# Patient Record
Sex: MALE | Race: WHITE | NOT HISPANIC OR LATINO | Employment: OTHER | URBAN - METROPOLITAN AREA
[De-identification: names, ages, dates, MRNs, and addresses within clinical notes are randomized per-mention and may not be internally consistent; named-entity substitution may affect disease eponyms.]

---

## 2023-01-26 PROBLEM — I87.2 VENOUS INSUFFICIENCY: Status: ACTIVE | Noted: 2023-01-26

## 2023-01-26 PROBLEM — R73.9 HYPERGLYCEMIA: Status: ACTIVE | Noted: 2023-01-26

## 2023-01-26 PROBLEM — I25.10 ATHEROSCLEROTIC HEART DISEASE OF NATIVE CORONARY ARTERY WITHOUT ANGINA PECTORIS: Status: ACTIVE | Noted: 2023-01-26

## 2023-01-26 PROBLEM — R00.1 BRADYCARDIA: Status: ACTIVE | Noted: 2023-01-26

## 2023-01-26 PROBLEM — M48.00 SPINAL STENOSIS: Status: ACTIVE | Noted: 2023-01-26

## 2023-01-26 PROBLEM — E11.9 DIABETES MELLITUS (MULTI): Status: ACTIVE | Noted: 2023-01-26

## 2023-01-26 PROBLEM — M54.16 LUMBAR RADICULOPATHY: Status: ACTIVE | Noted: 2023-01-26

## 2023-01-26 PROBLEM — R82.90 ABNORMAL URINALYSIS: Status: ACTIVE | Noted: 2023-01-26

## 2023-01-26 PROBLEM — R05.9 COUGH: Status: ACTIVE | Noted: 2023-01-26

## 2023-01-26 PROBLEM — H61.23 BILATERAL IMPACTED CERUMEN: Status: ACTIVE | Noted: 2023-01-26

## 2023-01-26 PROBLEM — E87.1 HYPONATREMIA: Status: ACTIVE | Noted: 2023-01-26

## 2023-01-26 PROBLEM — Z86.0100 HISTORY OF COLON POLYPS: Status: ACTIVE | Noted: 2023-01-26

## 2023-01-26 PROBLEM — E66.9 OBESITY, CLASS I, BMI 30.0-34.9 (SEE ACTUAL BMI): Status: ACTIVE | Noted: 2023-01-26

## 2023-01-26 PROBLEM — M47.816 SPONDYLOSIS WITHOUT MYELOPATHY OR RADICULOPATHY, LUMBAR REGION: Status: ACTIVE | Noted: 2023-01-26

## 2023-01-26 PROBLEM — Z79.01 CHRONIC ANTICOAGULATION: Status: ACTIVE | Noted: 2023-01-26

## 2023-01-26 PROBLEM — G56.00 CARPAL TUNNEL SYNDROME: Status: ACTIVE | Noted: 2023-01-26

## 2023-01-26 PROBLEM — H61.20 CERUMINOSIS: Status: ACTIVE | Noted: 2023-01-26

## 2023-01-26 PROBLEM — D64.9 ANEMIA: Status: ACTIVE | Noted: 2023-01-26

## 2023-01-26 PROBLEM — E66.811 OBESITY, CLASS I, BMI 30.0-34.9 (SEE ACTUAL BMI): Status: ACTIVE | Noted: 2023-01-26

## 2023-01-26 PROBLEM — Z86.79: Status: ACTIVE | Noted: 2023-01-26

## 2023-01-26 PROBLEM — K21.9 ESOPHAGEAL REFLUX: Status: ACTIVE | Noted: 2023-01-26

## 2023-01-26 PROBLEM — R06.09 DYSPNEA ON EXERTION: Status: ACTIVE | Noted: 2023-01-26

## 2023-01-26 PROBLEM — J90 PLEURAL EFFUSION: Status: ACTIVE | Noted: 2023-01-26

## 2023-01-26 PROBLEM — N40.0 PROSTATIC HYPERPLASIA: Status: ACTIVE | Noted: 2023-01-26

## 2023-01-26 PROBLEM — N47.1 PHIMOSIS: Status: ACTIVE | Noted: 2023-01-26

## 2023-01-26 PROBLEM — Z86.010 HISTORY OF COLON POLYPS: Status: ACTIVE | Noted: 2023-01-26

## 2023-01-26 PROBLEM — N48.1 BALANITIS: Status: ACTIVE | Noted: 2023-01-26

## 2023-01-26 PROBLEM — J30.9 ALLERGIC RHINITIS: Status: ACTIVE | Noted: 2023-01-26

## 2023-01-26 PROBLEM — R09.82 POST-NASAL DRIP: Status: ACTIVE | Noted: 2023-01-26

## 2023-01-26 PROBLEM — M23.90 INTERNAL DERANGEMENT OF KNEE: Status: ACTIVE | Noted: 2023-01-26

## 2023-01-26 PROBLEM — H90.5 SNHL (SENSORINEURAL HEARING LOSS): Status: ACTIVE | Noted: 2023-01-26

## 2023-01-26 PROBLEM — R97.20 INCREASING PROSTATE SPECIFIC ANTIGEN LEVEL: Status: ACTIVE | Noted: 2023-01-26

## 2023-01-26 PROBLEM — R93.89 ABNORMAL CHEST XRAY: Status: ACTIVE | Noted: 2023-01-26

## 2023-01-26 PROBLEM — H91.90 HEARING LOSS: Status: ACTIVE | Noted: 2023-01-26

## 2023-01-26 PROBLEM — M75.80 ROTATOR CUFF TENDONITIS: Status: ACTIVE | Noted: 2023-01-26

## 2023-01-26 PROBLEM — I48.91 ATRIAL FIBRILLATION (MULTI): Status: ACTIVE | Noted: 2023-01-26

## 2023-01-26 PROBLEM — N40.1 BPH WITH OBSTRUCTION/LOWER URINARY TRACT SYMPTOMS: Status: ACTIVE | Noted: 2023-01-26

## 2023-01-26 PROBLEM — H90.3 BILATERAL SENSORINEURAL HEARING LOSS: Status: ACTIVE | Noted: 2023-01-26

## 2023-01-26 PROBLEM — N13.8 BPH WITH OBSTRUCTION/LOWER URINARY TRACT SYMPTOMS: Status: ACTIVE | Noted: 2023-01-26

## 2023-01-26 PROBLEM — I10 BENIGN ESSENTIAL HYPERTENSION: Status: ACTIVE | Noted: 2023-01-26

## 2023-01-26 PROBLEM — I25.10 3-VESSEL CORONARY ARTERY DISEASE: Status: ACTIVE | Noted: 2023-01-26

## 2023-01-26 PROBLEM — E78.5 HYPERLIPIDEMIA: Status: ACTIVE | Noted: 2023-01-26

## 2023-01-26 RX ORDER — ATORVASTATIN CALCIUM 80 MG/1
1 TABLET, FILM COATED ORAL DAILY
COMMUNITY
Start: 2014-01-08 | End: 2023-11-20 | Stop reason: SDUPTHER

## 2023-01-26 RX ORDER — METFORMIN HYDROCHLORIDE 500 MG/1
1 TABLET, EXTENDED RELEASE ORAL DAILY
COMMUNITY
Start: 2018-06-25 | End: 2023-08-09

## 2023-01-26 RX ORDER — PANTOPRAZOLE SODIUM 40 MG/1
1 TABLET, DELAYED RELEASE ORAL DAILY
COMMUNITY
End: 2023-07-05

## 2023-01-26 RX ORDER — POTASSIUM CHLORIDE 20 MEQ/1
1 TABLET, EXTENDED RELEASE ORAL DAILY
COMMUNITY
Start: 2013-06-22 | End: 2023-04-28

## 2023-01-26 RX ORDER — METOPROLOL TARTRATE 25 MG/1
1 TABLET, FILM COATED ORAL 2 TIMES DAILY
COMMUNITY
Start: 2014-07-30 | End: 2023-09-29 | Stop reason: SDUPTHER

## 2023-01-26 RX ORDER — FUROSEMIDE 20 MG/1
2 TABLET ORAL DAILY
COMMUNITY
Start: 2021-08-30 | End: 2023-10-03 | Stop reason: SDUPTHER

## 2023-01-26 RX ORDER — NITROGLYCERIN 0.4 MG/1
1 TABLET SUBLINGUAL EVERY 5 MIN PRN
COMMUNITY
Start: 2014-10-29

## 2023-01-26 RX ORDER — WARFARIN SODIUM 5 MG/1
1 TABLET ORAL
COMMUNITY
Start: 2016-07-11 | End: 2023-08-22

## 2023-01-26 RX ORDER — ACETAMINOPHEN 500 MG
1 TABLET ORAL EVERY 8 HOURS PRN
COMMUNITY
Start: 2016-10-13

## 2023-01-26 RX ORDER — LORATADINE 10 MG/1
1 CAPSULE, LIQUID FILLED ORAL DAILY PRN
COMMUNITY
Start: 2016-10-13

## 2023-01-26 RX ORDER — DILTIAZEM HYDROCHLORIDE 360 MG/1
1 CAPSULE, EXTENDED RELEASE ORAL NIGHTLY
COMMUNITY
Start: 2017-05-25 | End: 2023-07-27

## 2023-01-26 RX ORDER — DOXAZOSIN 4 MG/1
1.5 TABLET ORAL DAILY
COMMUNITY
Start: 2014-10-28 | End: 2023-06-06

## 2023-01-26 RX ORDER — RAMIPRIL 5 MG/1
1 CAPSULE ORAL DAILY
COMMUNITY
Start: 2014-09-03 | End: 2023-05-31 | Stop reason: SDUPTHER

## 2023-01-26 RX ORDER — ACETAMINOPHEN 500 MG
1 TABLET ORAL DAILY
COMMUNITY
Start: 2020-02-12

## 2023-01-26 RX ORDER — CYCLOBENZAPRINE HCL 5 MG
1 TABLET ORAL NIGHTLY PRN
COMMUNITY
Start: 2020-10-28

## 2023-02-21 LAB
INR IN PPP BY COAGULATION ASSAY: 2.9 (ref 0.9–1.1)
PROTHROMBIN TIME (PT) IN PPP BY COAGULATION ASSAY: 33.7 SEC (ref 9.8–13.4)

## 2023-03-10 ENCOUNTER — OFFICE VISIT (OUTPATIENT)
Dept: PRIMARY CARE | Facility: CLINIC | Age: 88
End: 2023-03-10
Payer: MEDICARE

## 2023-03-10 VITALS
WEIGHT: 188 LBS | SYSTOLIC BLOOD PRESSURE: 110 MMHG | DIASTOLIC BLOOD PRESSURE: 66 MMHG | OXYGEN SATURATION: 91 % | RESPIRATION RATE: 18 BRPM | BODY MASS INDEX: 33.3 KG/M2 | HEART RATE: 58 BPM

## 2023-03-10 DIAGNOSIS — Z79.01 CHRONIC ANTICOAGULATION: ICD-10-CM

## 2023-03-10 DIAGNOSIS — R06.09 DYSPNEA ON EXERTION: ICD-10-CM

## 2023-03-10 DIAGNOSIS — I10 BENIGN ESSENTIAL HYPERTENSION: Primary | ICD-10-CM

## 2023-03-10 DIAGNOSIS — E11.9 TYPE 2 DIABETES MELLITUS WITHOUT COMPLICATION, WITHOUT LONG-TERM CURRENT USE OF INSULIN (MULTI): ICD-10-CM

## 2023-03-10 DIAGNOSIS — I48.11 LONGSTANDING PERSISTENT ATRIAL FIBRILLATION (MULTI): ICD-10-CM

## 2023-03-10 PROCEDURE — 3078F DIAST BP <80 MM HG: CPT | Performed by: SPECIALIST

## 2023-03-10 PROCEDURE — 1159F MED LIST DOCD IN RCRD: CPT | Performed by: SPECIALIST

## 2023-03-10 PROCEDURE — 3074F SYST BP LT 130 MM HG: CPT | Performed by: SPECIALIST

## 2023-03-10 PROCEDURE — 99214 OFFICE O/P EST MOD 30 MIN: CPT | Performed by: SPECIALIST

## 2023-03-10 RX ORDER — ASPIRIN 81 MG/1
81 TABLET ORAL DAILY
COMMUNITY

## 2023-03-10 NOTE — PROGRESS NOTES
Subjective   Patient ID: Ariel Rodríguez is a 88 y.o. male who presents for Follow-up.  HPI    Joints bothering with cold weather.  Back bothers him walking.  Discussed concerns for Cymbalta due to bleeding risk given warfarin and ASA.  Discussed Pain Management, declined for now.  Discussed topical lidoderm patches  Always short of breath now walking  No orthopnea    Review of Systems  Constitutional  No fatigue, no fevers, no chills, no unintentional weight loss,   HEENT:  No headaches, no dizziness, no vision changes, has hearing aids for hearing loss  Cardiovascular:  No chest pain, no palpitations, Positive shortness of breath with exertion (to mailbox)    Respiratory:  Occasional cough, no hemoptysis, no wheezing, No shortness of breath at rest  GI:  No reflux, no abdominal pain, no nausea, no vomiting, no changes in bowel habits, always constipation, no bright red blood per rectum, no melena  :  No urinary frequency, no dysuria, no urine incontinence  MSK:  No falls,positive back legs joint pain, no joint swelling  Neuro:  No tremors, no extremity weakness, no changes in sensation    Objective   Physical Exam  General: Well-appearing  M in no acute distress, well nourished, well hydrated  Head:  Normocephalic, atraumatic  Eyes:  Anicteric sclera, pupils equal  Oral:     Mask in place (not examined due to pandemic)  Neck:   Supple,  Cor:      Regular rate, normal S1, S2, no murmurs appreciated, no S3, no S4   Lungs:   Clear to auscultation b/l, no wheezes, no rhonchi, no crackles, no accessory respiratory muscle use  Abd:                 Soft non tender no guarding  Ext:            One plus bilateral lower extremity edema (not wearing compression hose today for exam)    Assessment/Plan   Problem List Items Addressed This Visit          Respiratory    Dyspnea on exertion     On Furosemide 20 mg twice daily,  no real improvement.  Gets dyspnea walking to mailbox.  Plans follow-up with Pulmonary soon.                Circulatory    Atrial fibrillation (CMS/[facility])     Management per Cardiology, on warfarin         Relevant Orders    CBC    Protime-INR    Benign essential hypertension - Primary     Well controlled continue current medication         Relevant Orders    Comprehensive Metabolic Panel    CBC       Endocrine/Metabolic    Diabetes mellitus (CMS/[facility])     Very well controlled, 11/9/22 A1C 6.4 on XR Metformin 500 mg daily.  Sees optho next week.         Relevant Orders    Comprehensive Metabolic Panel    Albumin , Urine Random       Other    Chronic anticoagulation     On warfarin, monthly INRs, put in new standing order               Trice June, DO

## 2023-03-10 NOTE — ASSESSMENT & PLAN NOTE
On Furosemide 20 mg twice daily,  no real improvement.  Gets dyspnea walking to mailbox.  Plans follow-up with Pulmonary soon.

## 2023-03-21 ENCOUNTER — LAB (OUTPATIENT)
Dept: LAB | Facility: LAB | Age: 88
End: 2023-03-21
Payer: MEDICARE

## 2023-03-21 DIAGNOSIS — I48.11 LONGSTANDING PERSISTENT ATRIAL FIBRILLATION (MULTI): ICD-10-CM

## 2023-03-21 DIAGNOSIS — E11.9 TYPE 2 DIABETES MELLITUS WITHOUT COMPLICATION, WITHOUT LONG-TERM CURRENT USE OF INSULIN (MULTI): ICD-10-CM

## 2023-03-21 DIAGNOSIS — I10 BENIGN ESSENTIAL HYPERTENSION: ICD-10-CM

## 2023-03-21 LAB
ALANINE AMINOTRANSFERASE (SGPT) (U/L) IN SER/PLAS: 14 U/L (ref 10–52)
ALBUMIN (G/DL) IN SER/PLAS: 3.8 G/DL (ref 3.4–5)
ALKALINE PHOSPHATASE (U/L) IN SER/PLAS: 72 U/L (ref 33–136)
ANION GAP IN SER/PLAS: 13 MMOL/L (ref 10–20)
ASPARTATE AMINOTRANSFERASE (SGOT) (U/L) IN SER/PLAS: 18 U/L (ref 9–39)
BILIRUBIN TOTAL (MG/DL) IN SER/PLAS: 0.7 MG/DL (ref 0–1.2)
CALCIUM (MG/DL) IN SER/PLAS: 8.9 MG/DL (ref 8.6–10.6)
CARBON DIOXIDE, TOTAL (MMOL/L) IN SER/PLAS: 27 MMOL/L (ref 21–32)
CHLORIDE (MMOL/L) IN SER/PLAS: 103 MMOL/L (ref 98–107)
CREATININE (MG/DL) IN SER/PLAS: 0.9 MG/DL (ref 0.5–1.3)
ERYTHROCYTE DISTRIBUTION WIDTH (RATIO) BY AUTOMATED COUNT: 15.3 % (ref 11.5–14.5)
ERYTHROCYTE MEAN CORPUSCULAR HEMOGLOBIN CONCENTRATION (G/DL) BY AUTOMATED: 30.9 G/DL (ref 32–36)
ERYTHROCYTE MEAN CORPUSCULAR VOLUME (FL) BY AUTOMATED COUNT: 94 FL (ref 80–100)
ERYTHROCYTES (10*6/UL) IN BLOOD BY AUTOMATED COUNT: 4.04 X10E12/L (ref 4.5–5.9)
GFR MALE: 82 ML/MIN/1.73M2
GLUCOSE (MG/DL) IN SER/PLAS: 214 MG/DL (ref 74–99)
HEMATOCRIT (%) IN BLOOD BY AUTOMATED COUNT: 37.9 % (ref 41–52)
HEMOGLOBIN (G/DL) IN BLOOD: 11.7 G/DL (ref 13.5–17.5)
INR IN PPP BY COAGULATION ASSAY: 2.8 (ref 0.9–1.1)
LEUKOCYTES (10*3/UL) IN BLOOD BY AUTOMATED COUNT: 6.1 X10E9/L (ref 4.4–11.3)
NRBC (PER 100 WBCS) BY AUTOMATED COUNT: 0 /100 WBC (ref 0–0)
PLATELETS (10*3/UL) IN BLOOD AUTOMATED COUNT: 156 X10E9/L (ref 150–450)
POTASSIUM (MMOL/L) IN SER/PLAS: 4.3 MMOL/L (ref 3.5–5.3)
PROTEIN TOTAL: 6.6 G/DL (ref 6.4–8.2)
PROTHROMBIN TIME (PT) IN PPP BY COAGULATION ASSAY: 32.9 SEC (ref 9.8–13.4)
SODIUM (MMOL/L) IN SER/PLAS: 139 MMOL/L (ref 136–145)
UREA NITROGEN (MG/DL) IN SER/PLAS: 24 MG/DL (ref 6–23)

## 2023-03-21 PROCEDURE — 80053 COMPREHEN METABOLIC PANEL: CPT

## 2023-03-21 PROCEDURE — 82570 ASSAY OF URINE CREATININE: CPT

## 2023-03-21 PROCEDURE — 85610 PROTHROMBIN TIME: CPT

## 2023-03-21 PROCEDURE — 85027 COMPLETE CBC AUTOMATED: CPT

## 2023-03-21 PROCEDURE — 82043 UR ALBUMIN QUANTITATIVE: CPT

## 2023-03-21 PROCEDURE — 36415 COLL VENOUS BLD VENIPUNCTURE: CPT

## 2023-03-22 LAB
ALBUMIN (MG/L) IN URINE: 19.6 MG/L
ALBUMIN/CREATININE (UG/MG) IN URINE: 47.5 UG/MG CRT (ref 0–30)
CREATININE (MG/DL) IN URINE: 41.3 MG/DL (ref 20–370)

## 2023-04-19 LAB
INR IN PPP BY COAGULATION ASSAY: 2.4 (ref 0.9–1.1)
PROTHROMBIN TIME (PT) IN PPP BY COAGULATION ASSAY: 28.5 SEC (ref 9.8–13.4)

## 2023-04-28 DIAGNOSIS — I10 BENIGN ESSENTIAL HYPERTENSION: Primary | ICD-10-CM

## 2023-04-28 RX ORDER — POTASSIUM CHLORIDE 20 MEQ/1
TABLET, EXTENDED RELEASE ORAL
Qty: 90 TABLET | Refills: 0 | Status: SHIPPED | OUTPATIENT
Start: 2023-04-28 | End: 2023-07-27

## 2023-05-17 DIAGNOSIS — Z79.01 CHRONIC ANTICOAGULATION: ICD-10-CM

## 2023-05-17 LAB
INR IN PPP BY COAGULATION ASSAY: 2.1 (ref 0.9–1.1)
PROTHROMBIN TIME (PT) IN PPP BY COAGULATION ASSAY: 24.5 SEC (ref 9.8–13.4)

## 2023-05-30 NOTE — PROGRESS NOTES
"Subjective   Patient ID: Ariel Rodríguez is a 88 y.o. male who presents for Follow-up.  HPI    89 yo male Pmhx sig for Afib (On warfarin), CAD, Rheumatic Fever, DM, Hyperlipidemia, BPH, Colon Polyp and Spinal Stenosis presents for follow-up accompanied by his wife, Amy    Needs new standing order for PT/INR for every 2 weeks but typically only needs to do once per month     Is \"so so\"    Doesn't do mychart  Got 6 to 8 phone calls reminding him of appointments (Sunday 5/28 ) started early in May  Has not needed SL nitro  Saw dentist and saw allergist    Having constipation takes colace normal bm 1 every 3 days but more often with prune juice  Discussed adding Miralax    Had Influenza vaccine  Had 2 Covid 2 boosters bivlaent     No Known Allergies       Current Outpatient Medications   Medication Instructions    acetaminophen (Tylenol) 500 mg tablet 1 tablet, oral, Every 8 hours PRN, Repeat every 4 to 6 hours as needed    aspirin 81 mg, oral, Daily    atorvastatin (Lipitor) 80 mg tablet 1 tablet, oral, Daily    cholecalciferol (Vitamin D-3) 50 mcg (2,000 unit) capsule 1 capsule, oral, Daily    cyclobenzaprine (Flexeril) 5 mg tablet 1 tablet, oral, Nightly PRN    dilTIAZem ER (Tiazac) 360 mg 24 hr capsule 1 capsule, oral, Nightly    doxazosin (Cardura) 4 mg tablet 1.5 tablets, oral, Daily    furosemide (Lasix) 20 mg tablet 2 tablets, oral, Daily    glucosamine/chondr nelson A sod (OSTEO BI-FLEX ORAL) 2 tablets, oral, Daily, Joint Sheild    loratadine (Claritin Liqui-Gel) 10 mg capsule 1 capsule, oral, Daily PRN    metFORMIN XR (Glucophage-XR) 500 mg 24 hr tablet 1 tablet, oral, Daily    metoprolol tartrate (Lopressor) 25 mg tablet 1 tablet, oral, 2 times daily    multivitamin/iron/folic acid (CENTRUM ORAL) 1 tablet, oral, Daily, Centrum Adult Oral Tablet    nitroglycerin (Nitrostat) 0.4 mg SL tablet 1 tablet, sublingual, Every 5 min PRN    pantoprazole (Protonix) 40 mg EC tablet 1 tablet, oral, Daily, Take 30 minutes " "before breakfast    polyethylene glycol (GLYCOLAX) 9 g, oral, Daily, Mix 1/2 cap (8.5g) into 4 ounces of fluid.    potassium chloride CR (Klor-Con M20) 20 mEq ER tablet TAKE 1 TABLET BY MOUTH EVERY DAY    ramipril (ALTACE) 5 mg, oral, Daily    warfarin (Coumadin) 5 mg tablet 1 tablet, oral        Review of Systems  Constitutional  Always a little fatigue, no fevers, no chills, no unintentional weight loss,   HEENT:  No headaches, no dizziness, eye exams current, hearing aids, getting new test in August   Cardiovascular:  No chest pain, no palpitations, Positive shortness of breath with exertion (one flight of stairs), unchanged   Respiratory:   Coughs a little every day in morning, no hemoptysis, no wheezing, No shortness of breath at rest  GI:  No reflux, no abdominal pain, no nausea, no vomiting, positive constipation no brbpr no melena  :  No urinary frequency, no dysuria, no urine incontinence  MSK:  No falls, positive low back pain, some times shoulder strain in chest if picks up something too heavy, no joint swelling just ankle swelling unchanged  Neuro:  No tremors, no extremity weakness, some tingling on right hand prior CTS left CTS not surgically repaired     Physical Exam  /68   Pulse 59   Resp 17   Ht 1.6 m (5' 3\")   Wt 82.1 kg (181 lb)   SpO2 96%   BMI 32.06 kg/m²   General: Well-appearing  M in no acute distress, well nourished, well hydrated  Head:  Normocephalic, atraumatic  Eyes:  Anicteric sclera, pupils equal  Oral:     Not examined due to pandemic  Neck:   Supple  Cor:      Irregularly irregular, 2/6 systolic murmur   Lungs:   Clear to auscultation b/l, no wheezes, no rhonchi, no crackles, no accessory respiratory muscle use  Ext:            One plus lower extremity edema, no palpable cords, not wearing compression hose today  Neuro:   CN2-12 grossly intact   MSK:                No tenderness to percussion over the spinous processes    Assessment/Plan   Problem List Items Addressed " This Visit          Nervous    Lumbar radiculopathy     Asked about brace, discussed PT, he'll consider  Discussed Pain Management for injection, not interested at this time            Circulatory    3-vessel coronary artery disease     Management per cardiology         Atrial fibrillation (CMS/[facility])     Needs new standing order for PT/INR         Benign essential hypertension     Bp well controlled on current medication   Refilled ramipril         Relevant Medications    ramipril (Altace) 5 mg capsule    Other Relevant Orders    Comprehensive Metabolic Panel (Completed)       Digestive    Other constipation    Relevant Medications    polyethylene glycol (Glycolax) 17 gram packet       Endocrine/Metabolic    Diabetes mellitus (CMS/[facility]) - Primary     Last HBA1C 6.4 in November 2022  Normal kidney function   XR metformin 500 mg daily  Diabetic eye exams          Relevant Orders    Comprehensive Metabolic Panel (Completed)    Hemoglobin A1C (Completed)    Albumin , Urine Random (Completed)       Other    Chronic anticoagulation     For Afib  Typically does monthly INR, needs new standing order  Adding Miralax so will check INR in 2 weeks                 Trice June,

## 2023-05-31 ENCOUNTER — OFFICE VISIT (OUTPATIENT)
Dept: PRIMARY CARE | Facility: CLINIC | Age: 88
End: 2023-05-31
Payer: MEDICARE

## 2023-05-31 VITALS
BODY MASS INDEX: 32.07 KG/M2 | HEIGHT: 63 IN | SYSTOLIC BLOOD PRESSURE: 103 MMHG | RESPIRATION RATE: 17 BRPM | HEART RATE: 59 BPM | OXYGEN SATURATION: 96 % | WEIGHT: 181 LBS | DIASTOLIC BLOOD PRESSURE: 68 MMHG

## 2023-05-31 DIAGNOSIS — M54.16 LUMBAR RADICULOPATHY: ICD-10-CM

## 2023-05-31 DIAGNOSIS — K59.09 OTHER CONSTIPATION: ICD-10-CM

## 2023-05-31 DIAGNOSIS — I48.11 LONGSTANDING PERSISTENT ATRIAL FIBRILLATION (MULTI): ICD-10-CM

## 2023-05-31 DIAGNOSIS — I48.91 ATRIAL FIBRILLATION, UNSPECIFIED TYPE (MULTI): ICD-10-CM

## 2023-05-31 DIAGNOSIS — I10 BENIGN ESSENTIAL HYPERTENSION: ICD-10-CM

## 2023-05-31 DIAGNOSIS — Z79.01 CHRONIC ANTICOAGULATION: ICD-10-CM

## 2023-05-31 DIAGNOSIS — I25.10 3-VESSEL CORONARY ARTERY DISEASE: ICD-10-CM

## 2023-05-31 DIAGNOSIS — E11.9 TYPE 2 DIABETES MELLITUS WITHOUT COMPLICATION, WITHOUT LONG-TERM CURRENT USE OF INSULIN (MULTI): Primary | ICD-10-CM

## 2023-05-31 LAB
ALANINE AMINOTRANSFERASE (SGPT) (U/L) IN SER/PLAS: 14 U/L (ref 10–52)
ALBUMIN (G/DL) IN SER/PLAS: 4 G/DL (ref 3.4–5)
ALBUMIN (MG/L) IN URINE: 8.8 MG/L
ALBUMIN/CREATININE (UG/MG) IN URINE: 23.7 UG/MG CRT (ref 0–30)
ALKALINE PHOSPHATASE (U/L) IN SER/PLAS: 88 U/L (ref 33–136)
ANION GAP IN SER/PLAS: 10 MMOL/L (ref 10–20)
ASPARTATE AMINOTRANSFERASE (SGOT) (U/L) IN SER/PLAS: 19 U/L (ref 9–39)
BILIRUBIN TOTAL (MG/DL) IN SER/PLAS: 0.8 MG/DL (ref 0–1.2)
CALCIUM (MG/DL) IN SER/PLAS: 9.2 MG/DL (ref 8.6–10.6)
CARBON DIOXIDE, TOTAL (MMOL/L) IN SER/PLAS: 28 MMOL/L (ref 21–32)
CHLORIDE (MMOL/L) IN SER/PLAS: 104 MMOL/L (ref 98–107)
CREATININE (MG/DL) IN SER/PLAS: 1 MG/DL (ref 0.5–1.3)
CREATININE (MG/DL) IN URINE: 37.1 MG/DL (ref 20–370)
ESTIMATED AVERAGE GLUCOSE FOR HBA1C: 151 MG/DL
GFR MALE: 72 ML/MIN/1.73M2
GLUCOSE (MG/DL) IN SER/PLAS: 166 MG/DL (ref 74–99)
HEMOGLOBIN A1C/HEMOGLOBIN TOTAL IN BLOOD: 6.9 %
INR IN PPP BY COAGULATION ASSAY: 2.2 (ref 0.9–1.1)
POTASSIUM (MMOL/L) IN SER/PLAS: 4.2 MMOL/L (ref 3.5–5.3)
PROTEIN TOTAL: 7.1 G/DL (ref 6.4–8.2)
PROTHROMBIN TIME (PT) IN PPP BY COAGULATION ASSAY: 25.4 SEC (ref 9.8–13.4)
SODIUM (MMOL/L) IN SER/PLAS: 138 MMOL/L (ref 136–145)
UREA NITROGEN (MG/DL) IN SER/PLAS: 23 MG/DL (ref 6–23)

## 2023-05-31 PROCEDURE — 83036 HEMOGLOBIN GLYCOSYLATED A1C: CPT

## 2023-05-31 PROCEDURE — 85610 PROTHROMBIN TIME: CPT

## 2023-05-31 PROCEDURE — 82570 ASSAY OF URINE CREATININE: CPT

## 2023-05-31 PROCEDURE — 80053 COMPREHEN METABOLIC PANEL: CPT

## 2023-05-31 PROCEDURE — 3078F DIAST BP <80 MM HG: CPT | Performed by: SPECIALIST

## 2023-05-31 PROCEDURE — 99214 OFFICE O/P EST MOD 30 MIN: CPT | Performed by: SPECIALIST

## 2023-05-31 PROCEDURE — 82043 UR ALBUMIN QUANTITATIVE: CPT

## 2023-05-31 PROCEDURE — 3074F SYST BP LT 130 MM HG: CPT | Performed by: SPECIALIST

## 2023-05-31 PROCEDURE — 1159F MED LIST DOCD IN RCRD: CPT | Performed by: SPECIALIST

## 2023-05-31 PROCEDURE — 1036F TOBACCO NON-USER: CPT | Performed by: SPECIALIST

## 2023-05-31 RX ORDER — POLYETHYLENE GLYCOL 3350 17 G/17G
8.5 POWDER, FOR SOLUTION ORAL DAILY
Qty: 30 PACKET | Refills: 1 | Status: SHIPPED | OUTPATIENT
Start: 2023-05-31 | End: 2023-06-30

## 2023-05-31 RX ORDER — RAMIPRIL 5 MG/1
5 CAPSULE ORAL DAILY
Qty: 90 CAPSULE | Refills: 3 | Status: SHIPPED | OUTPATIENT
Start: 2023-05-31 | End: 2024-05-22

## 2023-05-31 NOTE — ASSESSMENT & PLAN NOTE
Last HBA1C 6.4 in November 2022  Normal kidney function   XR metformin 500 mg daily  Diabetic eye exams

## 2023-05-31 NOTE — ASSESSMENT & PLAN NOTE
Asked about brace, discussed PT, he'll consider  Discussed Pain Management for injection, not interested at this time

## 2023-06-01 DIAGNOSIS — Z79.01 CHRONIC ANTICOAGULATION: Primary | ICD-10-CM

## 2023-06-01 DIAGNOSIS — I48.91 ATRIAL FIBRILLATION, UNSPECIFIED TYPE (MULTI): ICD-10-CM

## 2023-06-01 NOTE — ASSESSMENT & PLAN NOTE
For Afib  Typically does monthly INR, needs new standing order  Adding Miralax so will check INR in 2 weeks

## 2023-06-05 DIAGNOSIS — I10 BENIGN ESSENTIAL HYPERTENSION: ICD-10-CM

## 2023-06-06 RX ORDER — DOXAZOSIN 4 MG/1
6 TABLET ORAL DAILY
Qty: 135 TABLET | Refills: 1 | Status: SHIPPED | OUTPATIENT
Start: 2023-06-06 | End: 2023-12-05

## 2023-06-16 ENCOUNTER — LAB (OUTPATIENT)
Dept: LAB | Facility: LAB | Age: 88
End: 2023-06-16
Payer: MEDICARE

## 2023-06-16 DIAGNOSIS — Z79.01 CHRONIC ANTICOAGULATION: ICD-10-CM

## 2023-06-16 DIAGNOSIS — I48.91 ATRIAL FIBRILLATION, UNSPECIFIED TYPE (MULTI): ICD-10-CM

## 2023-06-16 LAB
INR IN PPP BY COAGULATION ASSAY: 2.7 (ref 0.9–1.1)
PROTHROMBIN TIME (PT) IN PPP BY COAGULATION ASSAY: 32 SEC (ref 9.8–13.4)

## 2023-06-16 PROCEDURE — 85610 PROTHROMBIN TIME: CPT

## 2023-06-16 PROCEDURE — 36415 COLL VENOUS BLD VENIPUNCTURE: CPT

## 2023-07-03 DIAGNOSIS — K21.9 GASTROESOPHAGEAL REFLUX DISEASE WITHOUT ESOPHAGITIS: Primary | ICD-10-CM

## 2023-07-05 RX ORDER — PANTOPRAZOLE SODIUM 40 MG/1
TABLET, DELAYED RELEASE ORAL
Qty: 90 TABLET | Refills: 3 | Status: SHIPPED | OUTPATIENT
Start: 2023-07-05

## 2023-07-17 LAB
INR IN PPP BY COAGULATION ASSAY: 2.8 (ref 0.9–1.1)
PROTHROMBIN TIME (PT) IN PPP BY COAGULATION ASSAY: 32.1 SEC (ref 9.8–12.8)

## 2023-07-24 DIAGNOSIS — I10 BENIGN ESSENTIAL HYPERTENSION: ICD-10-CM

## 2023-07-27 ENCOUNTER — TELEPHONE (OUTPATIENT)
Dept: PRIMARY CARE | Facility: CLINIC | Age: 88
End: 2023-07-27
Payer: MEDICARE

## 2023-07-27 RX ORDER — POTASSIUM CHLORIDE 20 MEQ/1
TABLET, EXTENDED RELEASE ORAL
Qty: 90 TABLET | Refills: 1 | Status: SHIPPED | OUTPATIENT
Start: 2023-07-27 | End: 2024-01-21

## 2023-07-27 RX ORDER — DILTIAZEM HYDROCHLORIDE 360 MG/1
360 CAPSULE, EXTENDED RELEASE ORAL NIGHTLY
Qty: 90 CAPSULE | Refills: 3 | Status: SHIPPED | OUTPATIENT
Start: 2023-07-27

## 2023-08-07 DIAGNOSIS — E11.9 TYPE 2 DIABETES MELLITUS WITHOUT COMPLICATION, WITHOUT LONG-TERM CURRENT USE OF INSULIN (MULTI): ICD-10-CM

## 2023-08-09 RX ORDER — METFORMIN HYDROCHLORIDE 500 MG/1
500 TABLET, EXTENDED RELEASE ORAL DAILY
Qty: 90 TABLET | Refills: 0 | Status: SHIPPED | OUTPATIENT
Start: 2023-08-09 | End: 2023-10-31

## 2023-08-16 ENCOUNTER — LAB (OUTPATIENT)
Dept: LAB | Facility: LAB | Age: 88
End: 2023-08-16
Payer: MEDICARE

## 2023-08-16 DIAGNOSIS — I48.91 ATRIAL FIBRILLATION, UNSPECIFIED TYPE (MULTI): ICD-10-CM

## 2023-08-16 DIAGNOSIS — Z79.01 CHRONIC ANTICOAGULATION: ICD-10-CM

## 2023-08-16 PROCEDURE — 36415 COLL VENOUS BLD VENIPUNCTURE: CPT

## 2023-08-16 PROCEDURE — 85610 PROTHROMBIN TIME: CPT

## 2023-08-19 DIAGNOSIS — I48.91 ATRIAL FIBRILLATION, UNSPECIFIED TYPE (MULTI): ICD-10-CM

## 2023-08-22 RX ORDER — WARFARIN SODIUM 5 MG/1
TABLET ORAL
Qty: 90 TABLET | Refills: 1 | Status: SHIPPED | OUTPATIENT
Start: 2023-08-22 | End: 2024-04-30

## 2023-09-19 ENCOUNTER — LAB (OUTPATIENT)
Dept: LAB | Facility: LAB | Age: 88
End: 2023-09-19
Payer: MEDICARE

## 2023-09-19 DIAGNOSIS — Z79.01 CHRONIC ANTICOAGULATION: ICD-10-CM

## 2023-09-19 DIAGNOSIS — I48.91 ATRIAL FIBRILLATION, UNSPECIFIED TYPE (MULTI): ICD-10-CM

## 2023-09-19 LAB
INR IN PPP BY COAGULATION ASSAY: 2.8 (ref 0.9–1.1)
PROTHROMBIN TIME (PT) IN PPP BY COAGULATION ASSAY: 31.9 SEC (ref 9.8–12.8)

## 2023-09-19 PROCEDURE — 85610 PROTHROMBIN TIME: CPT

## 2023-09-19 PROCEDURE — 36415 COLL VENOUS BLD VENIPUNCTURE: CPT

## 2023-09-29 ENCOUNTER — TELEPHONE (OUTPATIENT)
Dept: PRIMARY CARE | Facility: CLINIC | Age: 88
End: 2023-09-29

## 2023-09-29 ENCOUNTER — OFFICE VISIT (OUTPATIENT)
Dept: PRIMARY CARE | Facility: CLINIC | Age: 88
End: 2023-09-29
Payer: MEDICARE

## 2023-09-29 VITALS
BODY MASS INDEX: 30.22 KG/M2 | HEART RATE: 67 BPM | RESPIRATION RATE: 17 BRPM | DIASTOLIC BLOOD PRESSURE: 59 MMHG | WEIGHT: 170.6 LBS | OXYGEN SATURATION: 94 % | SYSTOLIC BLOOD PRESSURE: 109 MMHG

## 2023-09-29 DIAGNOSIS — Z00.00 HEALTH CARE MAINTENANCE: ICD-10-CM

## 2023-09-29 DIAGNOSIS — I10 BENIGN ESSENTIAL HYPERTENSION: Primary | ICD-10-CM

## 2023-09-29 DIAGNOSIS — E11.9 TYPE 2 DIABETES MELLITUS WITHOUT COMPLICATION, WITHOUT LONG-TERM CURRENT USE OF INSULIN (MULTI): ICD-10-CM

## 2023-09-29 DIAGNOSIS — Z79.01 CHRONIC ANTICOAGULATION: ICD-10-CM

## 2023-09-29 DIAGNOSIS — I25.10 3-VESSEL CORONARY ARTERY DISEASE: ICD-10-CM

## 2023-09-29 DIAGNOSIS — I48.91 ATRIAL FIBRILLATION, UNSPECIFIED TYPE (MULTI): ICD-10-CM

## 2023-09-29 DIAGNOSIS — D64.9 ANEMIA, UNSPECIFIED TYPE: ICD-10-CM

## 2023-09-29 DIAGNOSIS — R06.09 DYSPNEA ON EXERTION: ICD-10-CM

## 2023-09-29 PROCEDURE — 1126F AMNT PAIN NOTED NONE PRSNT: CPT | Performed by: SPECIALIST

## 2023-09-29 PROCEDURE — 1036F TOBACCO NON-USER: CPT | Performed by: SPECIALIST

## 2023-09-29 PROCEDURE — 99214 OFFICE O/P EST MOD 30 MIN: CPT | Performed by: SPECIALIST

## 2023-09-29 PROCEDURE — 3074F SYST BP LT 130 MM HG: CPT | Performed by: SPECIALIST

## 2023-09-29 PROCEDURE — 3078F DIAST BP <80 MM HG: CPT | Performed by: SPECIALIST

## 2023-09-29 PROCEDURE — 1159F MED LIST DOCD IN RCRD: CPT | Performed by: SPECIALIST

## 2023-09-29 RX ORDER — ISOSORBIDE MONONITRATE 30 MG/1
TABLET, EXTENDED RELEASE ORAL
COMMUNITY
Start: 2014-08-08 | End: 2023-09-29 | Stop reason: ALTCHOICE

## 2023-09-29 RX ORDER — GABAPENTIN 300 MG/1
300 CAPSULE ORAL 2 TIMES DAILY
COMMUNITY
End: 2023-09-29 | Stop reason: ALTCHOICE

## 2023-09-29 RX ORDER — METOPROLOL TARTRATE 25 MG/1
25 TABLET, FILM COATED ORAL 2 TIMES DAILY
COMMUNITY
End: 2024-03-26

## 2023-09-29 RX ORDER — OMEPRAZOLE 20 MG/1
20 TABLET, DELAYED RELEASE ORAL DAILY
COMMUNITY
End: 2023-09-29 | Stop reason: ALTCHOICE

## 2023-09-29 NOTE — PROGRESS NOTES
Subjective   Patient ID: Ariel Rodríguez is a 88 y.o. male who presents for Follow-up.  HPI    89 yo male Pmhx sig for Afib (on Warfarin), CAD, Rheumatic Fever, DM, Hyperlipidemia, BPH, Colon Polyps and Spinal stenosis here for f/up    Said if difficulty breathing, cardiology said occasionally could take furosemide in evening, but he has only needed to do once    Had a couple of bad weeks  Mother in law  age 96    Has some sputum suggested mucinex    No Known Allergies   Current Outpatient Medications   Medication Instructions    acetaminophen (Tylenol) 500 mg tablet 1 tablet, oral, Every 8 hours PRN, Repeat every 4 to 6 hours as needed    aspirin 81 mg, oral, Daily    atorvastatin (Lipitor) 80 mg tablet 1 tablet, oral, Daily    cholecalciferol (Vitamin D-3) 50 mcg (2,000 unit) capsule 1 capsule, oral, Daily    cyclobenzaprine (Flexeril) 5 mg tablet 1 tablet, oral, Nightly PRN    dilTIAZem ER (TIAZAC) 360 mg, oral, Nightly    doxazosin (CARDURA) 6 mg, oral, Daily    furosemide (LASIX) 40 mg, oral, Daily    glucosamine/chondr nelson A sod (OSTEO BI-FLEX ORAL) 2 tablets, oral, Daily, Joint Sheild    loratadine (Claritin Liqui-Gel) 10 mg capsule 1 capsule, oral, Daily PRN    metFORMIN XR (GLUCOPHAGE-XR) 500 mg, oral, Daily    metoprolol tartrate (LOPRESSOR) 25 mg, oral, 2 times daily    multivitamin/iron/folic acid (CENTRUM ORAL) 1 tablet, oral, Daily, Centrum Adult Oral Tablet    nitroglycerin (Nitrostat) 0.4 mg SL tablet 1 tablet, sublingual, Every 5 min PRN    pantoprazole (ProtoNix) 40 mg EC tablet TAKE 1 TABLET BY MOUTH EVERY MORNING 30 MINUTES BEFORE BREAKFAST    potassium chloride CR (Klor-Con M20) 20 mEq ER tablet TAKE 1 TABLET BY MOUTH EVERY DAY    ramipril (ALTACE) 5 mg, oral, Daily    warfarin (Coumadin) 5 mg tablet Take 1 tablet by mouth daily or as directed        Review of Systems  Constitutional  A little fatigue, no fevers, no chills, no unintentional weight loss (cut out eating after 6 and decreased  portion size)  HEENT:  Occasional headaches, no dizziness, eye exams every summer, no double vision, no blurred vision, uses hearing loss  Cardiovascular:  No chest pain, no palpitations, a little shortness of breath with exertion (one flight of stairs),   Respiratory:  No coughing, no wheezing, No shortness of breath at rest  GI:  No dysphagia, no odynophagia, no reflux, no abdominal pain, no nausea, no vomiting, no changes in bowel habits, no bright red blood per rectum, no melena  :  No urinary frequency, no dysuria, no urine incontinence  MSK:  No falls, no joint pain,  Neuro:  No tremors, no extremity weakness, just from CTS changes in sensation    Physical Exam  /59   Pulse 67   Resp 17   Wt 77.4 kg (170 lb 9.6 oz)   SpO2 94%   BMI 30.22 kg/m²   General: Well-appearing  M in no acute distress, well nourished, well hydrated  Head:  Normocephalic, atraumatic  Eyes:  Anicteric sclera, pupils equal  Oral:     Not examined due to pandemic  Neck:   Supple  Cor:      Regular rate, normal S1, S2, no murmurs appreciated, no S3, no S4   Lungs:   Clear to auscultation b/l, no wheezes, no rhonchi, no crackles, no accessory respiratory muscle use  Ext:                  One plus bilateral lower extremity edema, no palpable cords  Neuro:   CN2-12 grossly intact (except decreased hearing with hearing aids in place)    Assessment/Plan   Problem List Items Addressed This Visit       3-vessel coronary artery disease    Relevant Medications    metoprolol tartrate (Lopressor) 25 mg tablet    Other Relevant Orders    Comprehensive Metabolic Panel    CBC and Auto Differential    Atrial fibrillation (CMS/HCC)    Relevant Medications    metoprolol tartrate (Lopressor) 25 mg tablet    Other Relevant Orders    CBC and Auto Differential    Benign essential hypertension - Primary     BP well controlled on current medication  Labs ordered CMP         Relevant Orders    Comprehensive Metabolic Panel    CBC and Auto Differential     Diabetes mellitus (CMS/HCC)     Controlled, Last HBA1C 6.9 5/31/23, Urine Albumin 5/31/23  Continue metformin  mg daily  Labs ordered HBA1C CMP         Relevant Orders    Hemoglobin A1C    Comprehensive Metabolic Panel    Chronic anticoagulation     INR 2.8 on 9/19/23  Continue current dose warfarin               Trice June DO

## 2023-09-29 NOTE — TELEPHONE ENCOUNTER
Patient was confused looking at his AVS. He wants to know if you took him off metoprolol tartrate ?

## 2023-10-03 ENCOUNTER — TELEPHONE (OUTPATIENT)
Dept: PRIMARY CARE | Facility: CLINIC | Age: 88
End: 2023-10-03
Payer: MEDICARE

## 2023-10-03 DIAGNOSIS — R06.09 DYSPNEA ON EXERTION: Primary | ICD-10-CM

## 2023-10-03 PROBLEM — Z00.00 HEALTH CARE MAINTENANCE: Status: ACTIVE | Noted: 2023-10-03

## 2023-10-03 RX ORDER — FUROSEMIDE 20 MG/1
40 TABLET ORAL DAILY
Qty: 180 TABLET | Refills: 1 | Status: SHIPPED | OUTPATIENT
Start: 2023-10-03

## 2023-10-17 ENCOUNTER — LAB (OUTPATIENT)
Dept: LAB | Facility: LAB | Age: 88
End: 2023-10-17
Payer: MEDICARE

## 2023-10-17 DIAGNOSIS — I48.91 ATRIAL FIBRILLATION, UNSPECIFIED TYPE (MULTI): ICD-10-CM

## 2023-10-17 DIAGNOSIS — E11.9 TYPE 2 DIABETES MELLITUS WITHOUT COMPLICATION, WITHOUT LONG-TERM CURRENT USE OF INSULIN (MULTI): ICD-10-CM

## 2023-10-17 DIAGNOSIS — Z79.01 CHRONIC ANTICOAGULATION: ICD-10-CM

## 2023-10-17 DIAGNOSIS — I25.10 3-VESSEL CORONARY ARTERY DISEASE: ICD-10-CM

## 2023-10-17 DIAGNOSIS — I10 BENIGN ESSENTIAL HYPERTENSION: ICD-10-CM

## 2023-10-17 LAB
ALBUMIN SERPL BCP-MCNC: 3.8 G/DL (ref 3.4–5)
ALP SERPL-CCNC: 77 U/L (ref 33–136)
ALT SERPL W P-5'-P-CCNC: 14 U/L (ref 10–52)
ANION GAP SERPL CALC-SCNC: 14 MMOL/L (ref 10–20)
AST SERPL W P-5'-P-CCNC: 17 U/L (ref 9–39)
BASOPHILS # BLD AUTO: 0.03 X10*3/UL (ref 0–0.1)
BASOPHILS NFR BLD AUTO: 0.5 %
BILIRUB SERPL-MCNC: 0.9 MG/DL (ref 0–1.2)
BUN SERPL-MCNC: 21 MG/DL (ref 6–23)
CALCIUM SERPL-MCNC: 9.1 MG/DL (ref 8.6–10.6)
CHLORIDE SERPL-SCNC: 103 MMOL/L (ref 98–107)
CO2 SERPL-SCNC: 27 MMOL/L (ref 21–32)
CREAT SERPL-MCNC: 0.94 MG/DL (ref 0.5–1.3)
EOSINOPHIL # BLD AUTO: 0.34 X10*3/UL (ref 0–0.4)
EOSINOPHIL NFR BLD AUTO: 6 %
ERYTHROCYTE [DISTWIDTH] IN BLOOD BY AUTOMATED COUNT: 15.4 % (ref 11.5–14.5)
EST. AVERAGE GLUCOSE BLD GHB EST-MCNC: 148 MG/DL
GFR SERPL CREATININE-BSD FRML MDRD: 78 ML/MIN/1.73M*2
GLUCOSE SERPL-MCNC: 217 MG/DL (ref 74–99)
HBA1C MFR BLD: 6.8 %
HCT VFR BLD AUTO: 37.3 % (ref 41–52)
HGB BLD-MCNC: 11.5 G/DL (ref 13.5–17.5)
IMM GRANULOCYTES # BLD AUTO: 0.02 X10*3/UL (ref 0–0.5)
IMM GRANULOCYTES NFR BLD AUTO: 0.4 % (ref 0–0.9)
INR PPP: 2.7 (ref 0.9–1.1)
LYMPHOCYTES # BLD AUTO: 0.95 X10*3/UL (ref 0.8–3)
LYMPHOCYTES NFR BLD AUTO: 16.8 %
MCH RBC QN AUTO: 29.9 PG (ref 26–34)
MCHC RBC AUTO-ENTMCNC: 30.8 G/DL (ref 32–36)
MCV RBC AUTO: 97 FL (ref 80–100)
MONOCYTES # BLD AUTO: 0.72 X10*3/UL (ref 0.05–0.8)
MONOCYTES NFR BLD AUTO: 12.8 %
NEUTROPHILS # BLD AUTO: 3.58 X10*3/UL (ref 1.6–5.5)
NEUTROPHILS NFR BLD AUTO: 63.5 %
NRBC BLD-RTO: 0 /100 WBCS (ref 0–0)
PLATELET # BLD AUTO: 161 X10*3/UL (ref 150–450)
PMV BLD AUTO: 11.3 FL (ref 7.5–11.5)
POTASSIUM SERPL-SCNC: 4.2 MMOL/L (ref 3.5–5.3)
PROT SERPL-MCNC: 6.9 G/DL (ref 6.4–8.2)
PROTHROMBIN TIME: 30.6 SECONDS (ref 9.8–12.8)
RBC # BLD AUTO: 3.84 X10*6/UL (ref 4.5–5.9)
SODIUM SERPL-SCNC: 140 MMOL/L (ref 136–145)
WBC # BLD AUTO: 5.6 X10*3/UL (ref 4.4–11.3)

## 2023-10-17 PROCEDURE — 83036 HEMOGLOBIN GLYCOSYLATED A1C: CPT

## 2023-10-17 PROCEDURE — 36415 COLL VENOUS BLD VENIPUNCTURE: CPT

## 2023-10-17 PROCEDURE — 85025 COMPLETE CBC W/AUTO DIFF WBC: CPT

## 2023-10-17 PROCEDURE — 85610 PROTHROMBIN TIME: CPT

## 2023-10-17 PROCEDURE — 80053 COMPREHEN METABOLIC PANEL: CPT

## 2023-10-30 DIAGNOSIS — E11.9 TYPE 2 DIABETES MELLITUS WITHOUT COMPLICATION, WITHOUT LONG-TERM CURRENT USE OF INSULIN (MULTI): ICD-10-CM

## 2023-10-31 RX ORDER — METFORMIN HYDROCHLORIDE 500 MG/1
500 TABLET, EXTENDED RELEASE ORAL DAILY
Qty: 90 TABLET | Refills: 0 | Status: SHIPPED | OUTPATIENT
Start: 2023-10-31 | End: 2024-01-31

## 2023-11-16 ENCOUNTER — LAB (OUTPATIENT)
Dept: LAB | Facility: LAB | Age: 88
End: 2023-11-16
Payer: MEDICARE

## 2023-11-16 DIAGNOSIS — Z79.01 CHRONIC ANTICOAGULATION: ICD-10-CM

## 2023-11-16 DIAGNOSIS — I48.91 ATRIAL FIBRILLATION, UNSPECIFIED TYPE (MULTI): ICD-10-CM

## 2023-11-16 LAB
INR PPP: 3.2 (ref 0.9–1.1)
PROTHROMBIN TIME: 37.1 SECONDS (ref 9.8–12.8)

## 2023-11-16 PROCEDURE — 36415 COLL VENOUS BLD VENIPUNCTURE: CPT

## 2023-11-16 PROCEDURE — 85610 PROTHROMBIN TIME: CPT

## 2023-11-20 ENCOUNTER — OFFICE VISIT (OUTPATIENT)
Dept: PRIMARY CARE | Facility: CLINIC | Age: 88
End: 2023-11-20
Payer: MEDICARE

## 2023-11-20 VITALS
BODY MASS INDEX: 32.74 KG/M2 | HEIGHT: 63 IN | SYSTOLIC BLOOD PRESSURE: 124 MMHG | HEART RATE: 72 BPM | RESPIRATION RATE: 16 BRPM | WEIGHT: 184.8 LBS | DIASTOLIC BLOOD PRESSURE: 62 MMHG | OXYGEN SATURATION: 95 %

## 2023-11-20 DIAGNOSIS — E11.9 TYPE 2 DIABETES MELLITUS WITHOUT COMPLICATION, WITHOUT LONG-TERM CURRENT USE OF INSULIN (MULTI): ICD-10-CM

## 2023-11-20 DIAGNOSIS — I48.91 ATRIAL FIBRILLATION, UNSPECIFIED TYPE (MULTI): ICD-10-CM

## 2023-11-20 DIAGNOSIS — I10 BENIGN ESSENTIAL HYPERTENSION: ICD-10-CM

## 2023-11-20 DIAGNOSIS — Z00.00 MEDICARE ANNUAL WELLNESS VISIT, SUBSEQUENT: Primary | ICD-10-CM

## 2023-11-20 DIAGNOSIS — I25.10 3-VESSEL CORONARY ARTERY DISEASE: ICD-10-CM

## 2023-11-20 DIAGNOSIS — Z79.01 CHRONIC ANTICOAGULATION: ICD-10-CM

## 2023-11-20 DIAGNOSIS — E66.9 OBESITY, CLASS I, BMI 30.0-34.9 (SEE ACTUAL BMI): ICD-10-CM

## 2023-11-20 DIAGNOSIS — E78.5 HYPERLIPIDEMIA, UNSPECIFIED HYPERLIPIDEMIA TYPE: ICD-10-CM

## 2023-11-20 PROCEDURE — G0439 PPPS, SUBSEQ VISIT: HCPCS | Performed by: SPECIALIST

## 2023-11-20 PROCEDURE — 3078F DIAST BP <80 MM HG: CPT | Performed by: SPECIALIST

## 2023-11-20 PROCEDURE — 99397 PER PM REEVAL EST PAT 65+ YR: CPT | Performed by: SPECIALIST

## 2023-11-20 PROCEDURE — 1126F AMNT PAIN NOTED NONE PRSNT: CPT | Performed by: SPECIALIST

## 2023-11-20 PROCEDURE — 1160F RVW MEDS BY RX/DR IN RCRD: CPT | Performed by: SPECIALIST

## 2023-11-20 PROCEDURE — 3074F SYST BP LT 130 MM HG: CPT | Performed by: SPECIALIST

## 2023-11-20 PROCEDURE — 1170F FXNL STATUS ASSESSED: CPT | Performed by: SPECIALIST

## 2023-11-20 PROCEDURE — 1159F MED LIST DOCD IN RCRD: CPT | Performed by: SPECIALIST

## 2023-11-20 PROCEDURE — 1036F TOBACCO NON-USER: CPT | Performed by: SPECIALIST

## 2023-11-20 RX ORDER — ATORVASTATIN CALCIUM 80 MG/1
80 TABLET, FILM COATED ORAL DAILY
Qty: 90 TABLET | Refills: 3 | Status: SHIPPED | OUTPATIENT
Start: 2023-11-20

## 2023-11-20 ASSESSMENT — ACTIVITIES OF DAILY LIVING (ADL)
GROCERY_SHOPPING: INDEPENDENT
DRESSING: INDEPENDENT
DOING_HOUSEWORK: INDEPENDENT
MANAGING_FINANCES: INDEPENDENT
BATHING: INDEPENDENT
TAKING_MEDICATION: INDEPENDENT

## 2023-11-20 ASSESSMENT — PATIENT HEALTH QUESTIONNAIRE - PHQ9
1. LITTLE INTEREST OR PLEASURE IN DOING THINGS: NOT AT ALL
SUM OF ALL RESPONSES TO PHQ9 QUESTIONS 1 AND 2: 0
2. FEELING DOWN, DEPRESSED OR HOPELESS: NOT AT ALL

## 2023-11-20 NOTE — PROGRESS NOTES
Subjective   Patient ID: Ariel Rodríguez is a 89 y.o. male who presents for No chief complaint on file..  HPI    89 yo male Pmhx sig for Afib (on Warfarin), CAD, Rheumatic Fever, DM, Hyperlipidemia, BPH, Colon Polyps and Spinal stenosis here for MAW accompanied by his wife    Had fall last week, between table and recliner, turned and recliner broke fall didn't hit head, not injured  Bought an exercise machine but wasn't new but sending it back to get new one    No Known Allergies   Current Outpatient Medications   Medication Instructions    acetaminophen (Tylenol) 500 mg tablet 1 tablet, oral, Every 8 hours PRN, Repeat every 4 to 6 hours as needed    aspirin 81 mg, oral, Daily    atorvastatin (LIPITOR) 80 mg, oral, Daily    cholecalciferol (Vitamin D-3) 50 mcg (2,000 unit) capsule 1 capsule, oral, Daily    cyclobenzaprine (Flexeril) 5 mg tablet 1 tablet, oral, Nightly PRN    dilTIAZem ER (TIAZAC) 360 mg, oral, Nightly    doxazosin (CARDURA) 6 mg, oral, Daily    furosemide (LASIX) 40 mg, oral, Daily    glucosamine/chondr nelson A sod (OSTEO BI-FLEX ORAL) 2 tablets, oral, Daily, Joint Sheild    loratadine (Claritin Liqui-Gel) 10 mg capsule 1 capsule, oral, Daily PRN    metFORMIN XR (GLUCOPHAGE-XR) 500 mg, oral, Daily    metoprolol tartrate (LOPRESSOR) 25 mg, oral, 2 times daily    multivitamin/iron/folic acid (CENTRUM ORAL) 1 tablet, oral, Daily, Centrum Adult Oral Tablet    nitroglycerin (Nitrostat) 0.4 mg SL tablet 1 tablet, sublingual, Every 5 min PRN    pantoprazole (ProtoNix) 40 mg EC tablet TAKE 1 TABLET BY MOUTH EVERY MORNING 30 MINUTES BEFORE BREAKFAST    potassium chloride CR (Klor-Con M20) 20 mEq ER tablet TAKE 1 TABLET BY MOUTH EVERY DAY    ramipril (ALTACE) 5 mg, oral, Daily    warfarin (Coumadin) 5 mg tablet Take 1 tablet by mouth daily or as directed        Review of Systems  Constitutional  Always lifelong fatigue, no fevers, no chills, no unintentional weight loss,   HEENT:  Slight morning headache 2 x  "week usually relieved with hot shower, no dizziness, no double vision, no blurred vision, using hearing aids for hearing loss, hearing check in Feb, eye exam in March  Cardiovascular:  No chest pain, Occasional palpitations (rapid HR if moves quickly relieved with rest), Positive shortness of breath with exertion (one flight of stairs),   Respiratory:  No cough, occasional wheezing, occasional shortness of breath at rest  GI:  No dysphagia, no odynophagia, no reflux, no abdominal pain, no nausea, no vomiting, no changes in bowel habits, no bright red blood per rectum, no melena  :  No urinary frequency, no dysuria, no urine incontinence, nocturia x 1  MSK:  One falls, neck pain occasionally, low back pain, left shoulder pain, ankle joint swelling (hx of rheumatic fever and right ankle sprain from baseball when younger)  Neuro:  No tremors, no extremity weakness, b/l carpal tunnel left greater than right    Physical Exam  /62   Pulse 72   Resp 16   Ht 1.6 m (5' 3\")   Wt 83.8 kg (184 lb 12.8 oz)   SpO2 95%   BMI 32.74 kg/m²   General: Well-appearing  M in no acute distress, well nourished, well hydrated  Head:  Normocephalic, atraumatic  Eyes:  Anicteric sclera, pupils equal  Ears:       TMs intact  Oral:     Not examined due to pandemic)  Neck:   Supple, no cervical/supraclavicular adenopathy, no thyromegaly or nodules appreciated on exam  Cor:      Regular rate, normal S1, S2, no murmurs appreciated, no S3, no S4   Lungs:   Clear to auscultation b/l, no wheezes, no rhonchi, no crackles, no accessory respiratory muscle use  Abd:          Soft, nontender, no guarding, no rebound, no hepatosplenomegaly appreciated   Ext:            Positive edema b/l lower extremity compression hose in place, no palpable cords  Declined Diabetic foot exam not performed due to compression hose,  Neuro:   CN2-12 grossly intact    Assessment/Plan   Problem List Items Addressed This Visit       3-vessel coronary artery " disease     Management per cardiology  LDL 33 last year  On Atorvastatin 80 mg daily  Labs ordered           Relevant Medications    atorvastatin (Lipitor) 80 mg tablet    Other Relevant Orders    Lipid Panel    Atrial fibrillation (CMS/[facility])     Management per cardiology  Chronic Anticoagulation with warfarin  Takes one-half tablet Sun Weds Fri, whole tablet all other days  INR was 3.2 on 11/16/2023, has not been eating much salad  Will do one-half tablet tonight since INR was 3.2   Is now back to eating salad (had salad yesterday)  For repeat INR in 2 weeks         Benign essential hypertension     Well controlled on current medication         Relevant Orders    CBC and Auto Differential    Comprehensive Metabolic Panel    Diabetes mellitus (CMS/[facility])     HBA1C 6.8 on metformin 500 mg daily, well-controlled  Discussed podiatry for diabetic foot care  Continue annual diabetic eye exams         Relevant Orders    CBC and Auto Differential    Hemoglobin A1C    Albumin , Urine Random    Comprehensive Metabolic Panel    Hyperlipidemia     LDL 33 last year  On Atorvastatin 80 mg daily  Labs ordered         Relevant Orders    Lipid Panel    Comprehensive Metabolic Panel    Obesity, Class I, BMI 30.0-34.9 (see actual BMI)     Continue weight loss efforts         Chronic anticoagulation     Chronic Anticoagulation with warfarin  Takes one-half tablet Sun Weds Fri, whole tablet all other days  INR was 3.2 on 11/16/2023, has not been eating much salad  Will do one-half tablet tonight since INR was 3.2   Is now back to eating salad (had salad yesterday)  For repeat INR in 2-3 weeks         Medicare annual wellness visit, subsequent - Primary     Previously received annual influenza vaccine  Previously received RSV vaccine 10/17/2023  Previously received Covid vaccines 3/8/2021 4/6/2021 10/6/2021 5/10/22 9/12/2022 and 11/6/2023  Previously received PCV 13 on 7/14/2015  Previously received PPSV23 on 6/25/2018  Recommend  Tdap  Recommend Shingrix vaccines  Prior negative screen for Hepatitis C 2021  Labs ordered CMP Fx Lipids CBC HBA1C for January 18 2024          For Follow-up diabetes in February        Trice June DO

## 2023-11-30 PROBLEM — Z00.00 MEDICARE ANNUAL WELLNESS VISIT, SUBSEQUENT: Status: ACTIVE | Noted: 2023-11-30

## 2023-11-30 PROBLEM — E87.1 HYPONATREMIA: Status: RESOLVED | Noted: 2023-01-26 | Resolved: 2023-11-30

## 2023-11-30 PROBLEM — R73.9 HYPERGLYCEMIA: Status: RESOLVED | Noted: 2023-01-26 | Resolved: 2023-11-30

## 2023-11-30 NOTE — ASSESSMENT & PLAN NOTE
Previously received annual influenza vaccine  Previously received RSV vaccine 10/17/2023  Previously received Covid vaccines 3/8/2021 4/6/2021 10/6/2021 5/10/22 9/12/2022 and 11/6/2023  Previously received PCV 13 on 7/14/2015  Previously received PPSV23 on 6/25/2018  Recommend Tdap  Recommend Shingrix vaccines  Prior negative screen for Hepatitis C 2021  Labs ordered CMP Fx Lipids CBC HBA1C for January 18 2024

## 2023-11-30 NOTE — ASSESSMENT & PLAN NOTE
Management per cardiology  Chronic Anticoagulation with warfarin  Takes one-half tablet Sun Weds Fri, whole tablet all other days  INR was 3.2 on 11/16/2023, has not been eating much salad  Will do one-half tablet tonight since INR was 3.2   Is now back to eating salad (had salad yesterday)  For repeat INR in 2 weeks

## 2023-12-01 NOTE — ASSESSMENT & PLAN NOTE
Chronic Anticoagulation with warfarin  Takes one-half tablet Sun Weds Fri, whole tablet all other days  INR was 3.2 on 11/16/2023, has not been eating much salad  Will do one-half tablet tonight since INR was 3.2   Is now back to eating salad (had salad yesterday)  For repeat INR in 2-3 weeks

## 2023-12-01 NOTE — ASSESSMENT & PLAN NOTE
HBA1C 6.8 on metformin 500 mg daily, well-controlled  Discussed podiatry for diabetic foot care  Continue annual diabetic eye exams

## 2023-12-03 DIAGNOSIS — I10 BENIGN ESSENTIAL HYPERTENSION: ICD-10-CM

## 2023-12-05 RX ORDER — DOXAZOSIN 4 MG/1
6 TABLET ORAL DAILY
Qty: 135 TABLET | Refills: 0 | Status: SHIPPED | OUTPATIENT
Start: 2023-12-05 | End: 2024-02-26 | Stop reason: SDUPTHER

## 2023-12-20 ENCOUNTER — LAB (OUTPATIENT)
Dept: LAB | Facility: LAB | Age: 88
End: 2023-12-20
Payer: MEDICARE

## 2023-12-20 DIAGNOSIS — I48.91 ATRIAL FIBRILLATION, UNSPECIFIED TYPE (MULTI): ICD-10-CM

## 2023-12-20 DIAGNOSIS — Z79.01 CHRONIC ANTICOAGULATION: ICD-10-CM

## 2023-12-20 LAB
INR PPP: 2.9 (ref 0.9–1.1)
PROTHROMBIN TIME: 32.9 SECONDS (ref 9.8–12.8)

## 2023-12-20 PROCEDURE — 85610 PROTHROMBIN TIME: CPT

## 2023-12-20 PROCEDURE — 36415 COLL VENOUS BLD VENIPUNCTURE: CPT

## 2023-12-26 ENCOUNTER — ANCILLARY PROCEDURE (OUTPATIENT)
Dept: RADIOLOGY | Facility: CLINIC | Age: 88
End: 2023-12-26
Payer: MEDICARE

## 2023-12-26 ENCOUNTER — OFFICE VISIT (OUTPATIENT)
Dept: PULMONOLOGY | Facility: CLINIC | Age: 88
End: 2023-12-26
Payer: MEDICARE

## 2023-12-26 VITALS
OXYGEN SATURATION: 98 % | WEIGHT: 182 LBS | SYSTOLIC BLOOD PRESSURE: 119 MMHG | BODY MASS INDEX: 32.25 KG/M2 | HEART RATE: 67 BPM | DIASTOLIC BLOOD PRESSURE: 70 MMHG | TEMPERATURE: 98 F | HEIGHT: 63 IN

## 2023-12-26 DIAGNOSIS — R06.09 DYSPNEA ON EXERTION: Primary | ICD-10-CM

## 2023-12-26 DIAGNOSIS — R06.09 DYSPNEA ON EXERTION: ICD-10-CM

## 2023-12-26 PROCEDURE — 99214 OFFICE O/P EST MOD 30 MIN: CPT | Performed by: INTERNAL MEDICINE

## 2023-12-26 PROCEDURE — 3074F SYST BP LT 130 MM HG: CPT | Performed by: INTERNAL MEDICINE

## 2023-12-26 PROCEDURE — 3078F DIAST BP <80 MM HG: CPT | Performed by: INTERNAL MEDICINE

## 2023-12-26 PROCEDURE — 71046 X-RAY EXAM CHEST 2 VIEWS: CPT | Performed by: RADIOLOGY

## 2023-12-26 PROCEDURE — 1036F TOBACCO NON-USER: CPT | Performed by: INTERNAL MEDICINE

## 2023-12-26 PROCEDURE — 1159F MED LIST DOCD IN RCRD: CPT | Performed by: INTERNAL MEDICINE

## 2023-12-26 PROCEDURE — 1126F AMNT PAIN NOTED NONE PRSNT: CPT | Performed by: INTERNAL MEDICINE

## 2023-12-26 PROCEDURE — 71046 X-RAY EXAM CHEST 2 VIEWS: CPT

## 2023-12-26 PROCEDURE — 1160F RVW MEDS BY RX/DR IN RCRD: CPT | Performed by: INTERNAL MEDICINE

## 2023-12-26 ASSESSMENT — ENCOUNTER SYMPTOMS
EYE DISCHARGE: 0
NUMBNESS: 0
SHORTNESS OF BREATH: 1
SINUS PAIN: 0
DIZZINESS: 0
NERVOUS/ANXIOUS: 0
AGITATION: 0
JOINT SWELLING: 0
SLEEP DISTURBANCE: 0
FREQUENCY: 0
ABDOMINAL PAIN: 0
HEMATURIA: 0
TREMORS: 0
CHOKING: 0
NAUSEA: 0
APNEA: 0
EYE REDNESS: 0
RHINORRHEA: 0
COUGH: 0
FEVER: 0
DIFFICULTY URINATING: 0
BRUISES/BLEEDS EASILY: 0
DYSURIA: 0
ARTHRALGIAS: 0
LIGHT-HEADEDNESS: 0
FACIAL SWELLING: 0
ABDOMINAL DISTENTION: 0
WHEEZING: 0
FATIGUE: 0
PALPITATIONS: 0
HEADACHES: 0
ADENOPATHY: 0
SINUS PRESSURE: 0
UNEXPECTED WEIGHT CHANGE: 0
CONSTIPATION: 0
STRIDOR: 0
WEAKNESS: 0
SPEECH DIFFICULTY: 0

## 2023-12-26 NOTE — PROGRESS NOTES
Pulmonary follow-up visit        Reason: effusion    ASSESSMENT:   The patient is an 89-year-old with atrial fibrillation, elevation of right hemidiaphragm and right pleural fluid accumulation.  His chest x-ray today outlines to my inspection increasing pleural effusion accumulation which likely is responsible for his increasing dyspnea.  He does have 2+ edema.  I suspect that he has a component of fluid overload from his heart failure.  For completeness I am going to obtain a CT scan of the chest to better define chest structures.  He probably needs increasing diuresis.  He is seeing cardiology later in the week.    PLAN:   I am going to obtain a follow-up CT scan of the chest to determine the extent of the pleural effusion versus paralyzed diaphragm for the changes on the right side of the chest.  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 There is no history of trauma, degenerative arthritis of the neck or other specific causes for this condition. In addition, he was determined to have at increasing right pleural effusion accumulation on the right with 1+ edema on examination. He has had edema for extended timeframe and he does have atrial fibrillation with underlying CAD. I wondered whether he could have a component of heart failure accounting for these findings. The effusion was not marked and probably the most reasonable thing was to empirically treat him with an increased diuretic dose and then see if his effusion resolves. The echocardiogram from September 16, 2021 revealed left ventricular ejection fraction of 55 to 60% with moderately increased left ventricular septal thickness and a severely dilated  left atrium. The right ventricle was mildly reduced in function but normal in size. He was seen in follow-up on October 6, 2021 and was doing well from a pulmonary perspective on his diuretics. There was no increasing pleural effusion with elevation of the right hemidiaphragm and he was stable from a pulmonary perspective. It was felt that potentially his cough was potentiated by Ramipril or reflux. He appears compensated from a pulm perspective and the treatment was to continue as is. When last seen on June 6, 2022 he appeared stable from a pulmonary perspective. We will do chest x-ray showing chronic elevation right hemidiaphragm with blunting of the right costophrenic angle.        The patient was last seen on June 19, 2023 and at that time it was described that the patient over the last several months he has noted some increasing shortness of breath. He saw Dr. Ruggiero last week his diuretic dose was increased to 2 a day. He feels that that made a difference. He has no chest pains or pressures. He has some chronic swelling but it has not changed too much. He is not wheezing or bringing up any phlegm.  The chest x-ray was stable at that point in time.      Currently the patient reports that over the last 2 to 3 months he has had increasing shortness of breath particular when he bends over.  He has no chest pains or pressures PND orthopnea.  He has chronic swelling of his legs and wears support hose and is on 40 mg of furosemide a day.  He notes that after he starts doing something he feels little bit better and he takes slow deep breaths and feels less short of breath.            No Known Allergies       Current Outpatient Medications:     acetaminophen (Tylenol) 500 mg tablet, Take 1 tablet (500 mg) by mouth every 8 hours if needed for moderate pain (4 - 6). Repeat every 4 to 6 hours as needed, Disp: , Rfl:     aspirin 81 mg EC tablet, Take 1 tablet (81 mg) by mouth once daily., Disp: , Rfl:     atorvastatin  (Lipitor) 80 mg tablet, Take 1 tablet (80 mg) by mouth once daily., Disp: 90 tablet, Rfl: 3    cholecalciferol (Vitamin D-3) 50 mcg (2,000 unit) capsule, Take 1 capsule (50 mcg) by mouth once daily., Disp: , Rfl:     cyclobenzaprine (Flexeril) 5 mg tablet, Take 1 tablet (5 mg) by mouth as needed at bedtime for muscle spasms (or Back Pain)., Disp: , Rfl:     dilTIAZem ER (Tiazac) 360 mg 24 hr capsule, TAKE 1 CAPSULE BY MOUTH EVERY NIGHT AT BEDTIME, Disp: 90 capsule, Rfl: 3    doxazosin (Cardura) 4 mg tablet, Take 1.5 tablets (6 mg) by mouth once daily., Disp: 135 tablet, Rfl: 0    furosemide (Lasix) 20 mg tablet, Take 2 tablets (40 mg) by mouth once daily., Disp: 180 tablet, Rfl: 1    glucosamine/chondr nelson A sod (OSTEO BI-FLEX ORAL), Take 2 tablets by mouth 1 (one) time each day. Joint Esdras, Disp: , Rfl:     loratadine (Claritin Liqui-Gel) 10 mg capsule, Take 1 capsule by mouth if needed each day., Disp: , Rfl:     metFORMIN  mg 24 hr tablet, Take 1 tablet (500 mg) by mouth once daily., Disp: 90 tablet, Rfl: 0    metoprolol tartrate (Lopressor) 25 mg tablet, Take 1 tablet (25 mg) by mouth 2 times a day., Disp: , Rfl:     multivitamin/iron/folic acid (CENTRUM ORAL), Take 1 tablet by mouth 1 (one) time each day. Centrum Adult Oral Tablet, Disp: , Rfl:     nitroglycerin (Nitrostat) 0.4 mg SL tablet, Place 1 tablet (0.4 mg) under the tongue every 5 minutes if needed for chest pain., Disp: , Rfl:     pantoprazole (ProtoNix) 40 mg EC tablet, TAKE 1 TABLET BY MOUTH EVERY MORNING 30 MINUTES BEFORE BREAKFAST, Disp: 90 tablet, Rfl: 3    potassium chloride CR (Klor-Con M20) 20 mEq ER tablet, TAKE 1 TABLET BY MOUTH EVERY DAY, Disp: 90 tablet, Rfl: 1    ramipril (Altace) 5 mg capsule, Take 1 capsule (5 mg) by mouth once daily., Disp: 90 capsule, Rfl: 3    warfarin (Coumadin) 5 mg tablet, Take 1 tablet by mouth daily or as directed, Disp: 90 tablet, Rfl: 1       Review of Systems   Constitutional:  Negative for fatigue,  fever and unexpected weight change.   HENT:  Negative for congestion, facial swelling, nosebleeds, postnasal drip, rhinorrhea, sinus pressure and sinus pain.    Eyes:  Negative for discharge, redness and visual disturbance.   Respiratory:  Positive for shortness of breath. Negative for apnea, cough, choking, wheezing and stridor.    Cardiovascular:  Positive for leg swelling. Negative for chest pain and palpitations.   Gastrointestinal:  Negative for abdominal distention, abdominal pain, constipation and nausea.   Endocrine: Negative for cold intolerance and heat intolerance.   Genitourinary:  Negative for difficulty urinating, dysuria, frequency and hematuria.   Musculoskeletal:  Negative for arthralgias, gait problem and joint swelling.   Allergic/Immunologic: Negative for environmental allergies, food allergies and immunocompromised state.   Neurological:  Negative for dizziness, tremors, syncope, speech difficulty, weakness, light-headedness, numbness and headaches.   Hematological:  Negative for adenopathy. Does not bruise/bleed easily.   Psychiatric/Behavioral:  Negative for agitation, behavioral problems and sleep disturbance. The patient is not nervous/anxious.         Vitals:    12/26/23 1058   BP: 119/70   Pulse: 67   Temp: 36.7 °C (98 °F)   SpO2: 98%        Physical Exam  Vitals reviewed.   Constitutional:       Appearance: Normal appearance.   HENT:      Head: Normocephalic and atraumatic.   Eyes:      Extraocular Movements: Extraocular movements intact.   Cardiovascular:      Rate and Rhythm: Normal rate and regular rhythm.      Heart sounds: No murmur heard.     No friction rub. No gallop.      Comments: 2+ edema lower extremities   Pulmonary:      Effort: Pulmonary effort is normal. No respiratory distress.      Breath sounds: Normal breath sounds. No stridor. No wheezing, rhonchi or rales.   Chest:      Chest wall: No tenderness.   Abdominal:      General: Abdomen is flat. There is no distension.       Palpations: Abdomen is soft. There is no mass.      Tenderness: There is no abdominal tenderness.   Musculoskeletal:         General: Normal range of motion.      Cervical back: Normal range of motion.      Right lower leg: No edema.      Left lower leg: No edema.   Skin:     General: Skin is warm and dry.   Neurological:      Mental Status: He is alert and oriented to person, place, and time.   Psychiatric:         Mood and Affect: Mood normal.         Behavior: Behavior normal.

## 2023-12-26 NOTE — PATIENT INSTRUCTIONS
I am going to obtain a follow-up CT scan of the chest to determine the extent of the pleural effusion versus paralyzed diaphragm for the changes on the right side of the chest.  He will likely will need increasing doses of diuretics.

## 2023-12-28 ENCOUNTER — OFFICE VISIT (OUTPATIENT)
Dept: CARDIOLOGY | Facility: CLINIC | Age: 88
End: 2023-12-28
Payer: MEDICARE

## 2023-12-28 VITALS
HEART RATE: 72 BPM | BODY MASS INDEX: 32.45 KG/M2 | DIASTOLIC BLOOD PRESSURE: 64 MMHG | SYSTOLIC BLOOD PRESSURE: 115 MMHG | WEIGHT: 183.13 LBS | HEIGHT: 63 IN | OXYGEN SATURATION: 93 %

## 2023-12-28 DIAGNOSIS — I48.91 ATRIAL FIBRILLATION, UNSPECIFIED TYPE (MULTI): ICD-10-CM

## 2023-12-28 DIAGNOSIS — E78.5 HYPERLIPIDEMIA, UNSPECIFIED HYPERLIPIDEMIA TYPE: ICD-10-CM

## 2023-12-28 DIAGNOSIS — I25.10 3-VESSEL CORONARY ARTERY DISEASE: Primary | ICD-10-CM

## 2023-12-28 DIAGNOSIS — I50.32 CHRONIC DIASTOLIC HEART FAILURE (MULTI): ICD-10-CM

## 2023-12-28 DIAGNOSIS — I10 BENIGN ESSENTIAL HYPERTENSION: ICD-10-CM

## 2023-12-28 PROCEDURE — 3074F SYST BP LT 130 MM HG: CPT | Performed by: INTERNAL MEDICINE

## 2023-12-28 PROCEDURE — 3078F DIAST BP <80 MM HG: CPT | Performed by: INTERNAL MEDICINE

## 2023-12-28 PROCEDURE — 1126F AMNT PAIN NOTED NONE PRSNT: CPT | Performed by: INTERNAL MEDICINE

## 2023-12-28 PROCEDURE — 1036F TOBACCO NON-USER: CPT | Performed by: INTERNAL MEDICINE

## 2023-12-28 PROCEDURE — 1160F RVW MEDS BY RX/DR IN RCRD: CPT | Performed by: INTERNAL MEDICINE

## 2023-12-28 PROCEDURE — 1159F MED LIST DOCD IN RCRD: CPT | Performed by: INTERNAL MEDICINE

## 2023-12-28 PROCEDURE — 99215 OFFICE O/P EST HI 40 MIN: CPT | Performed by: INTERNAL MEDICINE

## 2023-12-28 RX ORDER — METOLAZONE 2.5 MG/1
2.5 TABLET ORAL
Qty: 8 TABLET | Refills: 3 | Status: SHIPPED | OUTPATIENT
Start: 2023-12-28 | End: 2023-12-29

## 2023-12-28 ASSESSMENT — COLUMBIA-SUICIDE SEVERITY RATING SCALE - C-SSRS
2. HAVE YOU ACTUALLY HAD ANY THOUGHTS OF KILLING YOURSELF?: NO
1. IN THE PAST MONTH, HAVE YOU WISHED YOU WERE DEAD OR WISHED YOU COULD GO TO SLEEP AND NOT WAKE UP?: NO
6. HAVE YOU EVER DONE ANYTHING, STARTED TO DO ANYTHING, OR PREPARED TO DO ANYTHING TO END YOUR LIFE?: NO

## 2023-12-28 ASSESSMENT — PAIN SCALES - GENERAL: PAINLEVEL: 0-NO PAIN

## 2023-12-28 ASSESSMENT — ENCOUNTER SYMPTOMS
DEPRESSION: 0
OCCASIONAL FEELINGS OF UNSTEADINESS: 0

## 2023-12-28 NOTE — PROGRESS NOTES
Primary Care Physician: Trice June DO  Date of Visit: 2023  2:00 PM EST  Location of visit: [facility] [place] [address] [facility] / Summary:   This is a 89-year-old man with history of coronary disease, hypertension, hyperlipidemia, diabetes, atrial fibrillation and congestive heart failure  In 2012 he had cardiac catheterization that showed diffuse severe coronary artery disease and medical therapy was recommended  On 14 he had chest pain and shortness of breath and was admitted to the hospital with an acute myocardial infarction. He had cardiac catheterization that showed diffuse severe disease and a new 95% lesion in the distal left anterior descending artery. He had stents put in the proximal and distal left anterior descending artery  His metoprolol was increased from 25 mg twice a day to 75 mg twice a day and this led him to have severe bradycardia in the 30s and he was symptomatic and the metoprolol dose had to be decreased and the patient on his own went back to 25 mg twice a day. Heart rate today on that dose is 57/m. No dizziness, no lightheadedness and no syncope.   One brother  at 94, one sister is 97 years old and one brother  at age of 89 and one sister  at 83, 2 others are younger  He had an echocardiogram on September 15, 2021 that showed normal left ventricular systolic function with moderately increased septal thickness and severely dilated left atrium  Blood test on 2023 was all normal except for a glucose of 216 hemoglobin A1c 6.8 hematocrit 37.3%.  Cholesterol was 93 with HDL 39 LDL 33 triglycerides 105.  INR on 2023 was 2.8  He can only walk 1 block and he will get short of breath.   Echocardiogram on 2023 showed normal left ventricular function and severe left atrial enlargement   He continues to do well with no chest pain and can only walk 1 block limited by his back  He is known to have problem with his right hemidiaphragm and  pulmonary service recommended repeat CT scan of the chest  Over the past 2 months he had increased shortness of breath and worse leg edema and gained 4 pounds since last visit  On examination his lungs are clear with decreased breath sounds at the right lower base, the liver is not enlarged and he has +3 leg edema  For better diuresis I am starting him on metolazone 2.5 mg once a week for half an hour by his Lasix dose and on that day take an additional dose of potassium  We had a long discussion about diet and losing weight. Losing weight will help to control his diabetes. I did recommend 1500 poornima per day and no eating between meals.  He would continue his current medical therapy and followup in 4 months.  His wife will call me about the response to the the new therapy. Follow-up with pulmonary service  I may later consider using Jardiance as an additional therapy        Past Medical History:  Past Medical History:   Diagnosis Date    Acute myocardial infarction, unspecified (CMS/HCC) 11/09/2022    Acute myocardial infarction    Acute upper respiratory infection, unspecified 04/10/2018    Viral URI    Atherosclerotic heart disease of native coronary artery with unspecified angina pectoris (CMS/HCC) 11/21/2022    Athscl heart disease of native cor art w unsp ang pctrs    Benign neoplasm of colon, unspecified 08/26/2016    Colonic adenoma    Encounter for screening for malignant neoplasm of colon 07/24/2014    Colon cancer screening    Hyperglycemia 01/26/2023    Hyponatremia 01/26/2023    Obesity, unspecified 10/28/2020    Class 1 obesity with body mass index (BMI) of 32.0 to 32.9 in adult    Obesity, unspecified 11/09/2022    Obesity, Class I, BMI 30.0-34.9 (see actual BMI)    Olecranon bursitis, unspecified elbow 02/19/2020    Olecranon bursitis    Osteoarthritis of knee, unspecified 10/03/2017    Osteoarthrosis of knee    Other fecal abnormalities 08/26/2016    Guaiac positive stools    Personal history of other  diseases of the circulatory system 08/17/2016    History of angina pectoris    Personal history of other diseases of the musculoskeletal system and connective tissue     History of arthritis    Personal history of other diseases of the nervous system and sense organs     History of cataract    Personal history of other diseases of the respiratory system 04/10/2018    History of acute bronchitis    Personal history of other drug therapy 10/01/2019    History of influenza vaccination    Personal history of other endocrine, nutritional and metabolic disease 12/26/2018    History of vitamin D deficiency    Personal history of other endocrine, nutritional and metabolic disease 02/26/2015    History of hypokalemia    Personal history of other specified conditions     History of heartburn    Personal history of thrombophlebitis 12/29/2015    History of deep vein thrombophlebitis of lower extremity    Pure hypercholesterolemia, unspecified 01/02/2019    Pure hypercholesterolemia, unspecified    Unspecified atrial fibrillation (CMS/HCC) 01/03/2023    Atrial fibrillation    Unspecified injury of head, sequela 11/29/2019    CHI (closed head injury), sequela        Past Surgical History:  Past Surgical History:   Procedure Laterality Date    APPENDECTOMY  01/20/2014    Appendectomy    CATARACT EXTRACTION  01/20/2014    Cataract Surgery    TONSILLECTOMY  01/20/2014    Tonsillectomy          Social History:   reports that he has never smoked. He has never used smokeless tobacco. He reports current alcohol use. He reports that he does not use drugs.     Family History:  family history includes Brain Tumor in his sister; Breast cancer in his niece; Coronary artery disease in his mother; Heart failure in his brother, mother, and sister; Lung cancer in his brother and another family member; Malignant Neoplasm in his brother; Mild Cognitive Impairment in his brother; Myocardial Infarction in his sister; Prostate cancer in his brother;  Stomach cancer in his father.      Allergies:  No Known Allergies    Outpatient Medications:  Current Outpatient Medications   Medication Instructions    acetaminophen (Tylenol) 500 mg tablet 1 tablet, oral, Every 8 hours PRN, Repeat every 4 to 6 hours as needed    aspirin 81 mg, oral, Daily    atorvastatin (LIPITOR) 80 mg, oral, Daily    cholecalciferol (Vitamin D-3) 50 mcg (2,000 unit) capsule 1 capsule, oral, Daily    cyclobenzaprine (Flexeril) 5 mg tablet 1 tablet, oral, Nightly PRN    dilTIAZem ER (TIAZAC) 360 mg, oral, Nightly    doxazosin (CARDURA) 6 mg, oral, Daily    furosemide (LASIX) 40 mg, oral, Daily    glucosamine/chondr nelson A sod (OSTEO BI-FLEX ORAL) 2 tablets, oral, Daily, Joint Sheild    loratadine (Claritin Liqui-Gel) 10 mg capsule 1 capsule, oral, Daily PRN    metFORMIN XR (GLUCOPHAGE-XR) 500 mg, oral, Daily    metOLazone (ZAROXOLYN) 2.5 mg, oral, Weekly    metoprolol tartrate (LOPRESSOR) 25 mg, oral, 2 times daily    multivitamin with minerals (multivitamin-iron-folic acid) tablet 1 tablet, oral, Daily    multivitamin/iron/folic acid (CENTRUM ORAL) 1 tablet, oral, Daily, Centrum Adult Oral Tablet    nitroglycerin (Nitrostat) 0.4 mg SL tablet 1 tablet, sublingual, Every 5 min PRN    pantoprazole (ProtoNix) 40 mg EC tablet TAKE 1 TABLET BY MOUTH EVERY MORNING 30 MINUTES BEFORE BREAKFAST    potassium chloride CR (Klor-Con M20) 20 mEq ER tablet TAKE 1 TABLET BY MOUTH EVERY DAY    ramipril (ALTACE) 5 mg, oral, Daily    warfarin (Coumadin) 5 mg tablet Take 1 tablet by mouth daily or as directed       Physical Exam:  Constitutional: alert and in no acute distress.  Eyes: no erythema, swelling or discharge from the eye.  Neck: neck is supple, symmetric, trachea midline, no masses, and no thyromegaly.  Pulmonary: no increased work of breathing or signs of respiratory distress and lungs clear to auscultation.  Cardiovascular: carotid pulses 2+ bilaterally with no bruit, JVP was normal, no thrills, regular  "rhythm, normal S1 and S2, no murmurs, pedal pulses 2+ bilaterally and +3  pitting edema.  Abdomen: Abdomen non-tender, no masses, and no hepatomegaly.  Skin: Skin warm and dry, normal skin turgor  Neurologic: non- focal neurologic examination.  Psychiatric: judgement and insight is normal and oriented to person place and time.      Vitals:    12/28/23 1408   BP: 115/64   BP Location: Right leg   Patient Position: Sitting   BP Cuff Size: Large adult   Pulse: 72   SpO2: 93%   Weight: 83.1 kg (183 lb 2 oz)   Height: 1.6 m (5' 3\")     Wt Readings from Last 5 Encounters:   12/28/23 83.1 kg (183 lb 2 oz)   12/26/23 82.6 kg (182 lb)   11/20/23 83.8 kg (184 lb 12.8 oz)   09/29/23 77.4 kg (170 lb 9.6 oz)   09/13/23 81.4 kg (179 lb 6 oz)     Body mass index is 32.44 kg/m².      Last Labs:  CMP:  Recent Labs     10/17/23  1121 05/31/23  1119 03/21/23  1115 11/09/22  1117 07/06/22  0835    138 139 139 139   K 4.2 4.2 4.3 4.5 4.1    104 103 101 102   CO2 27 28 27 27 26   ANIONGAP 14 10 13 16 15   BUN 21 23 24* 22 24*   CREATININE 0.94 1.00 0.90 0.99 1.00   EGFR 78  --   --   --   --    GLUCOSE 217* 166* 214* 133* 149*     Recent Labs     10/17/23  1121 05/31/23  1119 03/21/23  1115 11/09/22  1117 07/06/22  0835   ALBUMIN 3.8 4.0 3.8 4.1 4.3   ALKPHOS 77 88 72 72 70   ALT 14 14 14 14 18   AST 17 19 18 18 21   BILITOT 0.9 0.8 0.7 1.0 0.8     CBC:  Recent Labs     10/17/23  1121 03/21/23  1115 11/09/22  1117 07/06/22  0835 03/02/22  1014   WBC 5.6 6.1 6.1 6.1 6.3   HGB 11.5* 11.7* 12.5* 12.9* 12.3*   HCT 37.3* 37.9* 38.9* 38.9* 38.9*    156 168 169 157   MCV 97 94 95 91 94     COAG:   Recent Labs     12/20/23  1115 11/16/23  1210 10/17/23  1121 09/19/23  1112 08/16/23  1001   INR 2.9* 3.2* 2.7* 2.8* 2.8*     HEME/ENDO:  Recent Labs     10/17/23  1121 05/31/23  1119 11/09/22  1117 07/06/22  0835 03/02/22  1014 11/22/21  1200   IRONSAT  --   --   --  18*  --  22*   HGBA1C 6.8* 6.9* 6.4* 7.2*   < > 7.1*    < > = " "values in this interval not displayed.      CARDIAC: No results for input(s): \"LDH\", \"CKMB\", \"TROPHS\", \"BNP\" in the last 29073 hours.    No lab exists for component: \"CK\", \"CKMBP\"  Recent Labs     11/09/22  1117 07/06/22  0835 06/22/21  0815   CHOL 93 103 93   LDLF 33 38 30   HDL 39.0* 42.4 38.9*   TRIG 105 113 120       Last Cardiology Tests:  ECG:      Echo:  Echo Results:  No results found for this or any previous visit from the past 3650 days.       Cath:      Stress Test:  Stress Results:  No results found for this or any previous visit from the past 365 days.         Cardiac Imaging:  XR chest 2 views  Narrative: Interpreted By:  Milana Chambers,   STUDY:  Chest, 2 views.      INDICATION:  Signs/Symptoms:bray.      COMPARISON:  06/19/2023.      ACCESSION NUMBER(S):  [id number]      ORDERING CLINICIAN:  GILBERT SANDY      FINDINGS:  The cardiomediastinal silhouette size is mildly enlarged and stable.  Hypoinflated lungs.  No focal consolidation. No pneumothorax.  Redemonstration of moderate to large sized right pleural effusion  with associated right basilar atelectasis likely present. Left lung  is relatively clear. Midthoracic spine degenerative changes with disc  height loss and osteophytes.      Impression: 1. Redemonstration of chronic appearing moderate to large right  pleural effusion with associated right basilar atelectasis.  Superimposed right lower lung pneumonia although less likely can not  be excluded in appropriate clinical scenario.      MACRO:  None.      Signed by: Milana Chambers 12/27/2023 6:27 PM  Dictation workstation:   [id number]          Orders:  No orders of the defined types were placed in this encounter.     Followup Appts:  Future Appointments   Date Time Provider Department Center   1/9/2024  8:45 AM [facility] [id number] [place] 1 [id number] [facility]   2/26/2024  2:00 PM Trice June DO [id number] [facility]   5/23/2024  4:40 PM Jovanny Ruggiero MD [id number] [place]         "   ____________________________________________________________  Jovanny Ruggiero MD  [facility] Physician: Trice June DO  Date of Visit: 12/28/2023  2:00 PM EST  Location of visit: [facility] [place] [address]                  Past Medical History:  Past Medical History:   Diagnosis Date    Acute myocardial infarction, unspecified (CMS/HCC) 11/09/2022    Acute myocardial infarction    Acute upper respiratory infection, unspecified 04/10/2018    Viral URI    Atherosclerotic heart disease of native coronary artery with unspecified angina pectoris (CMS/HCC) 11/21/2022    Athscl heart disease of native cor art w unsp ang pctrs    Benign neoplasm of colon, unspecified 08/26/2016    Colonic adenoma    Encounter for screening for malignant neoplasm of colon 07/24/2014    Colon cancer screening    Hyperglycemia 01/26/2023    Hyponatremia 01/26/2023    Obesity, unspecified 10/28/2020    Class 1 obesity with body mass index (BMI) of 32.0 to 32.9 in adult    Obesity, unspecified 11/09/2022    Obesity, Class I, BMI 30.0-34.9 (see actual BMI)    Olecranon bursitis, unspecified elbow 02/19/2020    Olecranon bursitis    Osteoarthritis of knee, unspecified 10/03/2017    Osteoarthrosis of knee    Other fecal abnormalities 08/26/2016    Guaiac positive stools    Personal history of other diseases of the circulatory system 08/17/2016    History of angina pectoris    Personal history of other diseases of the musculoskeletal system and connective tissue     History of arthritis    Personal history of other diseases of the nervous system and sense organs     History of cataract    Personal history of other diseases of the respiratory system 04/10/2018    History of acute bronchitis    Personal history of other drug therapy 10/01/2019    History of influenza vaccination    Personal history of other endocrine, nutritional and metabolic disease 12/26/2018    History of vitamin D deficiency    Personal history of other  endocrine, nutritional and metabolic disease 02/26/2015    History of hypokalemia    Personal history of other specified conditions     History of heartburn    Personal history of thrombophlebitis 12/29/2015    History of deep vein thrombophlebitis of lower extremity    Pure hypercholesterolemia, unspecified 01/02/2019    Pure hypercholesterolemia, unspecified    Unspecified atrial fibrillation (CMS/HCC) 01/03/2023    Atrial fibrillation    Unspecified injury of head, sequela 11/29/2019    CHI (closed head injury), sequela        Past Surgical History:  Past Surgical History:   Procedure Laterality Date    APPENDECTOMY  01/20/2014    Appendectomy    CATARACT EXTRACTION  01/20/2014    Cataract Surgery    TONSILLECTOMY  01/20/2014    Tonsillectomy          Social History:   reports that he has never smoked. He has never used smokeless tobacco. He reports current alcohol use. He reports that he does not use drugs.     Family History:  family history includes Brain Tumor in his sister; Breast cancer in his niece; Coronary artery disease in his mother; Heart failure in his brother, mother, and sister; Lung cancer in his brother and another family member; Malignant Neoplasm in his brother; Mild Cognitive Impairment in his brother; Myocardial Infarction in his sister; Prostate cancer in his brother; Stomach cancer in his father.      Allergies:  No Known Allergies    Outpatient Medications:  Current Outpatient Medications   Medication Instructions    acetaminophen (Tylenol) 500 mg tablet 1 tablet, oral, Every 8 hours PRN, Repeat every 4 to 6 hours as needed    aspirin 81 mg, oral, Daily    atorvastatin (LIPITOR) 80 mg, oral, Daily    cholecalciferol (Vitamin D-3) 50 mcg (2,000 unit) capsule 1 capsule, oral, Daily    cyclobenzaprine (Flexeril) 5 mg tablet 1 tablet, oral, Nightly PRN    dilTIAZem ER (TIAZAC) 360 mg, oral, Nightly    doxazosin (CARDURA) 6 mg, oral, Daily    furosemide (LASIX) 40 mg, oral, Daily     "glucosamine/chondr nelson A sod (OSTEO BI-FLEX ORAL) 2 tablets, oral, Daily, Joint Sheild    loratadine (Claritin Liqui-Gel) 10 mg capsule 1 capsule, oral, Daily PRN    metFORMIN XR (GLUCOPHAGE-XR) 500 mg, oral, Daily    metoprolol tartrate (LOPRESSOR) 25 mg, oral, 2 times daily    multivitamin with minerals (multivitamin-iron-folic acid) tablet 1 tablet, oral, Daily    multivitamin/iron/folic acid (CENTRUM ORAL) 1 tablet, oral, Daily, Centrum Adult Oral Tablet    nitroglycerin (Nitrostat) 0.4 mg SL tablet 1 tablet, sublingual, Every 5 min PRN    pantoprazole (ProtoNix) 40 mg EC tablet TAKE 1 TABLET BY MOUTH EVERY MORNING 30 MINUTES BEFORE BREAKFAST    potassium chloride CR (Klor-Con M20) 20 mEq ER tablet TAKE 1 TABLET BY MOUTH EVERY DAY    ramipril (ALTACE) 5 mg, oral, Daily    warfarin (Coumadin) 5 mg tablet Take 1 tablet by mouth daily or as directed       Physical Exam:  Constitutional: alert and in no acute distress.  Eyes: no erythema, swelling or discharge from the eye.  Neck: neck is supple, symmetric, trachea midline, no masses, and no thyromegaly.  Pulmonary: no increased work of breathing or signs of respiratory distress and lungs clear to auscultation.  Cardiovascular: carotid pulses 2+ bilaterally with no bruit, JVP was normal, no thrills, regular rhythm, normal S1 and S2, no murmurs, pedal pulses 2+ bilaterally and no pitting edema.  Abdomen: Abdomen non-tender, no masses, and no hepatomegaly.  Skin: Skin warm and dry, normal skin turgor  Neurologic: non- focal neurologic examination.  Psychiatric: judgement and insight is normal and oriented to person place and time.      Vitals:    12/28/23 1408   BP: 115/64   BP Location: Right leg   Patient Position: Sitting   BP Cuff Size: Large adult   Pulse: 72   SpO2: 93%   Weight: 83.1 kg (183 lb 2 oz)   Height: 1.6 m (5' 3\")     Wt Readings from Last 5 Encounters:   12/28/23 83.1 kg (183 lb 2 oz)   12/26/23 82.6 kg (182 lb)   11/20/23 83.8 kg (184 lb 12.8 oz) " "  09/29/23 77.4 kg (170 lb 9.6 oz)   09/13/23 81.4 kg (179 lb 6 oz)     Body mass index is 32.44 kg/m².      Last Labs:  CMP:  Recent Labs     10/17/23  1121 05/31/23  1119 03/21/23  1115 11/09/22  1117 07/06/22  0835    138 139 139 139   K 4.2 4.2 4.3 4.5 4.1    104 103 101 102   CO2 27 28 27 27 26   ANIONGAP 14 10 13 16 15   BUN 21 23 24* 22 24*   CREATININE 0.94 1.00 0.90 0.99 1.00   EGFR 78  --   --   --   --    GLUCOSE 217* 166* 214* 133* 149*     Recent Labs     10/17/23  1121 05/31/23  1119 03/21/23  1115 11/09/22  1117 07/06/22  0835   ALBUMIN 3.8 4.0 3.8 4.1 4.3   ALKPHOS 77 88 72 72 70   ALT 14 14 14 14 18   AST 17 19 18 18 21   BILITOT 0.9 0.8 0.7 1.0 0.8     CBC:  Recent Labs     10/17/23  1121 03/21/23  1115 11/09/22  1117 07/06/22  0835 03/02/22  1014   WBC 5.6 6.1 6.1 6.1 6.3   HGB 11.5* 11.7* 12.5* 12.9* 12.3*   HCT 37.3* 37.9* 38.9* 38.9* 38.9*    156 168 169 157   MCV 97 94 95 91 94     COAG:   Recent Labs     12/20/23  1115 11/16/23  1210 10/17/23  1121 09/19/23  1112 08/16/23  1001   INR 2.9* 3.2* 2.7* 2.8* 2.8*     HEME/ENDO:  Recent Labs     10/17/23  1121 05/31/23  1119 11/09/22  1117 07/06/22  0835 03/02/22  1014 11/22/21  1200   IRONSAT  --   --   --  18*  --  22*   HGBA1C 6.8* 6.9* 6.4* 7.2*   < > 7.1*    < > = values in this interval not displayed.      CARDIAC: No results for input(s): \"LDH\", \"CKMB\", \"TROPHS\", \"BNP\" in the last 82836 hours.    No lab exists for component: \"CK\", \"CKMBP\"  Recent Labs     11/09/22  1117 07/06/22  0835 06/22/21  0815   CHOL 93 103 93   LDLF 33 38 30   HDL 39.0* 42.4 38.9*   TRIG 105 113 120       Last Cardiology Tests:  ECG:      Echo:  Echo Results:  No results found for this or any previous visit from the past 3650 days.       Cath:      Stress Test:  Stress Results:  No results found for this or any previous visit from the past 365 days.         Cardiac Imaging:  XR chest 2 views  Narrative: Interpreted By:  Milana Chambers, "   STUDY:  Chest, 2 views.      INDICATION:  Signs/Symptoms:bray.      COMPARISON:  06/19/2023.      ACCESSION NUMBER(S):  [id number]      ORDERING CLINICIAN:  GILBERT SANDY      FINDINGS:  The cardiomediastinal silhouette size is mildly enlarged and stable.  Hypoinflated lungs.  No focal consolidation. No pneumothorax.  Redemonstration of moderate to large sized right pleural effusion  with associated right basilar atelectasis likely present. Left lung  is relatively clear. Midthoracic spine degenerative changes with disc  height loss and osteophytes.      Impression: 1. Redemonstration of chronic appearing moderate to large right  pleural effusion with associated right basilar atelectasis.  Superimposed right lower lung pneumonia although less likely can not  be excluded in appropriate clinical scenario.      MACRO:  None.      Signed by: Milana Chambers 12/27/2023 6:27 PM  Dictation workstation:   [id number]          Orders:  No orders of the defined types were placed in this encounter.     Followup Appts:  Future Appointments   Date Time Provider Department Center   1/9/2024  8:45 AM [facility] [place] [id number] CT 1 [id number] [facility]   2/26/2024  2:00 PM Trice June DO [id number]           ____________________________________________________________  Jovanny Ruggiero MD  [facility]

## 2024-01-03 ENCOUNTER — APPOINTMENT (OUTPATIENT)
Dept: PRIMARY CARE | Facility: CLINIC | Age: 89
End: 2024-01-03
Payer: MEDICARE

## 2024-01-09 ENCOUNTER — ANCILLARY PROCEDURE (OUTPATIENT)
Dept: RADIOLOGY | Facility: CLINIC | Age: 89
End: 2024-01-09
Payer: MEDICARE

## 2024-01-09 ENCOUNTER — DOCUMENTATION (OUTPATIENT)
Dept: PULMONOLOGY | Facility: HOSPITAL | Age: 89
End: 2024-01-09
Payer: MEDICARE

## 2024-01-09 DIAGNOSIS — R06.09 DYSPNEA ON EXERTION: ICD-10-CM

## 2024-01-09 PROCEDURE — 71250 CT THORAX DX C-: CPT

## 2024-01-09 PROCEDURE — 71250 CT THORAX DX C-: CPT | Performed by: RADIOLOGY

## 2024-01-09 NOTE — PROGRESS NOTES
Patient was started on increased diuretics per Dr. Ruggiero     His CT scan clearly demonstrates an increased right effusion.  I will see if the diuretics help over the next 4 to 6 weeks and if not we will proceed with thoracentesis at that point in time.

## 2024-01-16 ENCOUNTER — LAB (OUTPATIENT)
Dept: LAB | Facility: LAB | Age: 89
End: 2024-01-16
Payer: MEDICARE

## 2024-01-16 DIAGNOSIS — I25.10 3-VESSEL CORONARY ARTERY DISEASE: ICD-10-CM

## 2024-01-16 DIAGNOSIS — I10 BENIGN ESSENTIAL HYPERTENSION: ICD-10-CM

## 2024-01-16 DIAGNOSIS — E78.5 HYPERLIPIDEMIA, UNSPECIFIED HYPERLIPIDEMIA TYPE: ICD-10-CM

## 2024-01-16 DIAGNOSIS — E11.9 TYPE 2 DIABETES MELLITUS WITHOUT COMPLICATION, WITHOUT LONG-TERM CURRENT USE OF INSULIN (MULTI): ICD-10-CM

## 2024-01-16 LAB
ALBUMIN SERPL BCP-MCNC: 3.8 G/DL (ref 3.4–5)
ALP SERPL-CCNC: 79 U/L (ref 33–136)
ALT SERPL W P-5'-P-CCNC: 14 U/L (ref 10–52)
ANION GAP SERPL CALC-SCNC: 14 MMOL/L (ref 10–20)
AST SERPL W P-5'-P-CCNC: 19 U/L (ref 9–39)
BASOPHILS # BLD AUTO: 0.02 X10*3/UL (ref 0–0.1)
BASOPHILS NFR BLD AUTO: 0.3 %
BILIRUB SERPL-MCNC: 0.8 MG/DL (ref 0–1.2)
BUN SERPL-MCNC: 32 MG/DL (ref 6–23)
CALCIUM SERPL-MCNC: 9 MG/DL (ref 8.6–10.6)
CHLORIDE SERPL-SCNC: 98 MMOL/L (ref 98–107)
CHOLEST SERPL-MCNC: 84 MG/DL (ref 0–199)
CHOLESTEROL/HDL RATIO: 2.2
CO2 SERPL-SCNC: 27 MMOL/L (ref 21–32)
CREAT SERPL-MCNC: 1.08 MG/DL (ref 0.5–1.3)
CREAT UR-MCNC: 65.4 MG/DL (ref 20–370)
EGFRCR SERPLBLD CKD-EPI 2021: 66 ML/MIN/1.73M*2
EOSINOPHIL # BLD AUTO: 0.32 X10*3/UL (ref 0–0.4)
EOSINOPHIL NFR BLD AUTO: 5.5 %
ERYTHROCYTE [DISTWIDTH] IN BLOOD BY AUTOMATED COUNT: 14.8 % (ref 11.5–14.5)
EST. AVERAGE GLUCOSE BLD GHB EST-MCNC: 154 MG/DL
GLUCOSE SERPL-MCNC: 242 MG/DL (ref 74–99)
HBA1C MFR BLD: 7 %
HCT VFR BLD AUTO: 35.8 % (ref 41–52)
HDLC SERPL-MCNC: 38.6 MG/DL
HGB BLD-MCNC: 11.8 G/DL (ref 13.5–17.5)
IMM GRANULOCYTES # BLD AUTO: 0.02 X10*3/UL (ref 0–0.5)
IMM GRANULOCYTES NFR BLD AUTO: 0.3 % (ref 0–0.9)
INR PPP: 2.6 (ref 0.9–1.1)
LDLC SERPL CALC-MCNC: 26 MG/DL
LYMPHOCYTES # BLD AUTO: 0.97 X10*3/UL (ref 0.8–3)
LYMPHOCYTES NFR BLD AUTO: 16.7 %
MCH RBC QN AUTO: 29.9 PG (ref 26–34)
MCHC RBC AUTO-ENTMCNC: 33 G/DL (ref 32–36)
MCV RBC AUTO: 91 FL (ref 80–100)
MICROALBUMIN UR-MCNC: 42.6 MG/L
MICROALBUMIN/CREAT UR: 65.1 UG/MG CREAT
MONOCYTES # BLD AUTO: 0.64 X10*3/UL (ref 0.05–0.8)
MONOCYTES NFR BLD AUTO: 11 %
NEUTROPHILS # BLD AUTO: 3.84 X10*3/UL (ref 1.6–5.5)
NEUTROPHILS NFR BLD AUTO: 66.2 %
NON HDL CHOLESTEROL: 45 MG/DL (ref 0–149)
NRBC BLD-RTO: 0 /100 WBCS (ref 0–0)
PLATELET # BLD AUTO: 164 X10*3/UL (ref 150–450)
POTASSIUM SERPL-SCNC: 4.2 MMOL/L (ref 3.5–5.3)
PROT SERPL-MCNC: 6.9 G/DL (ref 6.4–8.2)
PROTHROMBIN TIME: 29.3 SECONDS (ref 9.8–12.8)
RBC # BLD AUTO: 3.94 X10*6/UL (ref 4.5–5.9)
SODIUM SERPL-SCNC: 135 MMOL/L (ref 136–145)
TRIGL SERPL-MCNC: 98 MG/DL (ref 0–149)
VLDL: 20 MG/DL (ref 0–40)
WBC # BLD AUTO: 5.8 X10*3/UL (ref 4.4–11.3)

## 2024-01-16 PROCEDURE — 85025 COMPLETE CBC W/AUTO DIFF WBC: CPT

## 2024-01-16 PROCEDURE — 85610 PROTHROMBIN TIME: CPT

## 2024-01-16 PROCEDURE — 82570 ASSAY OF URINE CREATININE: CPT

## 2024-01-16 PROCEDURE — 80053 COMPREHEN METABOLIC PANEL: CPT

## 2024-01-16 PROCEDURE — 36415 COLL VENOUS BLD VENIPUNCTURE: CPT

## 2024-01-16 PROCEDURE — 83036 HEMOGLOBIN GLYCOSYLATED A1C: CPT

## 2024-01-16 PROCEDURE — 82043 UR ALBUMIN QUANTITATIVE: CPT

## 2024-01-16 PROCEDURE — 80061 LIPID PANEL: CPT

## 2024-01-20 DIAGNOSIS — I10 BENIGN ESSENTIAL HYPERTENSION: ICD-10-CM

## 2024-01-21 RX ORDER — POTASSIUM CHLORIDE 20 MEQ/1
20 TABLET, EXTENDED RELEASE ORAL DAILY
Qty: 90 TABLET | Refills: 0 | Status: SHIPPED | OUTPATIENT
Start: 2024-01-21 | End: 2024-04-11

## 2024-01-24 ENCOUNTER — APPOINTMENT (OUTPATIENT)
Dept: PRIMARY CARE | Facility: CLINIC | Age: 89
End: 2024-01-24
Payer: MEDICARE

## 2024-01-29 DIAGNOSIS — E11.9 TYPE 2 DIABETES MELLITUS WITHOUT COMPLICATION, WITHOUT LONG-TERM CURRENT USE OF INSULIN (MULTI): ICD-10-CM

## 2024-01-31 RX ORDER — METFORMIN HYDROCHLORIDE 500 MG/1
500 TABLET, EXTENDED RELEASE ORAL DAILY
Qty: 90 TABLET | Refills: 0 | Status: SHIPPED | OUTPATIENT
Start: 2024-01-31 | End: 2024-04-30

## 2024-02-13 ENCOUNTER — HOSPITAL ENCOUNTER (OUTPATIENT)
Dept: RADIOLOGY | Facility: CLINIC | Age: 89
Discharge: HOME | End: 2024-02-13
Payer: MEDICARE

## 2024-02-13 ENCOUNTER — OFFICE VISIT (OUTPATIENT)
Dept: PULMONOLOGY | Facility: CLINIC | Age: 89
End: 2024-02-13
Payer: MEDICARE

## 2024-02-13 VITALS
SYSTOLIC BLOOD PRESSURE: 103 MMHG | BODY MASS INDEX: 31.35 KG/M2 | WEIGHT: 177 LBS | RESPIRATION RATE: 16 BRPM | OXYGEN SATURATION: 98 % | TEMPERATURE: 97.7 F | HEART RATE: 45 BPM | DIASTOLIC BLOOD PRESSURE: 61 MMHG

## 2024-02-13 DIAGNOSIS — R06.09 DYSPNEA ON EXERTION: ICD-10-CM

## 2024-02-13 DIAGNOSIS — R06.09 DYSPNEA ON EXERTION: Primary | ICD-10-CM

## 2024-02-13 PROCEDURE — 99214 OFFICE O/P EST MOD 30 MIN: CPT | Performed by: INTERNAL MEDICINE

## 2024-02-13 PROCEDURE — 1160F RVW MEDS BY RX/DR IN RCRD: CPT | Performed by: INTERNAL MEDICINE

## 2024-02-13 PROCEDURE — 1036F TOBACCO NON-USER: CPT | Performed by: INTERNAL MEDICINE

## 2024-02-13 PROCEDURE — 1126F AMNT PAIN NOTED NONE PRSNT: CPT | Performed by: INTERNAL MEDICINE

## 2024-02-13 PROCEDURE — 71046 X-RAY EXAM CHEST 2 VIEWS: CPT | Performed by: RADIOLOGY

## 2024-02-13 PROCEDURE — 71046 X-RAY EXAM CHEST 2 VIEWS: CPT

## 2024-02-13 PROCEDURE — 3078F DIAST BP <80 MM HG: CPT | Performed by: INTERNAL MEDICINE

## 2024-02-13 PROCEDURE — 3074F SYST BP LT 130 MM HG: CPT | Performed by: INTERNAL MEDICINE

## 2024-02-13 PROCEDURE — 1159F MED LIST DOCD IN RCRD: CPT | Performed by: INTERNAL MEDICINE

## 2024-02-13 ASSESSMENT — ENCOUNTER SYMPTOMS
ADENOPATHY: 0
CHOKING: 0
AGITATION: 0
RHINORRHEA: 0
ABDOMINAL PAIN: 0
JOINT SWELLING: 0
FEVER: 0
NAUSEA: 0
TREMORS: 0
EYE DISCHARGE: 0
PALPITATIONS: 0
SHORTNESS OF BREATH: 1
BRUISES/BLEEDS EASILY: 0
APNEA: 0
LIGHT-HEADEDNESS: 0
WEAKNESS: 0
NERVOUS/ANXIOUS: 0
FATIGUE: 0
COUGH: 0
DYSURIA: 0
DIZZINESS: 0
DIFFICULTY URINATING: 0
SLEEP DISTURBANCE: 0
SINUS PRESSURE: 0
WHEEZING: 0
HEADACHES: 0
HEMATURIA: 0
NUMBNESS: 0
SINUS PAIN: 0
STRIDOR: 0
ABDOMINAL DISTENTION: 0
CONSTIPATION: 0
FREQUENCY: 0
SPEECH DIFFICULTY: 0
FACIAL SWELLING: 0
ARTHRALGIAS: 0
EYE REDNESS: 0
UNEXPECTED WEIGHT CHANGE: 0

## 2024-02-13 NOTE — PROGRESS NOTES
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  Over the last month or so his diuretic dose has been increased and he subjectively is 95% improved.  While he still has marked elevation of the right hemidiaphragm on chest x-ray there appears to my inspection that there is a raised and in the right lower lung zone.  Overall clinically he appears improved.    PLAN:   I would continue present therapies and return in 3 month      HISTORY OF PRESENT ILLNESS:           The patient is an 89-year-old with atrial fibrillation elevation of his right hemidiaphragm which based on the records has been present for an extended timeframe and potentially this relates to diaphragmatic paralysis. There is no history of trauma, degenerative arthritis of the neck or other specific causes for this condition. In addition, he was determined to have at increasing right pleural effusion accumulation on the right with 1+ edema on examination. He has had edema for extended timeframe and he does have atrial fibrillation with underlying CAD. I wondered whether he could have a component of heart failure accounting for these findings. The effusion was not marked and probably the most reasonable thing was to empirically treat him with an increased diuretic dose and then see if his effusion resolves. The echocardiogram from September 16, 2021 revealed left ventricular ejection fraction of 55 to 60% with moderately increased left ventricular septal thickness and a severely dilated left atrium. The right ventricle was mildly reduced in function but normal in size. He was seen in follow-up on October 6, 2021 and was doing well from a pulmonary perspective on his diuretics. There was no increasing pleural effusion with elevation of the right hemidiaphragm and he was stable from a  pulmonary perspective. It was felt that potentially his cough was potentiated by Ramipril or reflux. He appears compensated from a pulm perspective and the treatment was to continue as is. When last seen on June 6, 2022 he appeared stable from a pulmonary perspective. We will do chest x-ray showing chronic elevation right hemidiaphragm with blunting of the right costophrenic angle.        The patient was last seen on June 19, 2023 and at that time it was described that the patient over the last several months he has noted some increasing shortness of breath. He saw Dr. Ruggiero last week his diuretic dose was increased to 2 a day. He feels that that made a difference. He has no chest pains or pressures. He has some chronic swelling but it has not changed too much. He is not wheezing or bringing up any phlegm.  The chest x-ray was stable at that point in time.     When seen last on October 26, 2023 it was  outlined that the patient reports that over the last 2 to 3 months he has had increasing shortness of breath particular when he bends over.  He has no chest pains or pressures PND orthopnea.  He has chronic swelling of his legs and wears support hose and is on 40 mg of furosemide a day.  He notes that after he starts doing something he feels little bit better and he takes slow deep breaths and feels less short of breath.        His chest x-ray outline what looks to be increasing pleural fluid accumulation.  He saw cardiology and had been placed on metolazone 2.5 mg once a week.  Jardiance was to be considered.     the CT scan from January 9, 2024 outlined a moderate size right effusion with compressive atelectasis.  There is a small to moderate pericardial effusion.  There was upper abdominal free fluid and stranding in the mesentery.  He did have elevation of the right hemidiaphragm.    The patient has been taking 40 mg of Lasix a day over the last month with a booster of metolazone once a week with extra potassium.   He feels that his breathing is about 95% improved.  His wife states that he is also breathing better at night.  He has more exercise capacity although he still gets short of breath.  No Known Allergies       Current Outpatient Medications:     acetaminophen (Tylenol) 500 mg tablet, Take 1 tablet (500 mg) by mouth every 8 hours if needed for moderate pain (4 - 6). Repeat every 4 to 6 hours as needed, Disp: , Rfl:     aspirin 81 mg EC tablet, Take 1 tablet (81 mg) by mouth once daily., Disp: , Rfl:     atorvastatin (Lipitor) 80 mg tablet, Take 1 tablet (80 mg) by mouth once daily., Disp: 90 tablet, Rfl: 3    cholecalciferol (Vitamin D-3) 50 mcg (2,000 unit) capsule, Take 1 capsule (50 mcg) by mouth once daily., Disp: , Rfl:     cyclobenzaprine (Flexeril) 5 mg tablet, Take 1 tablet (5 mg) by mouth as needed at bedtime for muscle spasms (or Back Pain)., Disp: , Rfl:     dilTIAZem ER (Tiazac) 360 mg 24 hr capsule, TAKE 1 CAPSULE BY MOUTH EVERY NIGHT AT BEDTIME, Disp: 90 capsule, Rfl: 3    doxazosin (Cardura) 4 mg tablet, Take 1.5 tablets (6 mg) by mouth once daily., Disp: 135 tablet, Rfl: 0    furosemide (Lasix) 20 mg tablet, Take 2 tablets (40 mg) by mouth once daily., Disp: 180 tablet, Rfl: 1    glucosamine/chondr nelson A sod (OSTEO BI-FLEX ORAL), Take 2 tablets by mouth 1 (one) time each day. Joint Esdras, Disp: , Rfl:     loratadine (Claritin Liqui-Gel) 10 mg capsule, Take 1 capsule by mouth if needed each day., Disp: , Rfl:     metFORMIN  mg 24 hr tablet, Take 1 tablet (500 mg) by mouth once daily., Disp: 90 tablet, Rfl: 0    metOLazone (Zaroxolyn) 2.5 mg tablet, TAKE 1 TABLET(2.5 MG) BY MOUTH 1 TIME EVERY WEEK, Disp: 12 tablet, Rfl: 0    metoprolol tartrate (Lopressor) 25 mg tablet, Take 1 tablet (25 mg) by mouth 2 times a day., Disp: , Rfl:     multivitamin with minerals (multivitamin-iron-folic acid) tablet, Take 1 tablet by mouth once daily., Disp: , Rfl:     multivitamin/iron/folic acid (CENTRUM ORAL), Take  1 tablet by mouth 1 (one) time each day. Centrum Adult Oral Tablet, Disp: , Rfl:     nitroglycerin (Nitrostat) 0.4 mg SL tablet, Place 1 tablet (0.4 mg) under the tongue every 5 minutes if needed for chest pain., Disp: , Rfl:     pantoprazole (ProtoNix) 40 mg EC tablet, TAKE 1 TABLET BY MOUTH EVERY MORNING 30 MINUTES BEFORE BREAKFAST, Disp: 90 tablet, Rfl: 3    potassium chloride CR 20 mEq ER tablet, Take 1 tablet (20 mEq) by mouth once daily., Disp: 90 tablet, Rfl: 0    ramipril (Altace) 5 mg capsule, Take 1 capsule (5 mg) by mouth once daily., Disp: 90 capsule, Rfl: 3    warfarin (Coumadin) 5 mg tablet, Take 1 tablet by mouth daily or as directed, Disp: 90 tablet, Rfl: 1       Review of Systems   Constitutional:  Negative for fatigue, fever and unexpected weight change.   HENT:  Negative for congestion, facial swelling, nosebleeds, postnasal drip, rhinorrhea, sinus pressure and sinus pain.    Eyes:  Negative for discharge, redness and visual disturbance.   Respiratory:  Positive for shortness of breath. Negative for apnea, cough, choking, wheezing and stridor.    Cardiovascular:  Negative for chest pain, palpitations and leg swelling.   Gastrointestinal:  Negative for abdominal distention, abdominal pain, constipation and nausea.   Endocrine: Negative for cold intolerance and heat intolerance.   Genitourinary:  Negative for difficulty urinating, dysuria, frequency and hematuria.   Musculoskeletal:  Negative for arthralgias, gait problem and joint swelling.   Allergic/Immunologic: Negative for environmental allergies, food allergies and immunocompromised state.   Neurological:  Negative for dizziness, tremors, syncope, speech difficulty, weakness, light-headedness, numbness and headaches.   Hematological:  Negative for adenopathy. Does not bruise/bleed easily.   Psychiatric/Behavioral:  Negative for agitation, behavioral problems and sleep disturbance. The patient is not nervous/anxious.         Vitals:    02/13/24  0952   BP: 103/61   Pulse: (!) 45   Resp: 16   Temp: 36.5 °C (97.7 °F)   SpO2: 98%        Physical Exam  Vitals reviewed.   Constitutional:       Appearance: Normal appearance.   HENT:      Head: Normocephalic and atraumatic.   Eyes:      Extraocular Movements: Extraocular movements intact.   Cardiovascular:      Rate and Rhythm: Normal rate and regular rhythm.      Heart sounds: No murmur heard.     No friction rub. No gallop.   Pulmonary:      Effort: Pulmonary effort is normal. No respiratory distress.      Breath sounds: Normal breath sounds. No stridor. No wheezing, rhonchi or rales.   Chest:      Chest wall: No tenderness.   Abdominal:      General: Abdomen is flat. There is no distension.      Palpations: Abdomen is soft. There is no mass.      Tenderness: There is no abdominal tenderness.   Musculoskeletal:         General: Normal range of motion.      Cervical back: Normal range of motion.      Right lower leg: No edema.      Left lower leg: No edema.   Skin:     General: Skin is warm and dry.   Neurological:      Mental Status: He is alert and oriented to person, place, and time.   Psychiatric:         Mood and Affect: Mood normal.         Behavior: Behavior normal.

## 2024-02-20 ENCOUNTER — LAB (OUTPATIENT)
Dept: LAB | Facility: LAB | Age: 89
End: 2024-02-20
Payer: MEDICARE

## 2024-02-20 DIAGNOSIS — I48.91 ATRIAL FIBRILLATION, UNSPECIFIED TYPE (MULTI): ICD-10-CM

## 2024-02-20 DIAGNOSIS — Z79.01 CHRONIC ANTICOAGULATION: ICD-10-CM

## 2024-02-20 LAB
INR PPP: 2.8 (ref 0.9–1.1)
PROTHROMBIN TIME: 31.5 SECONDS (ref 9.8–12.8)

## 2024-02-20 PROCEDURE — 85610 PROTHROMBIN TIME: CPT

## 2024-02-20 PROCEDURE — 36415 COLL VENOUS BLD VENIPUNCTURE: CPT

## 2024-02-26 ENCOUNTER — OFFICE VISIT (OUTPATIENT)
Dept: PRIMARY CARE | Facility: CLINIC | Age: 89
End: 2024-02-26
Payer: MEDICARE

## 2024-02-26 VITALS
OXYGEN SATURATION: 96 % | DIASTOLIC BLOOD PRESSURE: 62 MMHG | HEART RATE: 66 BPM | WEIGHT: 176 LBS | SYSTOLIC BLOOD PRESSURE: 100 MMHG | BODY MASS INDEX: 31.18 KG/M2

## 2024-02-26 DIAGNOSIS — Z79.01 CHRONIC ANTICOAGULATION: ICD-10-CM

## 2024-02-26 DIAGNOSIS — M25.572 LEFT ANKLE PAIN, UNSPECIFIED CHRONICITY: ICD-10-CM

## 2024-02-26 DIAGNOSIS — I10 BENIGN ESSENTIAL HYPERTENSION: Primary | ICD-10-CM

## 2024-02-26 DIAGNOSIS — E11.9 TYPE 2 DIABETES MELLITUS WITHOUT COMPLICATION, WITHOUT LONG-TERM CURRENT USE OF INSULIN (MULTI): ICD-10-CM

## 2024-02-26 DIAGNOSIS — J90 PLEURAL EFFUSION: ICD-10-CM

## 2024-02-26 PROCEDURE — 3074F SYST BP LT 130 MM HG: CPT | Performed by: SPECIALIST

## 2024-02-26 PROCEDURE — 1160F RVW MEDS BY RX/DR IN RCRD: CPT | Performed by: SPECIALIST

## 2024-02-26 PROCEDURE — 1159F MED LIST DOCD IN RCRD: CPT | Performed by: SPECIALIST

## 2024-02-26 PROCEDURE — 3078F DIAST BP <80 MM HG: CPT | Performed by: SPECIALIST

## 2024-02-26 PROCEDURE — 99213 OFFICE O/P EST LOW 20 MIN: CPT | Performed by: SPECIALIST

## 2024-02-26 PROCEDURE — 1036F TOBACCO NON-USER: CPT | Performed by: SPECIALIST

## 2024-02-26 PROCEDURE — 1126F AMNT PAIN NOTED NONE PRSNT: CPT | Performed by: SPECIALIST

## 2024-02-26 RX ORDER — DOXAZOSIN 4 MG/1
6 TABLET ORAL DAILY
Qty: 135 TABLET | Refills: 1 | Status: SHIPPED | OUTPATIENT
Start: 2024-02-26 | End: 2024-06-04

## 2024-02-26 NOTE — PROGRESS NOTES
Subjective   Patient ID: Ariel Rodríguez is a 89 y.o. male who presents for No chief complaint on file..  HPI    89 yo male Pmhx sig for Afib (on Warfarin), CAD, Rheumatic Fever, DM, Hyperlipidemia, BPH, Colon Polyps and Spinal stenosis here today for follow-up    Saw Cardio, now on another pill.  On Zaroxoyln 1 pill one half hour after now x 8 weeks now.  Has really helped his breathing  No trouble since then.  Saw Pulm and was told fluid on lungs.    No one called him about his INR (will let  know), discuissed INR was 2.8 he'll repeat 3 - 4 wks    Having ankle pain which he thinks is from arthritis.  Feels better walking on it but after sitting and stands on it, he will feel it.  Doesn't feel like it will give out on him.    183# with Dr. Ruggiero 12/2023    New spot on toenail on side will let me know if doesn't resolve    No Known Allergies   Current Outpatient Medications   Medication Instructions    acetaminophen (Tylenol) 500 mg tablet 1 tablet, oral, Every 8 hours PRN, Repeat every 4 to 6 hours as needed    aspirin 81 mg, oral, Daily    atorvastatin (LIPITOR) 80 mg, oral, Daily    cholecalciferol (Vitamin D-3) 50 mcg (2,000 unit) capsule 1 capsule, oral, Daily    cyclobenzaprine (Flexeril) 5 mg tablet 1 tablet, oral, Nightly PRN    dilTIAZem ER (TIAZAC) 360 mg, oral, Nightly    doxazosin (CARDURA) 6 mg, oral, Daily    furosemide (LASIX) 40 mg, oral, Daily    glucosamine/chondr nelson A sod (OSTEO BI-FLEX ORAL) 2 tablets, oral, Daily, Joint Sheild    loratadine (Claritin Liqui-Gel) 10 mg capsule 1 capsule, oral, Daily PRN    metFORMIN XR (GLUCOPHAGE-XR) 500 mg, oral, Daily    metOLazone (Zaroxolyn) 2.5 mg tablet TAKE 1 TABLET(2.5 MG) BY MOUTH 1 TIME EVERY WEEK    metoprolol tartrate (LOPRESSOR) 25 mg, oral, 2 times daily    multivitamin/iron/folic acid (CENTRUM ORAL) 1 tablet, oral, Daily, Centrum Adult Oral Tablet    nitroglycerin (Nitrostat) 0.4 mg SL tablet 1 tablet, sublingual, Every 5 min PRN     pantoprazole (ProtoNix) 40 mg EC tablet TAKE 1 TABLET BY MOUTH EVERY MORNING 30 MINUTES BEFORE BREAKFAST    potassium chloride CR 20 mEq ER tablet 20 mEq, oral, Daily    ramipril (ALTACE) 5 mg, oral, Daily    warfarin (Coumadin) 5 mg tablet Take 1 tablet by mouth daily or as directed        Review of Systems  Constitutional  No fatigue, no fevers, no chills, unintentional weight loss,   HEENT:    No headaches, no dizziness, eye exams current (sees in few months)  Cardiovascular:  No chest pain, no palpitations, ANGUIANO shortness of breath with exertion (one flight of stairs or one city block)  Respiratory:  Occasional cough, No shortness of breath at rest    Physical Exam  /62   Pulse 66   Wt 79.8 kg (176 lb)   SpO2 96%   BMI 31.18 kg/m²   General: Well-appearing  M in no acute distress, well nourished, well hydrated  Head:  Normocephalic, atraumatic  Eyes:  Anicteric sclera, pupils equal  Ears:       TMs intact  Oral:     Not examined due to pandemic)  Neck:   Supple, no cervical/supraclavicular adenopathy, no thyromegaly or nodules appreciated on exam  Cor:      Regular rate, normal S1, S2, no murmurs appreciated, no S3, no S4   Lungs:   Clear to auscultation b/l, no wheezes, no rhonchi, no crackles, no accessory respiratory muscle use  Extremities:      Tender over left anterolateral ankle dorsum    Assessment/Plan   Problem List Items Addressed This Visit       Benign essential hypertension - Primary     BP well controlled  Refilled cardura         Relevant Medications    doxazosin (Cardura) 4 mg tablet    Diabetes mellitus (CMS/[facility])     HBA1C of 7 in January   Too soon to do, Plan HBA1C next visit         Pleural effusion     Appreciate pulmonary evaluation/recommendations  Moderate right pleural effusion on 2/13/2024 xray  Breathing much improved on Zaroxolyn         Chronic anticoagulation     INR was 2.8  He'll repeat in 3-4 weeks         Left ankle pain     May be due to arthritis  Discussed trial of  topical Voltaren   Not having pain today              Trice June, DO

## 2024-02-29 PROBLEM — M25.572 LEFT ANKLE PAIN: Status: ACTIVE | Noted: 2024-02-29

## 2024-02-29 NOTE — ASSESSMENT & PLAN NOTE
Appreciate pulmonary evaluation/recommendations  Moderate right pleural effusion on 2/13/2024 xray  Breathing much improved on Zaroxolyn

## 2024-03-19 ENCOUNTER — LAB (OUTPATIENT)
Dept: LAB | Facility: LAB | Age: 89
End: 2024-03-19
Payer: MEDICARE

## 2024-03-19 DIAGNOSIS — Z79.01 CHRONIC ANTICOAGULATION: ICD-10-CM

## 2024-03-19 DIAGNOSIS — I48.91 ATRIAL FIBRILLATION, UNSPECIFIED TYPE (MULTI): ICD-10-CM

## 2024-03-19 LAB
INR PPP: 2.5 (ref 0.9–1.1)
PROTHROMBIN TIME: 28.4 SECONDS (ref 9.8–12.8)

## 2024-03-19 PROCEDURE — 36415 COLL VENOUS BLD VENIPUNCTURE: CPT

## 2024-03-19 PROCEDURE — 85610 PROTHROMBIN TIME: CPT

## 2024-04-09 DIAGNOSIS — I10 BENIGN ESSENTIAL HYPERTENSION: ICD-10-CM

## 2024-04-11 RX ORDER — POTASSIUM CHLORIDE 20 MEQ/1
TABLET, EXTENDED RELEASE ORAL
Qty: 90 TABLET | Refills: 0 | Status: SHIPPED | OUTPATIENT
Start: 2024-04-11

## 2024-04-16 ENCOUNTER — LAB (OUTPATIENT)
Dept: LAB | Facility: LAB | Age: 89
End: 2024-04-16
Payer: MEDICARE

## 2024-04-16 DIAGNOSIS — I48.91 ATRIAL FIBRILLATION, UNSPECIFIED TYPE (MULTI): ICD-10-CM

## 2024-04-16 DIAGNOSIS — Z79.01 CHRONIC ANTICOAGULATION: ICD-10-CM

## 2024-04-16 LAB
INR PPP: 2.5 (ref 0.9–1.1)
PROTHROMBIN TIME: 29 SECONDS (ref 9.8–12.8)

## 2024-04-16 PROCEDURE — 36415 COLL VENOUS BLD VENIPUNCTURE: CPT

## 2024-04-16 PROCEDURE — 85610 PROTHROMBIN TIME: CPT

## 2024-04-29 DIAGNOSIS — E11.9 TYPE 2 DIABETES MELLITUS WITHOUT COMPLICATION, WITHOUT LONG-TERM CURRENT USE OF INSULIN (MULTI): ICD-10-CM

## 2024-04-29 DIAGNOSIS — I48.91 ATRIAL FIBRILLATION, UNSPECIFIED TYPE (MULTI): ICD-10-CM

## 2024-04-30 RX ORDER — METFORMIN HYDROCHLORIDE 500 MG/1
TABLET, EXTENDED RELEASE ORAL
Qty: 90 TABLET | Refills: 1 | Status: SHIPPED | OUTPATIENT
Start: 2024-04-30

## 2024-04-30 RX ORDER — WARFARIN SODIUM 5 MG/1
TABLET ORAL
Qty: 90 TABLET | Refills: 1 | Status: SHIPPED | OUTPATIENT
Start: 2024-04-30

## 2024-05-13 ENCOUNTER — OFFICE VISIT (OUTPATIENT)
Dept: PULMONOLOGY | Facility: CLINIC | Age: 89
End: 2024-05-13
Payer: MEDICARE

## 2024-05-13 ENCOUNTER — HOSPITAL ENCOUNTER (OUTPATIENT)
Dept: RADIOLOGY | Facility: CLINIC | Age: 89
Discharge: HOME | End: 2024-05-13
Payer: MEDICARE

## 2024-05-13 VITALS
RESPIRATION RATE: 16 BRPM | DIASTOLIC BLOOD PRESSURE: 65 MMHG | WEIGHT: 176 LBS | TEMPERATURE: 97.7 F | OXYGEN SATURATION: 97 % | SYSTOLIC BLOOD PRESSURE: 104 MMHG | BODY MASS INDEX: 31.18 KG/M2 | HEART RATE: 50 BPM

## 2024-05-13 DIAGNOSIS — J90 PLEURAL EFFUSION: ICD-10-CM

## 2024-05-13 DIAGNOSIS — R06.09 DYSPNEA ON EXERTION: Primary | ICD-10-CM

## 2024-05-13 DIAGNOSIS — R06.09 DYSPNEA ON EXERTION: ICD-10-CM

## 2024-05-13 PROCEDURE — 1036F TOBACCO NON-USER: CPT | Performed by: INTERNAL MEDICINE

## 2024-05-13 PROCEDURE — 71046 X-RAY EXAM CHEST 2 VIEWS: CPT

## 2024-05-13 PROCEDURE — 1160F RVW MEDS BY RX/DR IN RCRD: CPT | Performed by: INTERNAL MEDICINE

## 2024-05-13 PROCEDURE — 99214 OFFICE O/P EST MOD 30 MIN: CPT | Performed by: INTERNAL MEDICINE

## 2024-05-13 PROCEDURE — 1159F MED LIST DOCD IN RCRD: CPT | Performed by: INTERNAL MEDICINE

## 2024-05-13 PROCEDURE — 3074F SYST BP LT 130 MM HG: CPT | Performed by: INTERNAL MEDICINE

## 2024-05-13 PROCEDURE — 3078F DIAST BP <80 MM HG: CPT | Performed by: INTERNAL MEDICINE

## 2024-05-13 PROCEDURE — 71046 X-RAY EXAM CHEST 2 VIEWS: CPT | Performed by: RADIOLOGY

## 2024-05-13 ASSESSMENT — ENCOUNTER SYMPTOMS
AGITATION: 0
SINUS PRESSURE: 0
WEAKNESS: 0
FACIAL SWELLING: 0
SINUS PAIN: 0
DIFFICULTY URINATING: 0
STRIDOR: 0
HEMATURIA: 0
ARTHRALGIAS: 0
PALPITATIONS: 0
SHORTNESS OF BREATH: 0
BACK PAIN: 1
CONSTIPATION: 0
FEVER: 0
LIGHT-HEADEDNESS: 0
NUMBNESS: 0
ABDOMINAL PAIN: 0
DIZZINESS: 0
APNEA: 0
WHEEZING: 0
FATIGUE: 0
SPEECH DIFFICULTY: 0
RHINORRHEA: 0
TREMORS: 0
UNEXPECTED WEIGHT CHANGE: 0
ADENOPATHY: 0
CHOKING: 0
EYE REDNESS: 0
DYSURIA: 0
NERVOUS/ANXIOUS: 0
JOINT SWELLING: 0
ABDOMINAL DISTENTION: 0
EYE DISCHARGE: 0
FREQUENCY: 0
HEADACHES: 0
COUGH: 0
SLEEP DISTURBANCE: 0
NAUSEA: 0
BRUISES/BLEEDS EASILY: 0

## 2024-05-13 NOTE — PROGRESS NOTES
@PULMONARY FOLLOW-UP@       PROBLEM: effusion     ASSESSMENT:  The patient is a 89-year-old with atrial fibrillation elevation of his right hemidiaphragm potentially had to paralysis.  He has a chronic pleural effusion on the right likely related to chronic congestive heart failure.  The fluid has been controlled on furosemide and Jardiance.  He does appear stable at the present time and his chest x-ray reveals stability of the effusion coupled with elevation of the right hemidiaphragm    PLAN:  The patient will continue present treatment with diuretics and return in 6 months      HISTORY OF PRESENT ILLNESS:    The patient is an 89-year-old with atrial fibrillation elevation of his right hemidiaphragm which based on the records has been present for an extended timeframe and potentially this relates to diaphragmatic paralysis. There is no history of trauma, degenerative arthritis of the neck or other specific causes for this condition. In addition, he was determined to have at increasing right pleural effusion accumulation on the right with 1+ edema on examination. He has had edema for extended timeframe and he does have atrial fibrillation with underlying CAD. I wondered whether he could have a component of heart failure accounting for these findings. The effusion was not marked and probably the most reasonable thing was to empirically treat him with an increased diuretic dose and then see if his effusion resolves. The echocardiogram from September 16, 2021 revealed left ventricular ejection fraction of 55 to 60% with moderately increased left ventricular septal thickness and a severely dilated left atrium. The right ventricle was mildly reduced in function but normal in size. He was seen in follow-up on October 6, 2021 and was doing well from a pulmonary perspective on his diuretics. There was no increasing pleural effusion with elevation of the right hemidiaphragm and he was stable from a pulmonary perspective. It  was felt that potentially his cough was potentiated by Ramipril or reflux. He appears compensated from a pulm perspective and the treatment was to continue as is. When last seen on June 6, 2022 he appeared stable from a pulmonary perspective. We will do chest x-ray showing chronic elevation right hemidiaphragm with blunting of the right costophrenic angle.        The patient was last seen on June 19, 2023 and at that time it was described that the patient over the last several months he has noted some increasing shortness of breath. He saw Dr. Ruggiero last week his diuretic dose was increased to 2 a day. He feels that that made a difference. He has no chest pains or pressures. He has some chronic swelling but it has not changed too much. He is not wheezing or bringing up any phlegm.  The chest x-ray was stable at that point in time.     When seen last on October 26, 2023 it was  outlined that the patient reports that over the last 2 to 3 months he has had increasing shortness of breath particular when he bends over.  He has no chest pains or pressures PND orthopnea.  He has chronic swelling of his legs and wears support hose and is on 40 mg of furosemide a day.  He notes that after he starts doing something he feels little bit better and he takes slow deep breaths and feels less short of breath.        His chest x-ray outline what looks to be increasing pleural fluid accumulation.  He saw cardiology and had been placed on metolazone 2.5 mg once a week.  Jardiance was to be considered.      the CT scan from January 9, 2024 outlined a moderate size right effusion with compressive atelectasis.  There is a small to moderate pleural effusion.  There was upper abdominal free fluid and stranding in the mesentery.  He did have elevation of the right hemidiaphragm.     On 2-24-24 it was noted that the patient has been taking 40 mg of Lasix a day over the last month with a booster of metolazone once a week with extra potassium.   He feels that his breathing is about 95% improved.  His wife states that he is also breathing better at night.  He has more exercise capacity although he still gets short of breath.  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  He was to continue on the same medication.      The patient reports that his breathing has been stable.  He has been on his metolazone once a week coupled with furosemide daily.  He has no chest pains or pressures.  He does me produce mucus at times.    Generally he feels pretty decent and is limited with respect to activities because of his back.    No Known Allergies         Current Outpatient Medications:     acetaminophen (Tylenol) 500 mg tablet, Take 1 tablet (500 mg) by mouth every 8 hours if needed for moderate pain (4 - 6). Repeat every 4 to 6 hours as needed, Disp: , Rfl:     aspirin 81 mg EC tablet, Take 1 tablet (81 mg) by mouth once daily., Disp: , Rfl:     atorvastatin (Lipitor) 80 mg tablet, Take 1 tablet (80 mg) by mouth once daily., Disp: 90 tablet, Rfl: 3    cholecalciferol (Vitamin D-3) 50 mcg (2,000 unit) capsule, Take 1 capsule (50 mcg) by mouth once daily., Disp: , Rfl:     cyclobenzaprine (Flexeril) 5 mg tablet, Take 1 tablet (5 mg) by mouth as needed at bedtime for muscle spasms (or Back Pain)., Disp: , Rfl:     dilTIAZem ER (Tiazac) 360 mg 24 hr capsule, TAKE 1 CAPSULE BY MOUTH EVERY NIGHT AT BEDTIME, Disp: 90 capsule, Rfl: 3    doxazosin (Cardura) 4 mg tablet, Take 1.5 tablets (6 mg) by mouth once daily., Disp: 135 tablet, Rfl: 1    furosemide (Lasix) 20 mg tablet, Take 2 tablets (40 mg) by mouth once daily., Disp: 180 tablet, Rfl: 1    glucosamine/chondr nelson A sod (OSTEO BI-FLEX ORAL), Take 2 tablets by mouth 1 (one) time each day. Joint Esdras, Disp: , Rfl:     loratadine (Claritin Liqui-Gel) 10 mg capsule, Take 1 capsule by mouth if needed each day., Disp: , Rfl:      metFORMIN  mg 24 hr tablet, TAKE 1 TABLET(500 MG) BY MOUTH EVERY DAY, Disp: 90 tablet, Rfl: 1    metOLazone (Zaroxolyn) 2.5 mg tablet, TAKE 1 TABLET(2.5 MG) BY MOUTH 1 TIME EVERY WEEK, Disp: 12 tablet, Rfl: 0    metoprolol tartrate (Lopressor) 25 mg tablet, TAKE 1 TABLET BY MOUTH TWICE DAILY, Disp: 180 tablet, Rfl: 0    multivitamin/iron/folic acid (CENTRUM ORAL), Take 1 tablet by mouth 1 (one) time each day. Centrum Adult Oral Tablet, Disp: , Rfl:     nitroglycerin (Nitrostat) 0.4 mg SL tablet, Place 1 tablet (0.4 mg) under the tongue every 5 minutes if needed for chest pain., Disp: , Rfl:     pantoprazole (ProtoNix) 40 mg EC tablet, TAKE 1 TABLET BY MOUTH EVERY MORNING 30 MINUTES BEFORE BREAKFAST, Disp: 90 tablet, Rfl: 3    potassium chloride CR 20 mEq ER tablet, TAKE 1 TABLET(20 MEQ) BY MOUTH EVERY DAY, Disp: 90 tablet, Rfl: 0    ramipril (Altace) 5 mg capsule, Take 1 capsule (5 mg) by mouth once daily., Disp: 90 capsule, Rfl: 3    warfarin (Coumadin) 5 mg tablet, TAKE 1 TABLET BY MOUTH DAILY OR AS DIRECTED, Disp: 90 tablet, Rfl: 1          Review of Systems   Constitutional:  Negative for fatigue, fever and unexpected weight change.   HENT:  Negative for congestion, facial swelling, nosebleeds, postnasal drip, rhinorrhea, sinus pressure and sinus pain.    Eyes:  Negative for discharge, redness and visual disturbance.   Respiratory:  Negative for apnea, cough, choking, shortness of breath, wheezing and stridor.    Cardiovascular:  Negative for chest pain, palpitations and leg swelling.   Gastrointestinal:  Negative for abdominal distention, abdominal pain, constipation and nausea.   Endocrine: Negative for cold intolerance and heat intolerance.   Genitourinary:  Negative for difficulty urinating, dysuria, frequency and hematuria.   Musculoskeletal:  Positive for back pain. Negative for arthralgias, gait problem and joint swelling.   Allergic/Immunologic: Negative for environmental allergies, food allergies  and immunocompromised state.   Neurological:  Negative for dizziness, tremors, syncope, speech difficulty, weakness, light-headedness, numbness and headaches.   Hematological:  Negative for adenopathy. Does not bruise/bleed easily.   Psychiatric/Behavioral:  Negative for agitation, behavioral problems and sleep disturbance. The patient is not nervous/anxious.         Vitals:    05/13/24 0957   BP: 104/65   Pulse: 50   Resp: 16   Temp: 36.5 °C (97.7 °F)   SpO2: 97%        Physical Exam  Vitals reviewed.   Constitutional:       Appearance: Normal appearance.   HENT:      Head: Normocephalic and atraumatic.   Eyes:      Extraocular Movements: Extraocular movements intact.   Cardiovascular:      Rate and Rhythm: Normal rate and regular rhythm.      Heart sounds: No murmur heard.     No friction rub. No gallop.   Pulmonary:      Effort: Pulmonary effort is normal. No respiratory distress.      Breath sounds: Normal breath sounds. No stridor. No wheezing, rhonchi or rales.      Comments: Dullness and decreased breath sounds at the right base  Chest:      Chest wall: No tenderness.   Abdominal:      General: Abdomen is flat. There is no distension.      Palpations: Abdomen is soft. There is no mass.      Tenderness: There is no abdominal tenderness.   Musculoskeletal:         General: Normal range of motion.      Cervical back: Normal range of motion.      Right lower leg: No edema.      Left lower leg: No edema.   Skin:     General: Skin is warm and dry.   Neurological:      Mental Status: He is alert and oriented to person, place, and time.   Psychiatric:         Mood and Affect: Mood normal.         Behavior: Behavior normal.

## 2024-05-20 DIAGNOSIS — I10 BENIGN ESSENTIAL HYPERTENSION: ICD-10-CM

## 2024-05-22 ENCOUNTER — LAB (OUTPATIENT)
Dept: LAB | Facility: LAB | Age: 89
End: 2024-05-22
Payer: MEDICARE

## 2024-05-22 DIAGNOSIS — I48.91 ATRIAL FIBRILLATION, UNSPECIFIED TYPE (MULTI): ICD-10-CM

## 2024-05-22 DIAGNOSIS — Z79.01 CHRONIC ANTICOAGULATION: ICD-10-CM

## 2024-05-22 LAB
INR PPP: 3.3 (ref 0.9–1.1)
PROTHROMBIN TIME: 37.8 SECONDS (ref 9.8–12.8)

## 2024-05-22 PROCEDURE — 85610 PROTHROMBIN TIME: CPT

## 2024-05-22 PROCEDURE — 36415 COLL VENOUS BLD VENIPUNCTURE: CPT

## 2024-05-22 RX ORDER — RAMIPRIL 5 MG/1
5 CAPSULE ORAL DAILY
Qty: 90 CAPSULE | Refills: 0 | Status: SHIPPED | OUTPATIENT
Start: 2024-05-22

## 2024-05-23 ENCOUNTER — OFFICE VISIT (OUTPATIENT)
Dept: CARDIOLOGY | Facility: CLINIC | Age: 89
End: 2024-05-23
Payer: MEDICARE

## 2024-05-23 VITALS
HEIGHT: 63 IN | BODY MASS INDEX: 31.36 KG/M2 | DIASTOLIC BLOOD PRESSURE: 74 MMHG | OXYGEN SATURATION: 96 % | WEIGHT: 177 LBS | HEART RATE: 75 BPM | SYSTOLIC BLOOD PRESSURE: 132 MMHG

## 2024-05-23 DIAGNOSIS — I48.91 ATRIAL FIBRILLATION, UNSPECIFIED TYPE (MULTI): Primary | ICD-10-CM

## 2024-05-23 DIAGNOSIS — I10 BENIGN ESSENTIAL HYPERTENSION: ICD-10-CM

## 2024-05-23 DIAGNOSIS — I50.20 SYSTOLIC CONGESTIVE HEART FAILURE, UNSPECIFIED HF CHRONICITY (MULTI): ICD-10-CM

## 2024-05-23 DIAGNOSIS — E78.5 HYPERLIPIDEMIA, UNSPECIFIED HYPERLIPIDEMIA TYPE: ICD-10-CM

## 2024-05-23 DIAGNOSIS — I25.10 3-VESSEL CORONARY ARTERY DISEASE: ICD-10-CM

## 2024-05-23 PROBLEM — I50.9 CONGESTIVE HEART FAILURE (MULTI): Status: ACTIVE | Noted: 2024-05-23

## 2024-05-23 PROCEDURE — 1159F MED LIST DOCD IN RCRD: CPT | Performed by: INTERNAL MEDICINE

## 2024-05-23 PROCEDURE — 3075F SYST BP GE 130 - 139MM HG: CPT | Performed by: INTERNAL MEDICINE

## 2024-05-23 PROCEDURE — 1126F AMNT PAIN NOTED NONE PRSNT: CPT | Performed by: INTERNAL MEDICINE

## 2024-05-23 PROCEDURE — 1160F RVW MEDS BY RX/DR IN RCRD: CPT | Performed by: INTERNAL MEDICINE

## 2024-05-23 PROCEDURE — 99214 OFFICE O/P EST MOD 30 MIN: CPT | Performed by: INTERNAL MEDICINE

## 2024-05-23 PROCEDURE — 3078F DIAST BP <80 MM HG: CPT | Performed by: INTERNAL MEDICINE

## 2024-05-23 PROCEDURE — 1036F TOBACCO NON-USER: CPT | Performed by: INTERNAL MEDICINE

## 2024-05-23 PROCEDURE — 93005 ELECTROCARDIOGRAM TRACING: CPT | Performed by: INTERNAL MEDICINE

## 2024-05-23 PROCEDURE — 93010 ELECTROCARDIOGRAM REPORT: CPT | Performed by: INTERNAL MEDICINE

## 2024-05-23 ASSESSMENT — COLUMBIA-SUICIDE SEVERITY RATING SCALE - C-SSRS
6. HAVE YOU EVER DONE ANYTHING, STARTED TO DO ANYTHING, OR PREPARED TO DO ANYTHING TO END YOUR LIFE?: NO
1. IN THE PAST MONTH, HAVE YOU WISHED YOU WERE DEAD OR WISHED YOU COULD GO TO SLEEP AND NOT WAKE UP?: NO
2. HAVE YOU ACTUALLY HAD ANY THOUGHTS OF KILLING YOURSELF?: NO

## 2024-05-23 ASSESSMENT — PAIN SCALES - GENERAL: PAINLEVEL: 0-NO PAIN

## 2024-05-23 ASSESSMENT — PATIENT HEALTH QUESTIONNAIRE - PHQ9
2. FEELING DOWN, DEPRESSED OR HOPELESS: NOT AT ALL
1. LITTLE INTEREST OR PLEASURE IN DOING THINGS: NOT AT ALL
SUM OF ALL RESPONSES TO PHQ9 QUESTIONS 1 AND 2: 0

## 2024-05-23 ASSESSMENT — ENCOUNTER SYMPTOMS
OCCASIONAL FEELINGS OF UNSTEADINESS: 1
DEPRESSION: 0
LOSS OF SENSATION IN FEET: 0

## 2024-05-23 NOTE — PROGRESS NOTES
Primary Care Physician: Trice June DO  Date of Visit: 2024  4:40 PM EDT  Location of visit: [facility] [address] [facility] / Summary:   Ariel Rodríguez is a 89 y.o. male  with coronary disease, hypertension, hyperlipidemia, diabetes, atrial fibrillation and congestive heart failure  In 2012 he had cardiac catheterization that showed diffuse severe coronary artery disease and medical therapy was recommended  On 14 he had chest pain and shortness of breath and was admitted to the hospital with an acute myocardial infarction. He had cardiac catheterization that showed diffuse severe disease and a new 95% lesion in the distal left anterior descending artery. He had stents put in the proximal and distal left anterior descending artery  One brother  at 94, one sister is 97 years old and one brother  at age of 89 and one sister  at 83, 2 others are younger  He had an echocardiogram on September 15, 2021 that showed normal left ventricular systolic function with moderately increased septal thickness and severely dilated left atrium  Blood test on 2023 was all normal except for a glucose of 216 hemoglobin A1c 6.8 hematocrit 37.3%.  Cholesterol was 93 with HDL 39 LDL 33 triglycerides 105.    He can only walk 1 block and he will get short of breath.   Echocardiogram on 2023 showed normal left ventricular function and severe left atrial enlargement   He continues to do well with no chest pain and can only walk 1 block limited by his back  He is known to have problem with his right hemidiaphragm followed by pulmonary service  On examination his lungs are clear with decreased breath sounds at the right lower base, the liver is not enlarged and he has +2 leg edema  Left test on 2024 was all normal except for a sodium of 155 glucose 212 hematocrit 55.8% hemoglobin A1c 87 INR 3.3.  Cholesterol was 84 HDL 38.6 triglycerides 98 is  On metolazone 2.5 mg once a week  for half an hour by his Lasix dose and on that day take an additional dose of potassium he had excellent diuresis with excellent results and was no longer short of breath and his leg edema improved and lost a few pounds  At present he is doing well with no chest pain and no shortness of breath and is active but again is limited by his back and should do stretching exercises  We had a long discussion about diet and losing weight. Losing weight will help to control his diabetes. I did recommend 1500 poornima per day and no eating between meals.  I suggested starting Jardiance as an additional therapy but the patient at the present is opposed to taking any new medicines since he is doing so well  He needs better control of his diabetes and will discuss this with his primary doctor  He would continue his current medical therapy and followup in 6 months.   Follow-up with pulmonary service        Past Medical History:  Past Medical History:   Diagnosis Date    Acute myocardial infarction, unspecified (Multi) 11/09/2022    Acute myocardial infarction    Acute upper respiratory infection, unspecified 04/10/2018    Viral URI    Atherosclerotic heart disease of native coronary artery with unspecified angina pectoris (CMS-[facility]) 11/21/2022    Athscl heart disease of native cor art w unsp ang pctrs    Benign neoplasm of colon, unspecified 08/26/2016    Colonic adenoma    Encounter for screening for malignant neoplasm of colon 07/24/2014    Colon cancer screening    Hyperglycemia 01/26/2023    Hyponatremia 01/26/2023    Obesity, unspecified 10/28/2020    Class 1 obesity with body mass index (BMI) of 32.0 to 32.9 in adult    Obesity, unspecified 11/09/2022    Obesity, Class I, BMI 30.0-34.9 (see actual BMI)    Olecranon bursitis, unspecified elbow 02/19/2020    Olecranon bursitis    Osteoarthritis of knee, unspecified 10/03/2017    Osteoarthrosis of knee    Other fecal abnormalities 08/26/2016    Guaiac positive stools    Personal  history of other diseases of the circulatory system 08/17/2016    History of angina pectoris    Personal history of other diseases of the musculoskeletal system and connective tissue     History of arthritis    Personal history of other diseases of the nervous system and sense organs     History of cataract    Personal history of other diseases of the respiratory system 04/10/2018    History of acute bronchitis    Personal history of other drug therapy 10/01/2019    History of influenza vaccination    Personal history of other endocrine, nutritional and metabolic disease 12/26/2018    History of vitamin D deficiency    Personal history of other endocrine, nutritional and metabolic disease 02/26/2015    History of hypokalemia    Personal history of other specified conditions     History of heartburn    Personal history of thrombophlebitis 12/29/2015    History of deep vein thrombophlebitis of lower extremity    Pure hypercholesterolemia, unspecified 01/02/2019    Pure hypercholesterolemia, unspecified    Unspecified atrial fibrillation (Multi) 01/03/2023    Atrial fibrillation    Unspecified injury of head, sequela 11/29/2019    CHI (closed head injury), sequela        Past Surgical History:  Past Surgical History:   Procedure Laterality Date    APPENDECTOMY  01/20/2014    Appendectomy    CATARACT EXTRACTION  01/20/2014    Cataract Surgery    TONSILLECTOMY  01/20/2014    Tonsillectomy          Social History:   reports that he has never smoked. He has never used smokeless tobacco. He reports current alcohol use. He reports that he does not use drugs.     Family History:  family history includes Brain Tumor in his sister; Breast cancer in his niece; Coronary artery disease in his mother; Heart failure in his brother, mother, and sister; Lung cancer in his brother and another family member; Malignant Neoplasm in his brother; Mild Cognitive Impairment in his brother; Myocardial Infarction in his sister; Prostate cancer  in his brother; Stomach cancer in his father.      Allergies:  No Known Allergies    Outpatient Medications:  Current Outpatient Medications   Medication Instructions    acetaminophen (Tylenol) 500 mg tablet 1 tablet, oral, Every 8 hours PRN, Repeat every 4 to 6 hours as needed    aspirin 81 mg, oral, Daily    atorvastatin (LIPITOR) 80 mg, oral, Daily    cholecalciferol (Vitamin D-3) 50 mcg (2,000 unit) capsule 1 capsule, oral, Daily    cyclobenzaprine (Flexeril) 5 mg tablet 1 tablet, oral, Nightly PRN    dilTIAZem ER (TIAZAC) 360 mg, oral, Nightly    doxazosin (CARDURA) 6 mg, oral, Daily    furosemide (LASIX) 40 mg, oral, Daily    glucosamine/chondr nelson A sod (OSTEO BI-FLEX ORAL) 2 tablets, oral, Daily, Joint Sheild    loratadine (Claritin Liqui-Gel) 10 mg capsule 1 capsule, oral, Daily PRN    metFORMIN  mg 24 hr tablet TAKE 1 TABLET(500 MG) BY MOUTH EVERY DAY    metOLazone (Zaroxolyn) 2.5 mg tablet TAKE 1 TABLET(2.5 MG) BY MOUTH 1 TIME EVERY WEEK    metoprolol tartrate (LOPRESSOR) 25 mg, oral, 2 times daily    multivitamin/iron/folic acid (CENTRUM ORAL) 1 tablet, oral, Daily, Centrum Adult Oral Tablet    nitroglycerin (Nitrostat) 0.4 mg SL tablet 1 tablet, sublingual, Every 5 min PRN    pantoprazole (ProtoNix) 40 mg EC tablet TAKE 1 TABLET BY MOUTH EVERY MORNING 30 MINUTES BEFORE BREAKFAST    potassium chloride CR 20 mEq ER tablet TAKE 1 TABLET(20 MEQ) BY MOUTH EVERY DAY    ramipril (ALTACE) 5 mg, oral, Daily    warfarin (Coumadin) 5 mg tablet TAKE 1 TABLET BY MOUTH DAILY OR AS DIRECTED       Physical Exam:  Constitutional: alert and in no acute distress.  Eyes: no erythema, swelling or discharge from the eye.  Neck: neck is supple, symmetric, trachea midline, no masses, and no thyromegaly.  Pulmonary: no increased work of breathing or signs of respiratory distress and lungs clear to auscultation.  Cardiovascular: carotid pulses 2+ bilaterally with no bruit, JVP was normal, no thrills, regular rhythm, normal  "S1 and S2, no murmurs, pedal pulses 2+ bilaterally and no pitting edema.  Abdomen: Abdomen non-tender, no masses, and no hepatomegaly.  Skin: Skin warm and dry, normal skin turgor  Neurologic: non- focal neurologic examination.  Psychiatric: judgement and insight is normal and oriented to person place and time.      Vitals:    05/23/24 1620   BP: 132/74   BP Location: Left arm   Patient Position: Sitting   BP Cuff Size: Adult   Pulse: 75   SpO2: 96%   Weight: 80.3 kg (177 lb)   Height: 1.6 m (5' 3\")     Wt Readings from Last 5 Encounters:   05/23/24 80.3 kg (177 lb)   05/13/24 79.8 kg (176 lb)   02/26/24 79.8 kg (176 lb)   02/13/24 80.3 kg (177 lb)   12/28/23 83.1 kg (183 lb 2 oz)     Body mass index is 31.35 kg/m².      Last Labs:  CMP:  Recent Labs     01/16/24  1119 10/17/23  1121 05/31/23  1119 03/21/23  1115 11/09/22  1117   * 140 138 139 139   K 4.2 4.2 4.2 4.3 4.5   CL 98 103 104 103 101   CO2 27 27 28 27 27   ANIONGAP 14 14 10 13 16   BUN 32* 21 23 24* 22   CREATININE 1.08 0.94 1.00 0.90 0.99   EGFR 66 78  --   --   --    GLUCOSE 242* 217* 166* 214* 133*     Recent Labs     01/16/24  1119 10/17/23  1121 05/31/23  1119 03/21/23  1115 11/09/22  1117   ALBUMIN 3.8 3.8 4.0 3.8 4.1   ALKPHOS 79 77 88 72 72   ALT 14 14 14 14 14   AST 19 17 19 18 18   BILITOT 0.8 0.9 0.8 0.7 1.0     CBC:  Recent Labs     01/16/24  1119 10/17/23  1121 03/21/23  1115 11/09/22  1117 07/06/22  0835   WBC 5.8 5.6 6.1 6.1 6.1   HGB 11.8* 11.5* 11.7* 12.5* 12.9*   HCT 35.8* 37.3* 37.9* 38.9* 38.9*    161 156 168 169   MCV 91 97 94 95 91     COAG:   Recent Labs     05/22/24  1014 04/16/24  1026 03/19/24  1101 02/20/24  1051 01/16/24  1119   INR 3.3* 2.5* 2.5* 2.8* 2.6*     HEME/ENDO:  Recent Labs     01/16/24  1119 10/17/23  1121 05/31/23  1119 11/09/22  1117 07/06/22  0835 03/02/22  1014 11/22/21  1200   IRONSAT  --   --   --   --  18*  --  22*   HGBA1C 7.0* 6.8* 6.9* 6.4* 7.2*   < > 7.1*    < > = values in this interval not " "displayed.      CARDIAC: No results for input(s): \"LDH\", \"CKMB\", \"TROPHS\", \"BNP\" in the last 61643 hours.    No lab exists for component: \"CK\", \"CKMBP\"  Recent Labs     01/16/24  1119 11/09/22  1117 07/06/22  0835 06/22/21  0815   CHOL 84 93 103 93   LDLF  --  33 38 30   HDL 38.6 39.0* 42.4 38.9*   TRIG 98 105 113 120       Last Cardiology Tests:  ECG:    Electrocardiogram shows atrial fibrillation with a heart rate of 62/min and is otherwise normal    Echo:  Echo Results:  No results found for this or any previous visit from the past 3650 days.       Cath:      Stress Test:  Stress Results:  No results found for this or any previous visit from the past 365 days.         Cardiac Imaging:  XR chest 2 views  Narrative: Interpreted By:  Tima Hickman,   STUDY:  XR CHEST 2 VIEWS;  5/13/2024 10:27 am      INDICATION:  Signs/Symptoms:CHF  effusion.      COMPARISON:  Selected chest radiographs as far back as June 6, 2022 including  February 13, 2014, and January 9, 2024 chest CT      ACCESSION NUMBER(S):  [id number]      ORDERING CLINICIAN:  GILBERT SANDY      FINDINGS:  LIFE SUPPORT DEVICES:  None      CARDIOMEDIASTINAL SILHOUETTE:  Similar appearance cardiomediastinal silhouette which is partially  obscured on the right side.      LUNGS:  Similar blunting of the right costophrenic angle related to effusion  and adjacent atelectasis. Similar vascular prominence without barbi  edema. No focal consolidative pneumonia.      ABDOMEN:  No remarkable upper abdominal findings.      BONES:  No acute osseous changes.      Impression: 1.  Similar abnormal radiographic appearance of chest with persistent  right-sided effusion and adjacent atelectasis and prominent vessels  without barbi edema.          Signed by: Tima Hickman 5/14/2024 2:38 PM  Dictation workstation:   [id number]          Orders:  No orders of the defined types were placed in this encounter.     Followup Appts:  Future Appointments   Date Time Provider " Department Center   5/23/2024  4:40 PM Jovanny Ruggiero MD [id number] [facility]   5/29/2024 11:00 AM Trice June DO [id number] [facility]   11/13/2024 10:00 AM Stevenson Tolentino MD [facility] [id number] [facility]           ____________________________________________________________  Jovanny Ruggiero MD  [facility]

## 2024-05-24 LAB
ATRIAL RATE: 83 BPM
Q ONSET: 221 MS
QRS COUNT: 10 BEATS
QRS DURATION: 86 MS
QT INTERVAL: 382 MS
QTC CALCULATION(BAZETT): 387 MS
QTC FREDERICIA: 386 MS
R AXIS: 79 DEGREES
T AXIS: 58 DEGREES
T OFFSET: 412 MS
VENTRICULAR RATE: 62 BPM

## 2024-05-29 ENCOUNTER — OFFICE VISIT (OUTPATIENT)
Dept: PRIMARY CARE | Facility: CLINIC | Age: 89
End: 2024-05-29
Payer: MEDICARE

## 2024-05-29 ENCOUNTER — LAB (OUTPATIENT)
Dept: LAB | Facility: LAB | Age: 89
End: 2024-05-29
Payer: MEDICARE

## 2024-05-29 VITALS
BODY MASS INDEX: 29.94 KG/M2 | WEIGHT: 169 LBS | OXYGEN SATURATION: 94 % | SYSTOLIC BLOOD PRESSURE: 100 MMHG | HEART RATE: 56 BPM | DIASTOLIC BLOOD PRESSURE: 48 MMHG

## 2024-05-29 DIAGNOSIS — R79.1 SUPRATHERAPEUTIC INR: ICD-10-CM

## 2024-05-29 DIAGNOSIS — I48.91 ATRIAL FIBRILLATION, UNSPECIFIED TYPE (MULTI): ICD-10-CM

## 2024-05-29 DIAGNOSIS — I50.20 SYSTOLIC CONGESTIVE HEART FAILURE, UNSPECIFIED HF CHRONICITY (MULTI): ICD-10-CM

## 2024-05-29 DIAGNOSIS — E11.9 TYPE 2 DIABETES MELLITUS WITHOUT COMPLICATION, WITHOUT LONG-TERM CURRENT USE OF INSULIN (MULTI): ICD-10-CM

## 2024-05-29 DIAGNOSIS — I10 BENIGN ESSENTIAL HYPERTENSION: ICD-10-CM

## 2024-05-29 DIAGNOSIS — I25.10 3-VESSEL CORONARY ARTERY DISEASE: ICD-10-CM

## 2024-05-29 DIAGNOSIS — I10 BENIGN ESSENTIAL HYPERTENSION: Primary | ICD-10-CM

## 2024-05-29 DIAGNOSIS — Z00.00 HEALTH CARE MAINTENANCE: ICD-10-CM

## 2024-05-29 LAB
ALBUMIN SERPL BCP-MCNC: 3.8 G/DL (ref 3.4–5)
ALP SERPL-CCNC: 86 U/L (ref 33–136)
ALT SERPL W P-5'-P-CCNC: 14 U/L (ref 10–52)
ANION GAP SERPL CALC-SCNC: 15 MMOL/L (ref 10–20)
AST SERPL W P-5'-P-CCNC: 22 U/L (ref 9–39)
BASOPHILS # BLD AUTO: 0.03 X10*3/UL (ref 0–0.1)
BASOPHILS NFR BLD AUTO: 0.5 %
BILIRUB SERPL-MCNC: 0.7 MG/DL (ref 0–1.2)
BUN SERPL-MCNC: 30 MG/DL (ref 6–23)
CALCIUM SERPL-MCNC: 9 MG/DL (ref 8.6–10.6)
CHLORIDE SERPL-SCNC: 100 MMOL/L (ref 98–107)
CO2 SERPL-SCNC: 27 MMOL/L (ref 21–32)
CREAT SERPL-MCNC: 1.21 MG/DL (ref 0.5–1.3)
EGFRCR SERPLBLD CKD-EPI 2021: 57 ML/MIN/1.73M*2
EOSINOPHIL # BLD AUTO: 0.32 X10*3/UL (ref 0–0.4)
EOSINOPHIL NFR BLD AUTO: 5.1 %
ERYTHROCYTE [DISTWIDTH] IN BLOOD BY AUTOMATED COUNT: 15.7 % (ref 11.5–14.5)
EST. AVERAGE GLUCOSE BLD GHB EST-MCNC: 148 MG/DL
GLUCOSE SERPL-MCNC: 169 MG/DL (ref 74–99)
HBA1C MFR BLD: 6.8 %
HCT VFR BLD AUTO: 35.2 % (ref 41–52)
HGB BLD-MCNC: 11.3 G/DL (ref 13.5–17.5)
IMM GRANULOCYTES # BLD AUTO: 0.03 X10*3/UL (ref 0–0.5)
IMM GRANULOCYTES NFR BLD AUTO: 0.5 % (ref 0–0.9)
INR PPP: 2.4 (ref 0.9–1.1)
LYMPHOCYTES # BLD AUTO: 1.15 X10*3/UL (ref 0.8–3)
LYMPHOCYTES NFR BLD AUTO: 18.2 %
MCH RBC QN AUTO: 29.9 PG (ref 26–34)
MCHC RBC AUTO-ENTMCNC: 32.1 G/DL (ref 32–36)
MCV RBC AUTO: 93 FL (ref 80–100)
MONOCYTES # BLD AUTO: 0.91 X10*3/UL (ref 0.05–0.8)
MONOCYTES NFR BLD AUTO: 14.4 %
NEUTROPHILS # BLD AUTO: 3.89 X10*3/UL (ref 1.6–5.5)
NEUTROPHILS NFR BLD AUTO: 61.3 %
NRBC BLD-RTO: 0 /100 WBCS (ref 0–0)
PLATELET # BLD AUTO: 162 X10*3/UL (ref 150–450)
POTASSIUM SERPL-SCNC: 4.6 MMOL/L (ref 3.5–5.3)
PROT SERPL-MCNC: 7 G/DL (ref 6.4–8.2)
PROTHROMBIN TIME: 27.4 SECONDS (ref 9.8–12.8)
RBC # BLD AUTO: 3.78 X10*6/UL (ref 4.5–5.9)
SODIUM SERPL-SCNC: 137 MMOL/L (ref 136–145)
WBC # BLD AUTO: 6.3 X10*3/UL (ref 4.4–11.3)

## 2024-05-29 PROCEDURE — 83036 HEMOGLOBIN GLYCOSYLATED A1C: CPT

## 2024-05-29 PROCEDURE — 1160F RVW MEDS BY RX/DR IN RCRD: CPT | Performed by: SPECIALIST

## 2024-05-29 PROCEDURE — 80053 COMPREHEN METABOLIC PANEL: CPT

## 2024-05-29 PROCEDURE — 1159F MED LIST DOCD IN RCRD: CPT | Performed by: SPECIALIST

## 2024-05-29 PROCEDURE — 99214 OFFICE O/P EST MOD 30 MIN: CPT | Performed by: SPECIALIST

## 2024-05-29 PROCEDURE — 85610 PROTHROMBIN TIME: CPT

## 2024-05-29 PROCEDURE — 3074F SYST BP LT 130 MM HG: CPT | Performed by: SPECIALIST

## 2024-05-29 PROCEDURE — 3078F DIAST BP <80 MM HG: CPT | Performed by: SPECIALIST

## 2024-05-29 PROCEDURE — 85025 COMPLETE CBC W/AUTO DIFF WBC: CPT

## 2024-05-29 PROCEDURE — 36415 COLL VENOUS BLD VENIPUNCTURE: CPT

## 2024-05-29 NOTE — PROGRESS NOTES
Subjective   Patient ID: Ariel Rodríguez is a 89 y.o. male who presents for No chief complaint on file..  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[employer])    Has not needed sl nitroglycerin    Had spider bite used benadryl cream and it resolved    No Known Allergies   Current Outpatient Medications   Medication Instructions    acetaminophen (Tylenol) 500 mg tablet 1 tablet, oral, Every 8 hours PRN, Repeat every 4 to 6 hours as needed    aspirin 81 mg, oral, Daily    atorvastatin (LIPITOR) 80 mg, oral, Daily    cholecalciferol (Vitamin D-3) 50 mcg (2,000 unit) capsule 1 capsule, oral, Daily    cyclobenzaprine (Flexeril) 5 mg tablet 1 tablet, oral, Nightly PRN    dilTIAZem ER (TIAZAC) 360 mg, oral, Nightly    doxazosin (CARDURA) 6 mg, oral, Daily    furosemide (LASIX) 40 mg, oral, Daily    glucosamine/chondr nelson A sod (OSTEO BI-FLEX ORAL) 2 tablets, oral, Daily, Joint Sheild    loratadine (Claritin Liqui-Gel) 10 mg capsule 1 capsule, oral, Daily PRN    metFORMIN  mg 24 hr tablet TAKE 1 TABLET(500 MG) BY MOUTH EVERY DAY    metOLazone (Zaroxolyn) 2.5 mg tablet TAKE 1 TABLET(2.5 MG) BY MOUTH 1 TIME EVERY WEEK    metoprolol tartrate (LOPRESSOR) 25 mg, oral, 2 times daily    multivitamin/iron/folic acid (CENTRUM ORAL) 1 tablet, oral, Daily, Centrum Adult Oral Tablet    nitroglycerin (Nitrostat) 0.4 mg SL tablet 1 tablet, sublingual, Every 5 min PRN    pantoprazole (ProtoNix) 40 mg EC tablet TAKE 1 TABLET BY MOUTH EVERY MORNING 30 MINUTES BEFORE BREAKFAST    potassium chloride CR 20 mEq ER tablet TAKE 1 TABLET(20 MEQ) BY MOUTH EVERY DAY    ramipril (ALTACE) 5 mg, oral, Daily    warfarin (Coumadin) 5 mg tablet TAKE 1 TABLET BY MOUTH DAILY  OR AS DIRECTED        Review of Systems  Constitutional  No fatigue, no fevers, no chills, no unintentional weight loss,   HEENT:  No headaches, no dizziness, occasionally stiff neck changed pillows and that helped, eye exams due (to schedule), no hearing loss  Cardiovascular:  No chest pain, no palpitations, some shortness of breath with exertion (one flight of stairs) but goes away right away,  Respiratory:  Chronic bronchial cough, no hemoptysis, no wheezing, No shortness of breath at rest  GI:  No dysphagia, no odynophagia, no reflux, no abdominal pain, no nausea, no vomiting, no changes in bowel habits, occasional constipation, no bright red blood per rectum, no melena  :  No urinary frequency, no dysuria, no urine incontinence, nocturia x 1  MSK:  No falls, shoulders joint pain (dog has dislocated it in past)   Neuro:  No tremors, no extremity weakness, no changes in sensation    Physical Exam  BP (!) 100/48   Pulse 56   Wt 76.7 kg (169 lb)   SpO2 94%   BMI 29.94 kg/m²   100/48 right arm seated  General:    Well-appearing M in no acute distress, well nourished, well hydrated  Head:  Normocephalic, atraumatic  Skin:          Warm dry   Eyes:  Anicteric sclera, pupils equal,   Oral:      Not examined due to pandemic  Neck:   Supple,   Cor:      Regular rate, normal S1, S2, no murmurs appreciated, no S3, no S4   Lungs:   Clear to auscultation b/l, no wheezes, no rhonchi, no crackles, no accessory respiratory muscle use  Abd:          Soft, nontender, no guarding  Ext:            One plus bilateral lower extremity edema (usually wears compression hose)    Assessment/Plan   Problem List Items Addressed This Visit       3-vessel coronary artery disease     Management per Cardiology  On Statin, ASA Betablocker         Relevant Orders    Comprehensive Metabolic Panel (Completed)    CBC and Auto Differential (Completed)    Atrial fibrillation (Multi)     Rate controlled on Diltiazem and Metoprolol  Anticoagulated  on warfarin  Management per Cardiology         Relevant Orders    CBC and Auto Differential (Completed)    Benign essential hypertension - Primary     BP very well controlled  Continue current medication         Relevant Orders    Comprehensive Metabolic Panel (Completed)    Diabetes mellitus (Multi)     HBA1C was 7 on 1/16/2024  Discussed SGLT2 inhibitor but had normal renal function last GFR  Feels he is doing well on current regimen and is not inclined to change  Reconsider if renal function declines         Relevant Orders    Comprehensive Metabolic Panel (Completed)    Hemoglobin A1C (Completed)    Albumin , Urine Random    Health care maintenance     Recommend repeat Covid vaccine this spring  MAW due in November         Congestive heart failure (Multi)     Stable on current medication  On Lasix 40 mg daily and Zaroxolyn 2.5 mg weekly  On ACE and Beta blocker  Management per Cardiology         Supratherapeutic INR     On Wafarin  INR 3.3 on 5/22, will get repeat INR today         Relevant Orders    Protime-INR (Completed)        Trice June DO

## 2024-05-30 DIAGNOSIS — N28.9 FUNCTION KIDNEY DECREASED: Primary | ICD-10-CM

## 2024-06-03 DIAGNOSIS — I10 BENIGN ESSENTIAL HYPERTENSION: ICD-10-CM

## 2024-06-04 RX ORDER — DOXAZOSIN 4 MG/1
6 TABLET ORAL DAILY
Qty: 135 TABLET | Refills: 1 | Status: SHIPPED | OUTPATIENT
Start: 2024-06-04

## 2024-06-05 RX ORDER — DOXAZOSIN 4 MG/1
15 TABLET ORAL DAILY
Qty: 135 TABLET | Refills: 1 | OUTPATIENT
Start: 2024-06-05

## 2024-06-11 PROBLEM — R79.1 SUPRATHERAPEUTIC INR: Status: ACTIVE | Noted: 2024-06-11

## 2024-06-11 NOTE — ASSESSMENT & PLAN NOTE
Stable on current medication  On Lasix 40 mg daily and Zaroxolyn 2.5 mg weekly  On ACE and Beta blocker  Management per Cardiology

## 2024-06-11 NOTE — ASSESSMENT & PLAN NOTE
HBA1C was 7 on 1/16/2024  Discussed SGLT2 inhibitor but had normal renal function last GFR  Feels he is doing well on current regimen and is not inclined to change  Reconsider if renal function declines

## 2024-06-11 NOTE — ASSESSMENT & PLAN NOTE
Rate controlled on Diltiazem and Metoprolol  Anticoagulated on warfarin  Management per Cardiology

## 2024-06-19 ENCOUNTER — LAB (OUTPATIENT)
Dept: LAB | Facility: LAB | Age: 89
End: 2024-06-19
Payer: MEDICARE

## 2024-06-19 DIAGNOSIS — Z79.01 CHRONIC ANTICOAGULATION: Primary | ICD-10-CM

## 2024-06-19 DIAGNOSIS — Z79.01 CHRONIC ANTICOAGULATION: ICD-10-CM

## 2024-06-19 LAB
INR PPP: 2.4 (ref 0.9–1.1)
PROTHROMBIN TIME: 27 SECONDS (ref 9.8–12.8)

## 2024-06-19 PROCEDURE — 85610 PROTHROMBIN TIME: CPT

## 2024-06-19 PROCEDURE — 36415 COLL VENOUS BLD VENIPUNCTURE: CPT

## 2024-06-24 DIAGNOSIS — I10 BENIGN ESSENTIAL HYPERTENSION: ICD-10-CM

## 2024-06-24 DIAGNOSIS — K21.9 GASTROESOPHAGEAL REFLUX DISEASE WITHOUT ESOPHAGITIS: ICD-10-CM

## 2024-06-25 RX ORDER — POTASSIUM CHLORIDE 20 MEQ/1
20 TABLET, EXTENDED RELEASE ORAL DAILY
Qty: 90 TABLET | Refills: 1 | Status: SHIPPED | OUTPATIENT
Start: 2024-06-25

## 2024-06-25 RX ORDER — PANTOPRAZOLE SODIUM 40 MG/1
TABLET, DELAYED RELEASE ORAL
Qty: 90 TABLET | Refills: 1 | Status: SHIPPED | OUTPATIENT
Start: 2024-06-25

## 2024-07-15 DIAGNOSIS — I10 BENIGN ESSENTIAL HYPERTENSION: ICD-10-CM

## 2024-07-16 RX ORDER — DILTIAZEM HYDROCHLORIDE 360 MG/1
360 CAPSULE, EXTENDED RELEASE ORAL NIGHTLY
Qty: 90 CAPSULE | Refills: 2 | Status: SHIPPED | OUTPATIENT
Start: 2024-07-16

## 2024-07-17 ENCOUNTER — LAB (OUTPATIENT)
Dept: LAB | Facility: LAB | Age: 89
End: 2024-07-17
Payer: MEDICARE

## 2024-07-17 DIAGNOSIS — Z79.01 CHRONIC ANTICOAGULATION: ICD-10-CM

## 2024-07-17 LAB
INR PPP: 3.3 (ref 0.9–1.1)
PROTHROMBIN TIME: 37.6 SECONDS (ref 9.8–12.8)

## 2024-07-17 PROCEDURE — 85610 PROTHROMBIN TIME: CPT

## 2024-07-17 PROCEDURE — 36415 COLL VENOUS BLD VENIPUNCTURE: CPT

## 2024-07-20 DIAGNOSIS — R79.1 SUPRATHERAPEUTIC INR: Primary | ICD-10-CM

## 2024-07-29 ENCOUNTER — LAB (OUTPATIENT)
Dept: LAB | Facility: LAB | Age: 89
End: 2024-07-29
Payer: MEDICARE

## 2024-07-29 DIAGNOSIS — R79.1 SUPRATHERAPEUTIC INR: ICD-10-CM

## 2024-07-29 LAB
INR PPP: 2.2 (ref 0.9–1.1)
PROTHROMBIN TIME: 25.3 SECONDS (ref 9.8–12.8)

## 2024-07-29 PROCEDURE — 85610 PROTHROMBIN TIME: CPT

## 2024-07-29 PROCEDURE — 36415 COLL VENOUS BLD VENIPUNCTURE: CPT

## 2024-08-06 ENCOUNTER — TELEPHONE (OUTPATIENT)
Dept: CARDIOLOGY | Facility: HOSPITAL | Age: 89
End: 2024-08-06

## 2024-08-19 DIAGNOSIS — I10 BENIGN ESSENTIAL HYPERTENSION: ICD-10-CM

## 2024-08-19 RX ORDER — RAMIPRIL 5 MG/1
5 CAPSULE ORAL DAILY
Qty: 90 CAPSULE | Refills: 2 | Status: SHIPPED | OUTPATIENT
Start: 2024-08-19

## 2024-08-28 ENCOUNTER — LAB (OUTPATIENT)
Dept: LAB | Facility: LAB | Age: 89
End: 2024-08-28
Payer: MEDICARE

## 2024-08-28 DIAGNOSIS — Z79.01 CHRONIC ANTICOAGULATION: Primary | ICD-10-CM

## 2024-08-28 DIAGNOSIS — I48.91 ATRIAL FIBRILLATION, UNSPECIFIED TYPE (MULTI): ICD-10-CM

## 2024-08-28 DIAGNOSIS — N28.9 FUNCTION KIDNEY DECREASED: ICD-10-CM

## 2024-08-28 LAB
ANION GAP SERPL CALC-SCNC: 16 MMOL/L (ref 10–20)
BUN SERPL-MCNC: 28 MG/DL (ref 6–23)
CALCIUM SERPL-MCNC: 9.1 MG/DL (ref 8.6–10.6)
CHLORIDE SERPL-SCNC: 98 MMOL/L (ref 98–107)
CO2 SERPL-SCNC: 27 MMOL/L (ref 21–32)
CREAT SERPL-MCNC: 1.06 MG/DL (ref 0.5–1.3)
EGFRCR SERPLBLD CKD-EPI 2021: 67 ML/MIN/1.73M*2
GLUCOSE SERPL-MCNC: 233 MG/DL (ref 74–99)
POTASSIUM SERPL-SCNC: 4.1 MMOL/L (ref 3.5–5.3)
SODIUM SERPL-SCNC: 137 MMOL/L (ref 136–145)

## 2024-08-28 PROCEDURE — 80048 BASIC METABOLIC PNL TOTAL CA: CPT

## 2024-08-28 PROCEDURE — 36415 COLL VENOUS BLD VENIPUNCTURE: CPT

## 2024-09-03 DIAGNOSIS — I10 BENIGN ESSENTIAL HYPERTENSION: ICD-10-CM

## 2024-09-04 RX ORDER — DOXAZOSIN 4 MG/1
6 TABLET ORAL DAILY
Qty: 135 TABLET | Refills: 0 | Status: SHIPPED | OUTPATIENT
Start: 2024-09-04

## 2024-09-05 ENCOUNTER — TELEPHONE (OUTPATIENT)
Dept: PRIMARY CARE | Facility: CLINIC | Age: 89
End: 2024-09-05
Payer: MEDICARE

## 2024-09-23 DIAGNOSIS — R06.09 DYSPNEA ON EXERTION: ICD-10-CM

## 2024-09-23 RX ORDER — FUROSEMIDE 20 MG/1
40 TABLET ORAL DAILY
Qty: 180 TABLET | Refills: 1 | Status: SHIPPED | OUTPATIENT
Start: 2024-09-23

## 2024-09-25 ENCOUNTER — LAB (OUTPATIENT)
Dept: LAB | Facility: LAB | Age: 89
End: 2024-09-25
Payer: MEDICARE

## 2024-09-25 DIAGNOSIS — Z79.01 CHRONIC ANTICOAGULATION: ICD-10-CM

## 2024-09-25 DIAGNOSIS — I48.91 ATRIAL FIBRILLATION, UNSPECIFIED TYPE (MULTI): ICD-10-CM

## 2024-09-25 LAB
INR PPP: 2.4 (ref 0.9–1.1)
PROTHROMBIN TIME: 27.4 SECONDS (ref 9.8–12.8)

## 2024-09-25 PROCEDURE — 36415 COLL VENOUS BLD VENIPUNCTURE: CPT

## 2024-09-25 PROCEDURE — 85610 PROTHROMBIN TIME: CPT

## 2024-10-22 ENCOUNTER — LAB (OUTPATIENT)
Dept: LAB | Facility: LAB | Age: 89
End: 2024-10-22
Payer: MEDICARE

## 2024-10-22 DIAGNOSIS — I48.91 ATRIAL FIBRILLATION, UNSPECIFIED TYPE (MULTI): ICD-10-CM

## 2024-10-22 DIAGNOSIS — Z79.01 CHRONIC ANTICOAGULATION: ICD-10-CM

## 2024-10-22 LAB
INR PPP: 2 (ref 0.9–1.1)
PROTHROMBIN TIME: 22.9 SECONDS (ref 9.8–12.8)

## 2024-10-22 PROCEDURE — 36415 COLL VENOUS BLD VENIPUNCTURE: CPT

## 2024-10-22 PROCEDURE — 85610 PROTHROMBIN TIME: CPT

## 2024-10-28 DIAGNOSIS — E11.9 TYPE 2 DIABETES MELLITUS WITHOUT COMPLICATION, WITHOUT LONG-TERM CURRENT USE OF INSULIN (MULTI): ICD-10-CM

## 2024-10-29 RX ORDER — METFORMIN HYDROCHLORIDE 500 MG/1
500 TABLET, EXTENDED RELEASE ORAL DAILY
Qty: 90 TABLET | Refills: 0 | Status: SHIPPED | OUTPATIENT
Start: 2024-10-29

## 2024-11-06 ENCOUNTER — APPOINTMENT (OUTPATIENT)
Dept: PRIMARY CARE | Facility: CLINIC | Age: 89
End: 2024-11-06
Payer: MEDICARE

## 2024-11-06 ENCOUNTER — LAB (OUTPATIENT)
Dept: LAB | Facility: LAB | Age: 89
End: 2024-11-06
Payer: MEDICARE

## 2024-11-06 VITALS
SYSTOLIC BLOOD PRESSURE: 102 MMHG | HEART RATE: 43 BPM | HEIGHT: 63 IN | DIASTOLIC BLOOD PRESSURE: 58 MMHG | BODY MASS INDEX: 28.88 KG/M2 | WEIGHT: 163 LBS | OXYGEN SATURATION: 92 %

## 2024-11-06 DIAGNOSIS — I10 BENIGN ESSENTIAL HYPERTENSION: ICD-10-CM

## 2024-11-06 DIAGNOSIS — I25.10 3-VESSEL CORONARY ARTERY DISEASE: ICD-10-CM

## 2024-11-06 DIAGNOSIS — E78.5 HYPERLIPIDEMIA, UNSPECIFIED HYPERLIPIDEMIA TYPE: ICD-10-CM

## 2024-11-06 DIAGNOSIS — E11.9 TYPE 2 DIABETES MELLITUS WITHOUT COMPLICATION, WITHOUT LONG-TERM CURRENT USE OF INSULIN (MULTI): Primary | ICD-10-CM

## 2024-11-06 DIAGNOSIS — E11.9 TYPE 2 DIABETES MELLITUS WITHOUT COMPLICATION, WITHOUT LONG-TERM CURRENT USE OF INSULIN (MULTI): ICD-10-CM

## 2024-11-06 LAB
ALBUMIN SERPL BCP-MCNC: 3.9 G/DL (ref 3.4–5)
ALP SERPL-CCNC: 100 U/L (ref 33–136)
ALT SERPL W P-5'-P-CCNC: 14 U/L (ref 10–52)
ANION GAP SERPL CALC-SCNC: 12 MMOL/L (ref 10–20)
AST SERPL W P-5'-P-CCNC: 19 U/L (ref 9–39)
BILIRUB SERPL-MCNC: 0.8 MG/DL (ref 0–1.2)
BUN SERPL-MCNC: 27 MG/DL (ref 6–23)
CALCIUM SERPL-MCNC: 9.3 MG/DL (ref 8.6–10.6)
CHLORIDE SERPL-SCNC: 98 MMOL/L (ref 98–107)
CHOLEST SERPL-MCNC: 97 MG/DL (ref 0–199)
CHOLESTEROL/HDL RATIO: 2.3
CO2 SERPL-SCNC: 32 MMOL/L (ref 21–32)
CREAT SERPL-MCNC: 1.11 MG/DL (ref 0.5–1.3)
CREAT UR-MCNC: 29.8 MG/DL (ref 20–370)
EGFRCR SERPLBLD CKD-EPI 2021: 63 ML/MIN/1.73M*2
ERYTHROCYTE [DISTWIDTH] IN BLOOD BY AUTOMATED COUNT: 15.1 % (ref 11.5–14.5)
EST. AVERAGE GLUCOSE BLD GHB EST-MCNC: 160 MG/DL
GLUCOSE SERPL-MCNC: 124 MG/DL (ref 74–99)
HBA1C MFR BLD: 7.2 %
HCT VFR BLD AUTO: 33.6 % (ref 41–52)
HDLC SERPL-MCNC: 41.4 MG/DL
HGB BLD-MCNC: 10.9 G/DL (ref 13.5–17.5)
LDLC SERPL CALC-MCNC: 33 MG/DL
MCH RBC QN AUTO: 29 PG (ref 26–34)
MCHC RBC AUTO-ENTMCNC: 32.4 G/DL (ref 32–36)
MCV RBC AUTO: 89 FL (ref 80–100)
MICROALBUMIN UR-MCNC: <7 MG/L
MICROALBUMIN/CREAT UR: NORMAL MG/G{CREAT}
NON HDL CHOLESTEROL: 56 MG/DL (ref 0–149)
NRBC BLD-RTO: 0 /100 WBCS (ref 0–0)
PLATELET # BLD AUTO: 179 X10*3/UL (ref 150–450)
POTASSIUM SERPL-SCNC: 3.8 MMOL/L (ref 3.5–5.3)
PROT SERPL-MCNC: 7.5 G/DL (ref 6.4–8.2)
RBC # BLD AUTO: 3.76 X10*6/UL (ref 4.5–5.9)
SODIUM SERPL-SCNC: 138 MMOL/L (ref 136–145)
TRIGL SERPL-MCNC: 115 MG/DL (ref 0–149)
VLDL: 23 MG/DL (ref 0–40)
WBC # BLD AUTO: 6.4 X10*3/UL (ref 4.4–11.3)

## 2024-11-06 PROCEDURE — 1160F RVW MEDS BY RX/DR IN RCRD: CPT | Performed by: SPECIALIST

## 2024-11-06 PROCEDURE — 82570 ASSAY OF URINE CREATININE: CPT

## 2024-11-06 PROCEDURE — 83036 HEMOGLOBIN GLYCOSYLATED A1C: CPT

## 2024-11-06 PROCEDURE — 80053 COMPREHEN METABOLIC PANEL: CPT

## 2024-11-06 PROCEDURE — 82043 UR ALBUMIN QUANTITATIVE: CPT

## 2024-11-06 PROCEDURE — 1159F MED LIST DOCD IN RCRD: CPT | Performed by: SPECIALIST

## 2024-11-06 PROCEDURE — 99214 OFFICE O/P EST MOD 30 MIN: CPT | Performed by: SPECIALIST

## 2024-11-06 PROCEDURE — 36415 COLL VENOUS BLD VENIPUNCTURE: CPT

## 2024-11-06 PROCEDURE — 80061 LIPID PANEL: CPT

## 2024-11-06 PROCEDURE — 3078F DIAST BP <80 MM HG: CPT | Performed by: SPECIALIST

## 2024-11-06 PROCEDURE — 85027 COMPLETE CBC AUTOMATED: CPT

## 2024-11-06 PROCEDURE — 3074F SYST BP LT 130 MM HG: CPT | Performed by: SPECIALIST

## 2024-11-06 NOTE — PROGRESS NOTES
Subjective   Patient ID: Ariel Rodríguez is a 89 y.o. male who presents for Follow-up.  HPI    88 yo male Pmhx sig for Afib (on Warfarin), CAD, Rheumatic Fever, DM, Hyperlipidemia, BPH, Colon Polyps and Spinal stenosis here today for follow-up accompanied by his wife     Fell on   Was trying to put on pants and one of his feet got twisted, didn't hit head.  Sustained laceration to right elbow and forearm.      15 yo dog  in     Takes Zaroxyln on , now noticing more SOB come Friday  Sees Cardio next month   Sees Pulm next week  Weight stable    No Known Allergies   Current Outpatient Medications   Medication Instructions    acetaminophen (Tylenol) 500 mg tablet 1 tablet, Every 8 hours PRN    aspirin 81 mg, Daily    atorvastatin (LIPITOR) 80 mg, oral, Daily    cholecalciferol (Vitamin D-3) 50 mcg (2,000 unit) capsule 1 capsule, Daily    cyclobenzaprine (Flexeril) 5 mg tablet 1 tablet, Nightly PRN    dilTIAZem ER (TIAZAC) 360 mg, oral, Nightly    doxazosin (CARDURA) 6 mg, oral, Daily    furosemide (LASIX) 40 mg, oral, Daily    glucosamine/chondr nelson A sod (OSTEO BI-FLEX ORAL) 2 tablets, Daily    loratadine (Claritin Liqui-Gel) 10 mg capsule 1 capsule, Daily PRN    metFORMIN XR (GLUCOPHAGE-XR) 500 mg, oral, Daily    metOLazone (Zaroxolyn) 2.5 mg tablet TAKE 1 TABLET(2.5 MG) BY MOUTH 1 TIME EVERY WEEK    metoprolol tartrate (LOPRESSOR) 25 mg, oral, 2 times daily    multivitamin/iron/folic acid (CENTRUM ORAL) 1 tablet, Daily    nitroglycerin (Nitrostat) 0.4 mg SL tablet 1 tablet, Every 5 min PRN    pantoprazole (ProtoNix) 40 mg EC tablet TAKE 1 TABLET BY MOUTH EVERY MORNING 30 MINUTES BEFORE BREAKFAST    potassium chloride CR 20 mEq ER tablet 20 mEq, oral, Daily    ramipril (ALTACE) 5 mg, oral, Daily    warfarin (Coumadin) 5 mg tablet TAKE 1 TABLET BY MOUTH DAILY OR AS DIRECTED        Review of Systems  Constitutional  No fatigue but dozes if he watches TV, no fevers, no chills, no unintentional weight  loss,   HEENT:  Occasional headaches from neck, using special pillow, no dizziness,  Cardiovascular:  No chest pain, no palpitations, some shortness of breath with exertion (one flight of stairs lasts a couple of seconds and is not new)   Respiratory:  Positive productive clear mucous cough, no hemoptysis, no wheezing, No shortness of breath at rest  GI:  No dysphagia, no odynophagia, no reflux, no abdominal pain, no nausea, no vomiting, no changes in bowel habits, no bright red blood per rectum, no melena  :  No urinary frequency, no dysuria, no urine incontinence  MSK:  One falls, no joint pain, no joint swelling always lower extremity ankle edema (on dilt)  Neuro:  No tremors, no extremity weakness, carpal tunnel left    Physical Exam  /58   Pulse (!) 47   Wt 73.9 kg (163 lb)   SpO2 95%   BMI 28.87 kg/m²   102/58  General: Well-appearing  M in no acute distress, well nourished, well hydrated  Head:  Normocephalic, atraumatic  Eyes:  Anicteric sclera, pupils equal  Oral:     Not examined due to pandemic  Neck:   Supple,  Cor:      Regular rate, normal S1, S2, no murmurs appreciated, no S3, no S4   Lungs:   Clear to auscultation b/l, no wheezes, no rhonchi, no crackles, no accessory respiratory muscle use  Ext:                  Trace lower extremity edema (usually wears compression hose but not on days he sees doctors       Assessment/Plan     DM2  -A1C 6.8 in May   -eye exams current (saw last week)  -Labs ordered CMP A1C Urine Alb/Cr    3VCAD, Hyperlipidemia  -On Statin and BB  -Management per Cardiology    Htn  -BP well controlled on current medication  -Takes metoprolol 1 tab in the morning half tab night     Health care maintenance  -Previously received annual influenza vaccine  -Previously received Covid vaccine  -Prior RSV   -Prior Tdap 12/2023  -Prior PCV 13 7/2015 PPSV23 in 6/2018, recommend Prevnar 21 or 20  -Recommend Shingrix vaccines    Was to have physical today but only scheduled for /fup  -- will add in into December for MACIE (office will call to schedule)      Trice June, DO

## 2024-11-08 ENCOUNTER — TELEPHONE (OUTPATIENT)
Dept: PRIMARY CARE | Facility: CLINIC | Age: 89
End: 2024-11-08

## 2024-11-11 DIAGNOSIS — I25.10 3-VESSEL CORONARY ARTERY DISEASE: ICD-10-CM

## 2024-11-12 RX ORDER — ATORVASTATIN CALCIUM 80 MG/1
80 TABLET, FILM COATED ORAL DAILY
Qty: 90 TABLET | Refills: 3 | Status: SHIPPED | OUTPATIENT
Start: 2024-11-12

## 2024-11-13 ENCOUNTER — HOSPITAL ENCOUNTER (OUTPATIENT)
Dept: RADIOLOGY | Facility: CLINIC | Age: 89
Discharge: HOME | End: 2024-11-13
Payer: MEDICARE

## 2024-11-13 ENCOUNTER — OFFICE VISIT (OUTPATIENT)
Dept: PULMONOLOGY | Facility: CLINIC | Age: 89
End: 2024-11-13
Payer: MEDICARE

## 2024-11-13 VITALS
SYSTOLIC BLOOD PRESSURE: 102 MMHG | TEMPERATURE: 97.5 F | WEIGHT: 178 LBS | OXYGEN SATURATION: 96 % | RESPIRATION RATE: 18 BRPM | HEART RATE: 60 BPM | DIASTOLIC BLOOD PRESSURE: 59 MMHG | BODY MASS INDEX: 31.53 KG/M2

## 2024-11-13 DIAGNOSIS — J90 PLEURAL EFFUSION: ICD-10-CM

## 2024-11-13 DIAGNOSIS — J90 PLEURAL EFFUSION: Primary | ICD-10-CM

## 2024-11-13 PROCEDURE — 71046 X-RAY EXAM CHEST 2 VIEWS: CPT | Performed by: STUDENT IN AN ORGANIZED HEALTH CARE EDUCATION/TRAINING PROGRAM

## 2024-11-13 PROCEDURE — 99214 OFFICE O/P EST MOD 30 MIN: CPT | Mod: 25 | Performed by: INTERNAL MEDICINE

## 2024-11-13 PROCEDURE — 99214 OFFICE O/P EST MOD 30 MIN: CPT | Performed by: INTERNAL MEDICINE

## 2024-11-13 PROCEDURE — 1160F RVW MEDS BY RX/DR IN RCRD: CPT | Performed by: INTERNAL MEDICINE

## 2024-11-13 PROCEDURE — 71046 X-RAY EXAM CHEST 2 VIEWS: CPT

## 2024-11-13 PROCEDURE — 1036F TOBACCO NON-USER: CPT | Performed by: INTERNAL MEDICINE

## 2024-11-13 PROCEDURE — 1159F MED LIST DOCD IN RCRD: CPT | Performed by: INTERNAL MEDICINE

## 2024-11-13 PROCEDURE — 3078F DIAST BP <80 MM HG: CPT | Performed by: INTERNAL MEDICINE

## 2024-11-13 PROCEDURE — 3074F SYST BP LT 130 MM HG: CPT | Performed by: INTERNAL MEDICINE

## 2024-11-13 ASSESSMENT — ENCOUNTER SYMPTOMS
SPEECH DIFFICULTY: 0
DIFFICULTY URINATING: 0
SHORTNESS OF BREATH: 1
CHOKING: 0
LIGHT-HEADEDNESS: 0
EYE REDNESS: 0
STRIDOR: 0
NERVOUS/ANXIOUS: 0
HEADACHES: 0
BRUISES/BLEEDS EASILY: 0
ABDOMINAL DISTENTION: 0
AGITATION: 0
NAUSEA: 0
ABDOMINAL PAIN: 0
HEMATURIA: 0
FREQUENCY: 0
WHEEZING: 0
COUGH: 0
WEAKNESS: 0
DIZZINESS: 0
DYSURIA: 0
FATIGUE: 0
APNEA: 0
ARTHRALGIAS: 0
SINUS PAIN: 0
ADENOPATHY: 0
FACIAL SWELLING: 0
TREMORS: 0
EYE DISCHARGE: 0
CONSTIPATION: 0
UNEXPECTED WEIGHT CHANGE: 0
SLEEP DISTURBANCE: 0
NUMBNESS: 0
FEVER: 0
SINUS PRESSURE: 0
JOINT SWELLING: 0
PALPITATIONS: 0
RHINORRHEA: 0

## 2024-11-13 NOTE — PROGRESS NOTES
@PULMONARY FOLLOW-UP@       PROBLEM: Pleural effusion, diaphragmatic dysfunction    ASSESSMENT:  The patient is an 89-year-old with atrial fibrillation and elevation of the right hemidiaphragm which potentially relates to paralysis.  He also has a likely stable pleural effusion.  His edema on examination is 1+ and he seems fairly well-controlled on the furosemide and Jardiance.  His chest x-ray reveals stability of the pleural effusion and elevation of the right hemidiaphragm.  He seems to be doing quite well clinically.    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 There is no history of trauma, degenerative arthritis of the neck or other specific causes for this condition. In addition, he was determined to have at increasing right pleural effusion accumulation on the right with 1+ edema on examination. He has had edema for extended timeframe and he does have atrial fibrillation with underlying CAD. I wondered whether he could have a component of heart failure accounting for these findings. The effusion was not marked and probably the most reasonable thing was to empirically treat him with an increased diuretic dose and then see if his effusion resolves. The echocardiogram from September 16, 2021 revealed left ventricular ejection fraction of 55 to 60% with moderately increased left ventricular septal thickness and a severely dilated left atrium. The right ventricle was mildly reduced in function but normal in size. He was seen in follow-up on October 6, 2021 and was doing well from a pulmonary perspective on his diuretics. There was no increasing pleural effusion with elevation of the right hemidiaphragm and he was stable from a  pulmonary perspective. It was felt that potentially his cough was potentiated by Ramipril or reflux. He appears compensated from a pulm perspective and the treatment was to continue as is. When last seen on June 6, 2022 he appeared stable from a pulmonary perspective. We will do chest x-ray showing chronic elevation right hemidiaphragm with blunting of the right costophrenic angle.        The patient was last seen on June 19, 2023 and at that time it was described that the patient over the last several months he has noted some increasing shortness of breath. He saw Dr. Ruggiero last week his diuretic dose was increased to 2 a day. He feels that that made a difference. He has no chest pains or pressures. He has some chronic swelling but it has not changed too much. He is not wheezing or bringing up any phlegm.  The chest x-ray was stable at that point in time.     When seen last on October 26, 2023 it was  outlined that the patient reports that over the last 2 to 3 months he has had increasing shortness of breath particular when he bends over.  He has no chest pains or pressures PND orthopnea.  He has chronic swelling of his legs and wears support hose and is on 40 mg of furosemide a day.  He notes that after he starts doing something he feels little bit better and he takes slow deep breaths and feels less short of breath.        His chest x-ray outline what looks to be increasing pleural fluid accumulation.  He saw cardiology and had been placed on metolazone 2.5 mg once a week.  Jardiance was to be considered.      the CT scan from January 9, 2024 outlined a moderate size right effusion with compressive atelectasis.  There is a small to moderate pleural effusion.  There was upper abdominal free fluid and stranding in the mesentery.  He did have elevation of the right hemidiaphragm.     On 2-24-24 it was noted that the patient has been taking 40 mg of Lasix a day over the last month with a booster of metolazone once a  week with extra potassium.  He feels that his breathing is about 95% improved.  His wife states that he is also breathing better at night.  He has more exercise capacity although he still gets short of breath.  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  He was to continue on the same medication.        On May 13, 2024 was reported that his breathing has been stable.  He has been on his metolazone once a week coupled with furosemide daily.  He has no chest pains or pressures.  He does me produce mucus at times.    Generally he feels pretty decent and is limited with respect to activities because of his back.  At that point in time his chest x-ray was stable.  He was continued on his diuretic.     Currently, the patient reports that his breathing is about the same as his his edema.  He he notes no increasing dyspnea on exertion.  He has no chest pains or pressures.  He sleeps on 1 pillow at night.    No Known Allergies         Current Outpatient Medications:     acetaminophen (Tylenol) 500 mg tablet, Take 1 tablet (500 mg) by mouth every 8 hours if needed for moderate pain (4 - 6). Repeat every 4 to 6 hours as needed, Disp: , Rfl:     aspirin 81 mg EC tablet, Take 1 tablet (81 mg) by mouth once daily., Disp: , Rfl:     atorvastatin (Lipitor) 80 mg tablet, TAKE 1 TABLET(80 MG) BY MOUTH EVERY DAY, Disp: 90 tablet, Rfl: 3    cholecalciferol (Vitamin D-3) 50 mcg (2,000 unit) capsule, Take 1 capsule (50 mcg) by mouth once daily., Disp: , Rfl:     cyclobenzaprine (Flexeril) 5 mg tablet, Take 1 tablet (5 mg) by mouth as needed at bedtime for muscle spasms (or Back Pain)., Disp: , Rfl:     dilTIAZem ER (Tiazac) 360 mg 24 hr capsule, Take 1 capsule (360 mg) by mouth once daily at bedtime., Disp: 90 capsule, Rfl: 2    doxazosin (Cardura) 4 mg tablet, Take 1.5 tablets (6 mg) by mouth once daily., Disp: 135 tablet, Rfl: 0     furosemide (Lasix) 20 mg tablet, Take 2 tablets (40 mg) by mouth once daily., Disp: 180 tablet, Rfl: 1    glucosamine/chondr nelson A sod (OSTEO BI-FLEX ORAL), Take 2 tablets by mouth 1 (one) time each day. Joint Sheild, Disp: , Rfl:     loratadine (Claritin Liqui-Gel) 10 mg capsule, Take 1 capsule by mouth if needed each day., Disp: , Rfl:     metFORMIN  mg 24 hr tablet, Take 1 tablet (500 mg) by mouth once daily., Disp: 90 tablet, Rfl: 0    metOLazone (Zaroxolyn) 2.5 mg tablet, TAKE 1 TABLET(2.5 MG) BY MOUTH 1 TIME EVERY WEEK, Disp: 12 tablet, Rfl: 0    metoprolol tartrate (Lopressor) 25 mg tablet, TAKE 1 TABLET BY MOUTH TWICE DAILY, Disp: 180 tablet, Rfl: 0    multivitamin/iron/folic acid (CENTRUM ORAL), Take 1 tablet by mouth 1 (one) time each day. Centrum Adult Oral Tablet, Disp: , Rfl:     nitroglycerin (Nitrostat) 0.4 mg SL tablet, Place 1 tablet (0.4 mg) under the tongue every 5 minutes if needed for chest pain., Disp: , Rfl:     pantoprazole (ProtoNix) 40 mg EC tablet, TAKE 1 TABLET BY MOUTH EVERY MORNING 30 MINUTES BEFORE BREAKFAST, Disp: 90 tablet, Rfl: 1    potassium chloride CR 20 mEq ER tablet, Take 1 tablet (20 mEq) by mouth once daily., Disp: 90 tablet, Rfl: 1    ramipril (Altace) 5 mg capsule, Take 1 capsule (5 mg) by mouth once daily., Disp: 90 capsule, Rfl: 2    warfarin (Coumadin) 5 mg tablet, TAKE 1 TABLET BY MOUTH DAILY OR AS DIRECTED, Disp: 90 tablet, Rfl: 1          Review of Systems   Constitutional:  Negative for fatigue, fever and unexpected weight change.   HENT:  Negative for congestion, facial swelling, nosebleeds, postnasal drip, rhinorrhea, sinus pressure and sinus pain.    Eyes:  Negative for discharge, redness and visual disturbance.   Respiratory:  Positive for shortness of breath. Negative for apnea, cough, choking, wheezing and stridor.    Cardiovascular:  Positive for leg swelling. Negative for chest pain and palpitations.   Gastrointestinal:  Negative for abdominal  distention, abdominal pain, constipation and nausea.   Endocrine: Negative for cold intolerance and heat intolerance.   Genitourinary:  Negative for difficulty urinating, dysuria, frequency and hematuria.   Musculoskeletal:  Negative for arthralgias, gait problem and joint swelling.   Allergic/Immunologic: Negative for environmental allergies, food allergies and immunocompromised state.   Neurological:  Negative for dizziness, tremors, syncope, speech difficulty, weakness, light-headedness, numbness and headaches.   Hematological:  Negative for adenopathy. Does not bruise/bleed easily.   Psychiatric/Behavioral:  Negative for agitation, behavioral problems and sleep disturbance. The patient is not nervous/anxious.         Vitals:    11/13/24 1010   BP: 102/59   Pulse: 60   Resp: 18   Temp: 36.4 °C (97.5 °F)   SpO2: 96%        Physical Exam  Vitals reviewed.   Constitutional:       Appearance: Normal appearance.   HENT:      Head: Normocephalic and atraumatic.   Eyes:      Extraocular Movements: Extraocular movements intact.   Cardiovascular:      Rate and Rhythm: Normal rate and regular rhythm.      Heart sounds: No murmur heard.     No friction rub. No gallop.   Pulmonary:      Effort: Pulmonary effort is normal. No respiratory distress.      Breath sounds: Normal breath sounds. No stridor. No wheezing, rhonchi or rales.   Chest:      Chest wall: No tenderness.   Abdominal:      General: Abdomen is flat. There is no distension.      Palpations: Abdomen is soft. There is no mass.      Tenderness: There is no abdominal tenderness.   Musculoskeletal:         General: Normal range of motion.      Cervical back: Normal range of motion.      Right lower leg: Edema present.      Left lower leg: Edema present.      Comments: 1+ edema   Skin:     General: Skin is warm and dry.   Neurological:      Mental Status: He is alert and oriented to person, place, and time.   Psychiatric:         Mood and Affect: Mood normal.          Behavior: Behavior normal.

## 2024-11-20 ENCOUNTER — LAB (OUTPATIENT)
Dept: LAB | Facility: LAB | Age: 89
End: 2024-11-20
Payer: MEDICARE

## 2024-11-20 DIAGNOSIS — Z79.01 CHRONIC ANTICOAGULATION: ICD-10-CM

## 2024-11-20 DIAGNOSIS — I48.91 ATRIAL FIBRILLATION, UNSPECIFIED TYPE (MULTI): ICD-10-CM

## 2024-11-20 LAB
INR PPP: 2.7 (ref 0.9–1.1)
PROTHROMBIN TIME: 30.7 SECONDS (ref 9.8–12.8)

## 2024-11-20 PROCEDURE — 85610 PROTHROMBIN TIME: CPT

## 2024-11-20 PROCEDURE — 36415 COLL VENOUS BLD VENIPUNCTURE: CPT

## 2024-11-25 ENCOUNTER — APPOINTMENT (OUTPATIENT)
Dept: PRIMARY CARE | Facility: CLINIC | Age: 89
End: 2024-11-25
Payer: MEDICARE

## 2024-11-25 VITALS
DIASTOLIC BLOOD PRESSURE: 52 MMHG | HEIGHT: 63 IN | HEART RATE: 75 BPM | WEIGHT: 175 LBS | SYSTOLIC BLOOD PRESSURE: 110 MMHG | BODY MASS INDEX: 31.01 KG/M2

## 2024-11-25 DIAGNOSIS — E78.5 HYPERLIPIDEMIA, UNSPECIFIED HYPERLIPIDEMIA TYPE: ICD-10-CM

## 2024-11-25 DIAGNOSIS — I10 BENIGN ESSENTIAL HYPERTENSION: ICD-10-CM

## 2024-11-25 DIAGNOSIS — Z79.01 CHRONIC ANTICOAGULATION: ICD-10-CM

## 2024-11-25 DIAGNOSIS — D64.9 ANEMIA, UNSPECIFIED TYPE: ICD-10-CM

## 2024-11-25 DIAGNOSIS — I48.91 ATRIAL FIBRILLATION, UNSPECIFIED TYPE (MULTI): ICD-10-CM

## 2024-11-25 DIAGNOSIS — I25.10 3-VESSEL CORONARY ARTERY DISEASE: ICD-10-CM

## 2024-11-25 DIAGNOSIS — Z00.00 MEDICARE ANNUAL WELLNESS VISIT, SUBSEQUENT: Primary | ICD-10-CM

## 2024-11-25 DIAGNOSIS — E11.9 TYPE 2 DIABETES MELLITUS WITHOUT COMPLICATION, WITHOUT LONG-TERM CURRENT USE OF INSULIN (MULTI): ICD-10-CM

## 2024-11-25 PROBLEM — R79.1 SUPRATHERAPEUTIC INR: Status: RESOLVED | Noted: 2024-06-11 | Resolved: 2024-11-25

## 2024-11-25 PROCEDURE — 1158F ADVNC CARE PLAN TLK DOCD: CPT | Performed by: SPECIALIST

## 2024-11-25 PROCEDURE — 1170F FXNL STATUS ASSESSED: CPT | Performed by: SPECIALIST

## 2024-11-25 PROCEDURE — 1123F ACP DISCUSS/DSCN MKR DOCD: CPT | Performed by: SPECIALIST

## 2024-11-25 PROCEDURE — 1036F TOBACCO NON-USER: CPT | Performed by: SPECIALIST

## 2024-11-25 PROCEDURE — 3074F SYST BP LT 130 MM HG: CPT | Performed by: SPECIALIST

## 2024-11-25 PROCEDURE — G0439 PPPS, SUBSEQ VISIT: HCPCS | Performed by: SPECIALIST

## 2024-11-25 PROCEDURE — 3078F DIAST BP <80 MM HG: CPT | Performed by: SPECIALIST

## 2024-11-25 PROCEDURE — 99397 PER PM REEVAL EST PAT 65+ YR: CPT | Performed by: SPECIALIST

## 2024-11-25 RX ORDER — NITROGLYCERIN 0.4 MG/1
0.4 TABLET SUBLINGUAL EVERY 5 MIN PRN
Qty: 30 TABLET | Refills: 0 | Status: SHIPPED | OUTPATIENT
Start: 2024-11-25

## 2024-11-25 ASSESSMENT — ENCOUNTER SYMPTOMS
LOSS OF SENSATION IN FEET: 0
OCCASIONAL FEELINGS OF UNSTEADINESS: 0
DEPRESSION: 0

## 2024-11-25 ASSESSMENT — ACTIVITIES OF DAILY LIVING (ADL)
TAKING_MEDICATION: INDEPENDENT
BATHING: INDEPENDENT
GROCERY_SHOPPING: INDEPENDENT
DRESSING: INDEPENDENT
MANAGING_FINANCES: INDEPENDENT

## 2024-11-25 ASSESSMENT — PATIENT HEALTH QUESTIONNAIRE - PHQ9
1. LITTLE INTEREST OR PLEASURE IN DOING THINGS: NOT AT ALL
2. FEELING DOWN, DEPRESSED OR HOPELESS: NOT AT ALL
SUM OF ALL RESPONSES TO PHQ9 QUESTIONS 1 AND 2: 0

## 2024-11-25 NOTE — PROGRESS NOTES
Subjective   Patient ID: Ariel Rodríguez is a 89 y.o. male who presents for Medicare Annual Wellness Visit Subsequent.  HPI    89 yo male Pmhx sig for Afib (on Warfarin), CAD, Rheumatic Fever, DM, Hyperlipidemia, BPH, Colon Polyps and Spinal stenosis here today for MAW accompanied by his wife     Has not needed sl nitroglycerin but his is old so needs refill    Does not have a living will or health care POA  Wife would make medical decisions if he were unable    Goals of care:  Treat treatable conditions, but would not want to prolong the act of dying if there were not hope for meaningful recovery.  Wife is aware.  Would want to attempt resuscitation in the even of cardiopulmonary arrest    No Known Allergies   Current Outpatient Medications   Medication Instructions    acetaminophen (Tylenol) 500 mg tablet 1 tablet, Every 8 hours PRN    aspirin 81 mg, Daily    atorvastatin (LIPITOR) 80 mg, oral, Daily    cholecalciferol (Vitamin D-3) 50 mcg (2,000 unit) capsule 1 capsule, Daily    cyclobenzaprine (Flexeril) 5 mg tablet 1 tablet, Nightly PRN    dilTIAZem ER (TIAZAC) 360 mg, oral, Nightly    doxazosin (CARDURA) 6 mg, oral, Daily    furosemide (LASIX) 40 mg, oral, Daily    glucosamine/chondr nelson A sod (OSTEO BI-FLEX ORAL) 2 tablets, Daily    loratadine (Claritin Liqui-Gel) 10 mg capsule 1 capsule, Daily PRN    metFORMIN XR (GLUCOPHAGE-XR) 500 mg, oral, Daily    metOLazone (Zaroxolyn) 2.5 mg tablet TAKE 1 TABLET(2.5 MG) BY MOUTH 1 TIME EVERY WEEK    metoprolol tartrate (LOPRESSOR) 25 mg, oral, 2 times daily    multivitamin/iron/folic acid (CENTRUM ORAL) 1 tablet, Daily    nitroglycerin (NITROSTAT) 0.4 mg, sublingual, Every 5 min PRN    pantoprazole (ProtoNix) 40 mg EC tablet TAKE 1 TABLET BY MOUTH EVERY MORNING 30 MINUTES BEFORE BREAKFAST    potassium chloride CR 20 mEq ER tablet 20 mEq, oral, Daily    ramipril (ALTACE) 5 mg, oral, Daily    warfarin (Coumadin) 5 mg tablet TAKE 1 TABLET BY MOUTH DAILY OR AS DIRECTED  "       Review of Systems  Constitutional  No fatigue, no fevers, no chills, no unintentional weight loss,   HEENT:  No headaches, no dizziness, no double vision, no blurred vision, eye exams current, positive hearing loss uses hearing aids  Cardiovascular:  No chest pain, no palpitations, Positive shortness of breath with exertion (one flight of stairs),   Respiratory:  Chronic cough, no hemoptysis, no wheezing, a second of shortness of breath at rest  when he first gets into bed but no paroxysmal nocturnal dyspnea  GI:  No dysphagia, no odynophagia, no reflux, no abdominal pain, no nausea, no vomiting, no changes in bowel habits, no bright red blood per rectum, no melena  :  No urinary frequency, no dysuria, no urine incontinence  MSK:  one fall, occasional stiff neck joint pain, no joint swelling  Neuro:  No tremors, no extremity weakness, no changes in sensation    Physical Exam  /52   Pulse 75   Ht 1.6 m (5' 3\")   Wt 79.4 kg (175 lb)   BMI 31.00 kg/m²   General: Well-appearing  M in no acute distress, well nourished, well hydrated  Head:  Normocephalic, atraumatic  Eyes:  Anicteric sclera, pupils equal  Ears:       TMs intact  Oral:     Not examined due to pandemic  Neck:   Supple, no cervical/supraclavicular adenopathy, no thyromegaly or nodules appreciated on exam  Cor:      Regular rate, normal S1, S2, no murmurs appreciated, no S3, no S4   Lungs:   Clear to auscultation b/l, no wheezes, no rhonchi, no crackles, no accessory respiratory muscle use  Abd:          Soft, nontender, no guarding, no rebound, no hepatosplenomegaly appreciated   Ext:            One plus bilateral (not wearing compression hose today) lower extremity edema, no palpable cords  Pulses:      Pedal pulses intact  DM foot exam performed today:  Monofilament plantar surface sensation intact, no interdigit lesions, plantar surface skin intact  Neuro:   CN2-12 grossly intact (except funduscopic exam not performed and visual fields " not examined)    Assessment/Plan     MAW  Previously received influenza vaccine 9/2024  Previously received Covid vaccine 9/2024  Prevnar 20 on 11/2024  RSV 10/2023  Tdap 12/2023  Recommend Shingrix vaccine  Prior negative Hep C Antibody 11/22/2021  Labs reviewed from 11/6/2024 (A1C CMP CBC Urine Albumin Lipid)    DM2  HBA1C 7.2  U Albumin 11/2024  DM foot exam done today  Sees optho annually  Continue Metformin  mg daily Statin and ACEI    Htn  BP well controlled on current medication    Anemia  Normocytic H/H 10.9/33.6   Normal GFR  Suspect ACD   Ordered additional labs:  B12 Folate Iron Retic and H/H    CAD, Hyperlipidemia  LDL cholesterol 33   Continue current dose of atorvastatin  Management per cardiology    Chronic anticoag, Parox Afib  INR 2.7 11/20/2024   Continue to monitor  Rate controlled       Trice June DO

## 2024-11-26 RX ORDER — POTASSIUM CHLORIDE 20 MEQ/1
20 TABLET, EXTENDED RELEASE ORAL DAILY
Qty: 90 TABLET | Refills: 1 | Status: SHIPPED | OUTPATIENT
Start: 2024-11-26

## 2024-11-26 NOTE — PROGRESS NOTES
Primary Care Physician: Trice June DO  Date of Visit: 2024 11:00 AM EST  [facility]: [facility] [place] [facility] [facility] / Summary:   Ariel Rodríguez is a 89 y.o. male  with coronary disease, hypertension, hyperlipidemia, diabetes, atrial fibrillation and congestive heart failure  In 2012 he had cardiac catheterization that showed diffuse severe coronary artery disease and medical therapy was recommended  On 14 he had chest pain and shortness of breath and was admitted to the hospital with an acute myocardial infarction. He had cardiac catheterization that showed diffuse severe disease and a new 95% lesion in the distal left anterior descending artery. He had stents put in the proximal and distal left anterior descending artery  One brother  at 94, one sister is 97 years old and one brother  at age of 89 and one sister  at 83,   He had an echocardiogram on September 15, 2021 that showed normal left ventricular systolic function with moderately increased septal thickness and severely dilated left atrium  Blood test on 2024 was all normal except for a glucose of 124 hematocrit 33.6%.  Cholesterol was 97 with HDL 41.4 LDL 33 triglycerides 115.    He can only walk 1 block and he will get short of breath.   Echocardiogram on 2023 showed normal left ventricular function and severe left atrial enlargement   He continues to do well with no chest pain and can only walk 1 block limited by his back  He is known to have problem with his right hemidiaphragm followed by pulmonary service  On examination his lungs are clear with decreased breath sounds at the right lower base, the liver is not enlarged and he has +2 leg edema  Left test on 2024 was all normal except for a sodium of 155 glucose 212 hematocrit 55.8% hemoglobin A1c 8.7 INR 3.3.  Cholesterol was 84 HDL 38.6 triglycerides 98 is  On metolazone 2.5 mg once a week for half an hour by his Lasix dose  and on that day take an additional dose of potassium he had excellent diuresis with excellent results and was no longer short of breath and his leg edema improved   At present he is doing well with no chest pain and no shortness of breath and is active but again is limited by his back and should do stretching exercises  We had a long discussion about diet and losing weight. Losing weight will help to control his diabetes. I did recommend 1500 poornima per day and no eating between meals.  I suggested starting Jardiance as an additional therapy but the patient at the present is opposed to taking any new medicines since he is doing so well  He is very happy with diuresis especially with metolazone once a week and keeps him stable and not short of breath and unchanged weight  He needs better control of his diabetes and will discuss this with his primary doctor  He would continue his current medical therapy and followup in 6 months.   Follow-up with pulmonary and primary doctor          Past Medical History:  Past Medical History:   Diagnosis Date    Acute myocardial infarction, unspecified 11/09/2022    Acute myocardial infarction    Acute upper respiratory infection, unspecified 04/10/2018    Viral URI    Atherosclerotic heart disease of native coronary artery with unspecified angina pectoris 11/21/2022    Athscl heart disease of native cor art w unsp ang pctrs    Benign neoplasm of colon, unspecified 08/26/2016    Colonic adenoma    Encounter for screening for malignant neoplasm of colon 07/24/2014    Colon cancer screening    Hyperglycemia 01/26/2023    Hyponatremia 01/26/2023    Obesity, unspecified 10/28/2020    Class 1 obesity with body mass index (BMI) of 32.0 to 32.9 in adult    Obesity, unspecified 11/09/2022    Obesity, Class I, BMI 30.0-34.9 (see actual BMI)    Olecranon bursitis, unspecified elbow 02/19/2020    Olecranon bursitis    Osteoarthritis of knee, unspecified 10/03/2017    Osteoarthrosis of knee     Other fecal abnormalities 08/26/2016    Guaiac positive stools    Personal history of other diseases of the circulatory system 08/17/2016    History of angina pectoris    Personal history of other diseases of the musculoskeletal system and connective tissue     History of arthritis    Personal history of other diseases of the nervous system and sense organs     History of cataract    Personal history of other diseases of the respiratory system 04/10/2018    History of acute bronchitis    Personal history of other drug therapy 10/01/2019    History of influenza vaccination    Personal history of other endocrine, nutritional and metabolic disease 12/26/2018    History of vitamin D deficiency    Personal history of other endocrine, nutritional and metabolic disease 02/26/2015    History of hypokalemia    Personal history of other specified conditions     History of heartburn    Personal history of thrombophlebitis 12/29/2015    History of deep vein thrombophlebitis of lower extremity    Pure hypercholesterolemia, unspecified 01/02/2019    Pure hypercholesterolemia, unspecified    Supratherapeutic INR 06/11/2024    Unspecified atrial fibrillation (Multi) 01/03/2023    Atrial fibrillation    Unspecified injury of head, sequela 11/29/2019    CHI (closed head injury), sequela        Past Surgical History:  Past Surgical History:   Procedure Laterality Date    APPENDECTOMY  01/20/2014    Appendectomy    CATARACT EXTRACTION  01/20/2014    Cataract Surgery    TONSILLECTOMY  01/20/2014    Tonsillectomy          Social History:   reports that he has never smoked. He has never used smokeless tobacco. He reports current alcohol use. He reports that he does not use drugs.     Family History:  family history includes Brain Tumor in his sister; Breast cancer in his niece; Coronary artery disease in his mother; Heart failure in his brother, mother, and sister; Lung cancer in his brother and another family member; Malignant Neoplasm  in his brother; Mild Cognitive Impairment in his brother; Myocardial Infarction in his sister; Prostate cancer in his brother; Stomach cancer in his father.      Allergies:  No Known Allergies    Outpatient Medications:  Current Outpatient Medications   Medication Instructions    acetaminophen (Tylenol) 500 mg tablet 1 tablet, Every 8 hours PRN    aspirin 81 mg, Daily    atorvastatin (LIPITOR) 80 mg, oral, Daily    cholecalciferol (Vitamin D-3) 50 mcg (2,000 unit) capsule 1 capsule, Daily    cyclobenzaprine (Flexeril) 5 mg tablet 1 tablet, Nightly PRN    dilTIAZem ER (TIAZAC) 360 mg, oral, Nightly    doxazosin (CARDURA) 6 mg, oral, Daily    furosemide (LASIX) 40 mg, oral, Daily    glucosamine/chondr nelson A sod (OSTEO BI-FLEX ORAL) 2 tablets, Daily    loratadine (Claritin Liqui-Gel) 10 mg capsule 1 capsule, Daily PRN    metFORMIN XR (GLUCOPHAGE-XR) 500 mg, oral, Daily    metOLazone (Zaroxolyn) 2.5 mg tablet TAKE 1 TABLET(2.5 MG) BY MOUTH 1 TIME EVERY WEEK    metoprolol tartrate (LOPRESSOR) 25 mg, oral, 2 times daily    multivitamin/iron/folic acid (CENTRUM ORAL) 1 tablet, Daily    nitroglycerin (NITROSTAT) 0.4 mg, sublingual, Every 5 min PRN    pantoprazole (ProtoNix) 40 mg EC tablet TAKE 1 TABLET BY MOUTH EVERY MORNING 30 MINUTES BEFORE BREAKFAST    potassium chloride CR 20 mEq ER tablet 20 mEq, oral, Daily    ramipril (ALTACE) 5 mg, oral, Daily    warfarin (Coumadin) 5 mg tablet TAKE 1 TABLET BY MOUTH DAILY OR AS DIRECTED       Physical Exam:  Constitutional: alert and in no acute distress.  Eyes: no erythema, swelling or discharge from the eye.  Neck: neck is supple, symmetric, trachea midline, no masses, and no thyromegaly.  Pulmonary: no increased work of breathing or signs of respiratory distress and lungs clear to auscultation.  Cardiovascular: carotid pulses 2+ bilaterally with no bruit, JVP was normal, no thrills, regular rhythm, normal S1 and S2, no murmurs, pedal pulses 2+ bilaterally and no pitting  "edema.  Abdomen: Abdomen non-tender, no masses, and no hepatomegaly.  Skin: Skin warm and dry, normal skin turgor  Neurologic: non- focal neurologic examination.  Psychiatric: judgement and insight is normal and oriented to person place and time.      There were no vitals filed for this visit.  Wt Readings from Last 5 Encounters:   11/25/24 79.4 kg (175 lb)   11/13/24 80.7 kg (178 lb)   11/06/24 73.9 kg (163 lb)   05/29/24 76.7 kg (169 lb)   05/23/24 80.3 kg (177 lb)     There is no height or weight on file to calculate BMI.      Last Labs:  CMP:  Recent Labs     11/06/24  1134 08/28/24  1031 05/29/24  1209 01/16/24  1119 10/17/23  1121    137 137 135* 140   K 3.8 4.1 4.6 4.2 4.2   CL 98 98 100 98 103   CO2 32 27 27 27 27   ANIONGAP 12 16 15 14 14   BUN 27* 28* 30* 32* 21   CREATININE 1.11 1.06 1.21 1.08 0.94   EGFR 63 67 57* 66 78   GLUCOSE 124* 233* 169* 242* 217*     Recent Labs     11/06/24  1134 05/29/24  1209 01/16/24  1119 10/17/23  1121 05/31/23  1119   ALBUMIN 3.9 3.8 3.8 3.8 4.0   ALKPHOS 100 86 79 77 88   ALT 14 14 14 14 14   AST 19 22 19 17 19   BILITOT 0.8 0.7 0.8 0.9 0.8     CBC:  Recent Labs     11/06/24  1134 05/29/24  1209 01/16/24  1119 10/17/23  1121 03/21/23  1115   WBC 6.4 6.3 5.8 5.6 6.1   HGB 10.9* 11.3* 11.8* 11.5* 11.7*   HCT 33.6* 35.2* 35.8* 37.3* 37.9*    162 164 161 156   MCV 89 93 91 97 94     COAG:   Recent Labs     11/20/24  1050 10/22/24  1104 09/25/24  1103 07/29/24  1021 07/17/24  1039   INR 2.7* 2.0* 2.4* 2.2* 3.3*     HEME/ENDO:  Recent Labs     11/06/24  1134 05/29/24  1209 01/16/24  1119 10/17/23  1121 11/09/22  1117 07/06/22  0835 03/02/22  1014 11/22/21  1200   IRONSAT  --   --   --   --   --  18*  --  22*   HGBA1C 7.2* 6.8* 7.0* 6.8*   < > 7.2*   < > 7.1*    < > = values in this interval not displayed.      CARDIAC: No results for input(s): \"LDH\", \"CKMB\", \"TROPHS\", \"BNP\" in the last 69990 hours.    No lab exists for component: \"CK\", \"CKMBP\"  Recent Labs     " 11/06/24  1134 01/16/24  1119 11/09/22  1117 07/06/22  0835 06/22/21  0815   CHOL 97 84 93 103 93   LDLF  --   --  33 38 30   HDL 41.4 38.6 39.0* 42.4 38.9*   TRIG 115 98 105 113 120       Last Cardiology Tests:  ECG:    Electrocardiogram shows atrial fibrillation with heart rate of 61/min, low QRS voltage and otherwise normal    Echo:  Echo Results:  No results found for this or any previous visit from the past 3650 days.       Cath:      Stress Test:  Stress Results:  No results found for this or any previous visit from the past 365 days.         Cardiac Imaging:  XR chest 2 views  Narrative: Interpreted By:  Ted Garcia,   STUDY:  XR CHEST 2 VIEWS;  11/13/2024 10:26 am      INDICATION:  Signs/Symptoms:effusion.      ,J90 Pleural effusion, not elsewhere classified      COMPARISON:  05/13/2024      ACCESSION NUMBER(S):  [id number]      ORDERING CLINICIAN:  GILBERT SANDY      FINDINGS:  PA and lateral radiographs of the chest were provided.              CARDIOMEDIASTINAL SILHOUETTE:  Cardiomediastinal silhouette is unchanged in size and configuration.      LUNGS:  Similar moderate to large right pleural effusion and atelectasis.  Superimposed consolidation is not excluded. Subsegmental left basilar  atelectasis.      ABDOMEN:  No remarkable upper abdominal findings.      BONES:  No acute osseous changes.      Impression: 1.  Moderate to large right basilar pleural effusion and atelectasis.  Superimposed consolidation not excluded. This is similar to the prior.              MACRO:  None      Signed by: Ted Garcia 11/16/2024 12:33 PM  Dictation workstation:   [id number]          Orders:  No orders of the defined types were placed in this encounter.     Followup Appts:  Future Appointments   Date Time Provider Department Center   11/27/2024 11:00 AM Jovanny Ruggiero MD [id number] [facility]   3/24/2025  1:00 PM Trice June DO [id number] [facility]   5/13/2025 10:00 AM Gilbert Sandy MD MPH [id number] [facility]    11/12/2025  9:00 TARSHA June DO [id number]           ____________________________________________________________  Jovanny Ruggiero [facility]

## 2024-11-27 ENCOUNTER — APPOINTMENT (OUTPATIENT)
Dept: CARDIOLOGY | Facility: CLINIC | Age: 89
End: 2024-11-27
Payer: MEDICARE

## 2024-11-27 VITALS
DIASTOLIC BLOOD PRESSURE: 61 MMHG | HEIGHT: 63 IN | SYSTOLIC BLOOD PRESSURE: 106 MMHG | BODY MASS INDEX: 30.48 KG/M2 | OXYGEN SATURATION: 94 % | WEIGHT: 172 LBS | HEART RATE: 77 BPM

## 2024-11-27 DIAGNOSIS — Z86.79 HISTORY OF ATHEROSCLEROTIC HEART DISEASE: ICD-10-CM

## 2024-11-27 DIAGNOSIS — I50.20 SYSTOLIC CONGESTIVE HEART FAILURE, UNSPECIFIED HF CHRONICITY: ICD-10-CM

## 2024-11-27 DIAGNOSIS — E78.5 HYPERLIPIDEMIA, UNSPECIFIED HYPERLIPIDEMIA TYPE: ICD-10-CM

## 2024-11-27 DIAGNOSIS — I48.91 ATRIAL FIBRILLATION, UNSPECIFIED TYPE (MULTI): Primary | ICD-10-CM

## 2024-11-27 DIAGNOSIS — I25.10 3-VESSEL CORONARY ARTERY DISEASE: ICD-10-CM

## 2024-11-27 PROCEDURE — 99214 OFFICE O/P EST MOD 30 MIN: CPT | Performed by: INTERNAL MEDICINE

## 2024-11-27 PROCEDURE — 3074F SYST BP LT 130 MM HG: CPT | Performed by: INTERNAL MEDICINE

## 2024-11-27 PROCEDURE — 3078F DIAST BP <80 MM HG: CPT | Performed by: INTERNAL MEDICINE

## 2024-11-27 PROCEDURE — G2211 COMPLEX E/M VISIT ADD ON: HCPCS | Performed by: INTERNAL MEDICINE

## 2024-11-27 PROCEDURE — 93005 ELECTROCARDIOGRAM TRACING: CPT | Performed by: INTERNAL MEDICINE

## 2024-11-27 PROCEDURE — 1159F MED LIST DOCD IN RCRD: CPT | Performed by: INTERNAL MEDICINE

## 2024-11-27 PROCEDURE — 1126F AMNT PAIN NOTED NONE PRSNT: CPT | Performed by: INTERNAL MEDICINE

## 2024-11-27 PROCEDURE — 1036F TOBACCO NON-USER: CPT | Performed by: INTERNAL MEDICINE

## 2024-11-27 ASSESSMENT — PATIENT HEALTH QUESTIONNAIRE - PHQ9
2. FEELING DOWN, DEPRESSED OR HOPELESS: NOT AT ALL
SUM OF ALL RESPONSES TO PHQ9 QUESTIONS 1 AND 2: 0
1. LITTLE INTEREST OR PLEASURE IN DOING THINGS: NOT AT ALL

## 2024-11-27 ASSESSMENT — COLUMBIA-SUICIDE SEVERITY RATING SCALE - C-SSRS
2. HAVE YOU ACTUALLY HAD ANY THOUGHTS OF KILLING YOURSELF?: NO
6. HAVE YOU EVER DONE ANYTHING, STARTED TO DO ANYTHING, OR PREPARED TO DO ANYTHING TO END YOUR LIFE?: NO
1. IN THE PAST MONTH, HAVE YOU WISHED YOU WERE DEAD OR WISHED YOU COULD GO TO SLEEP AND NOT WAKE UP?: NO

## 2024-11-27 ASSESSMENT — LIFESTYLE VARIABLES
SKIP TO QUESTIONS 9-10: 1
HOW OFTEN DO YOU HAVE SIX OR MORE DRINKS ON ONE OCCASION: NEVER
HOW OFTEN DO YOU HAVE A DRINK CONTAINING ALCOHOL: NEVER
HOW MANY STANDARD DRINKS CONTAINING ALCOHOL DO YOU HAVE ON A TYPICAL DAY: PATIENT DOES NOT DRINK
AUDIT-C TOTAL SCORE: 0

## 2024-11-27 ASSESSMENT — ENCOUNTER SYMPTOMS
OCCASIONAL FEELINGS OF UNSTEADINESS: 0
DEPRESSION: 0
LOSS OF SENSATION IN FEET: 0

## 2024-11-27 ASSESSMENT — PAIN SCALES - GENERAL: PAINLEVEL_OUTOF10: 0-NO PAIN

## 2024-11-29 LAB
ATRIAL RATE: 102 BPM
Q ONSET: 217 MS
QRS COUNT: 10 BEATS
QRS DURATION: 92 MS
QT INTERVAL: 408 MS
QTC CALCULATION(BAZETT): 410 MS
QTC FREDERICIA: 410 MS
R AXIS: 61 DEGREES
T AXIS: 37 DEGREES
T OFFSET: 421 MS
VENTRICULAR RATE: 61 BPM

## 2024-12-18 ENCOUNTER — LAB (OUTPATIENT)
Dept: LAB | Facility: LAB | Age: 89
End: 2024-12-18
Payer: MEDICARE

## 2024-12-18 DIAGNOSIS — Z79.01 CHRONIC ANTICOAGULATION: ICD-10-CM

## 2024-12-18 DIAGNOSIS — D64.9 ANEMIA, UNSPECIFIED TYPE: ICD-10-CM

## 2024-12-18 DIAGNOSIS — I48.91 ATRIAL FIBRILLATION, UNSPECIFIED TYPE (MULTI): ICD-10-CM

## 2024-12-18 LAB
FOLATE SERPL-MCNC: 19.9 NG/ML
HCT VFR BLD AUTO: 34.4 % (ref 41–52)
HGB BLD-MCNC: 10.9 G/DL (ref 13.5–17.5)
HGB RETIC QN: 32 PG (ref 28–38)
IMMATURE RETIC FRACTION: 11.9 %
INR PPP: 2.4 (ref 0.9–1.1)
IRON SATN MFR SERPL: 15 % (ref 25–45)
IRON SERPL-MCNC: 51 UG/DL (ref 35–150)
PROTHROMBIN TIME: 26.8 SECONDS (ref 9.8–12.8)
RETICS #: 0.05 X10*6/UL (ref 0.02–0.11)
RETICS/RBC NFR AUTO: 1.3 % (ref 0.5–2)
TIBC SERPL-MCNC: 335 UG/DL (ref 240–445)
UIBC SERPL-MCNC: 284 UG/DL (ref 110–370)
VIT B12 SERPL-MCNC: 627 PG/ML (ref 211–911)

## 2024-12-18 PROCEDURE — 85610 PROTHROMBIN TIME: CPT

## 2024-12-18 PROCEDURE — 83550 IRON BINDING TEST: CPT

## 2024-12-18 PROCEDURE — 85018 HEMOGLOBIN: CPT

## 2024-12-18 PROCEDURE — 83540 ASSAY OF IRON: CPT

## 2024-12-18 PROCEDURE — 85045 AUTOMATED RETICULOCYTE COUNT: CPT

## 2024-12-18 PROCEDURE — 85014 HEMATOCRIT: CPT

## 2024-12-18 PROCEDURE — 36415 COLL VENOUS BLD VENIPUNCTURE: CPT

## 2024-12-18 PROCEDURE — 82746 ASSAY OF FOLIC ACID SERUM: CPT

## 2024-12-18 PROCEDURE — 82607 VITAMIN B-12: CPT

## 2024-12-23 DIAGNOSIS — I48.91 ATRIAL FIBRILLATION, UNSPECIFIED TYPE (MULTI): ICD-10-CM

## 2024-12-23 DIAGNOSIS — M47.816 SPONDYLOSIS WITHOUT MYELOPATHY OR RADICULOPATHY, LUMBAR REGION: Primary | ICD-10-CM

## 2024-12-23 DIAGNOSIS — K21.9 GASTROESOPHAGEAL REFLUX DISEASE WITHOUT ESOPHAGITIS: ICD-10-CM

## 2024-12-23 DIAGNOSIS — R06.09 DYSPNEA ON EXERTION: ICD-10-CM

## 2024-12-24 RX ORDER — WARFARIN SODIUM 5 MG/1
TABLET ORAL
Qty: 90 TABLET | Refills: 0 | Status: SHIPPED | OUTPATIENT
Start: 2024-12-24

## 2024-12-24 RX ORDER — PANTOPRAZOLE SODIUM 40 MG/1
TABLET, DELAYED RELEASE ORAL
Qty: 90 TABLET | Refills: 0 | Status: SHIPPED | OUTPATIENT
Start: 2024-12-24

## 2025-01-21 ENCOUNTER — LAB (OUTPATIENT)
Dept: LAB | Facility: LAB | Age: OVER 89
End: 2025-01-21
Payer: MEDICARE

## 2025-01-21 DIAGNOSIS — Z79.01 CHRONIC ANTICOAGULATION: ICD-10-CM

## 2025-01-21 DIAGNOSIS — I48.91 ATRIAL FIBRILLATION, UNSPECIFIED TYPE (MULTI): ICD-10-CM

## 2025-01-21 LAB
INR PPP: 2.3 (ref 0.9–1.1)
PROTHROMBIN TIME: 25.6 SECONDS (ref 9.8–12.8)

## 2025-01-21 PROCEDURE — 85610 PROTHROMBIN TIME: CPT

## 2025-01-27 DIAGNOSIS — E11.9 TYPE 2 DIABETES MELLITUS WITHOUT COMPLICATION, WITHOUT LONG-TERM CURRENT USE OF INSULIN (MULTI): ICD-10-CM

## 2025-01-28 RX ORDER — METFORMIN HYDROCHLORIDE 500 MG/1
500 TABLET, EXTENDED RELEASE ORAL DAILY
Qty: 90 TABLET | Refills: 0 | Status: SHIPPED | OUTPATIENT
Start: 2025-01-28

## 2025-02-19 LAB
INR PPP: 2.4
PROTHROMBIN TIME: 24.3 SEC (ref 9–11.5)

## 2025-03-20 LAB
INR PPP: 2.2
PROTHROMBIN TIME: 21.6 SEC (ref 9–11.5)

## 2025-03-23 DIAGNOSIS — I47.10 PAROXYSMAL SUPRAVENTRICULAR TACHYCARDIA (CMS-HCC): ICD-10-CM

## 2025-03-24 ENCOUNTER — APPOINTMENT (OUTPATIENT)
Dept: PRIMARY CARE | Facility: CLINIC | Age: OVER 89
End: 2025-03-24
Payer: MEDICARE

## 2025-03-24 VITALS
HEART RATE: 64 BPM | SYSTOLIC BLOOD PRESSURE: 98 MMHG | BODY MASS INDEX: 30.89 KG/M2 | OXYGEN SATURATION: 91 % | WEIGHT: 174.4 LBS | DIASTOLIC BLOOD PRESSURE: 46 MMHG

## 2025-03-24 DIAGNOSIS — K59.00 CONSTIPATION, UNSPECIFIED CONSTIPATION TYPE: ICD-10-CM

## 2025-03-24 DIAGNOSIS — I10 BENIGN ESSENTIAL HYPERTENSION: ICD-10-CM

## 2025-03-24 DIAGNOSIS — E11.9 TYPE 2 DIABETES MELLITUS WITHOUT COMPLICATION, WITHOUT LONG-TERM CURRENT USE OF INSULIN: Primary | ICD-10-CM

## 2025-03-24 DIAGNOSIS — Z79.01 CHRONIC ANTICOAGULATION: ICD-10-CM

## 2025-03-24 DIAGNOSIS — R06.09 DYSPNEA ON EXERTION: ICD-10-CM

## 2025-03-24 PROCEDURE — 3074F SYST BP LT 130 MM HG: CPT | Performed by: SPECIALIST

## 2025-03-24 PROCEDURE — G2211 COMPLEX E/M VISIT ADD ON: HCPCS | Performed by: SPECIALIST

## 2025-03-24 PROCEDURE — 3078F DIAST BP <80 MM HG: CPT | Performed by: SPECIALIST

## 2025-03-24 PROCEDURE — 1160F RVW MEDS BY RX/DR IN RCRD: CPT | Performed by: SPECIALIST

## 2025-03-24 PROCEDURE — 99214 OFFICE O/P EST MOD 30 MIN: CPT | Performed by: SPECIALIST

## 2025-03-24 PROCEDURE — 1159F MED LIST DOCD IN RCRD: CPT | Performed by: SPECIALIST

## 2025-03-24 PROCEDURE — 1126F AMNT PAIN NOTED NONE PRSNT: CPT | Performed by: SPECIALIST

## 2025-03-24 RX ORDER — FUROSEMIDE 20 MG/1
40 TABLET ORAL DAILY
Qty: 180 TABLET | Refills: 1 | Status: SHIPPED | OUTPATIENT
Start: 2025-03-24

## 2025-03-24 RX ORDER — METOPROLOL TARTRATE 25 MG/1
25 TABLET, FILM COATED ORAL 2 TIMES DAILY
Qty: 180 TABLET | Refills: 1 | Status: SHIPPED | OUTPATIENT
Start: 2025-03-24

## 2025-03-24 ASSESSMENT — PAIN SCALES - GENERAL: PAINLEVEL_OUTOF10: 0-NO PAIN

## 2025-03-24 NOTE — PROGRESS NOTES
Subjective   Patient ID: Ariel Rodríguez is a 89 y.o. male who presents for Follow-up.  HPI    89 yo male Pmhx sig for Afib (on Warfarin), CAD, Rheumatic Fever, DM, Hyperlipidemia, BPH, Colon Polyps and Spinal stenosis here today for follow-up accompanied by his wife    Said no one called with INR (I had sent result note for staff to call)    Needs refill Metoprolol Pantoprazole and Furosemide  Has not needed nitroglycerin    Doing ok  Feeling more short of breath with exertion, after catches breath had to urinate  Discussed prior CXR, mod-large right pleural effusion on cxr 11/2024, declined repeat CXR since he will be seeing Pulm soon    Gets a little SOB going to bed until situated, no paroxysmal nocturnal dyspnea     Hx of Pneumonia in 1975, got re-hospitalized for pneumonia and had pleural effusion    No Known Allergies   Current Outpatient Medications   Medication Instructions    acetaminophen (Tylenol) 500 mg tablet 1 tablet, Every 8 hours PRN    aspirin 81 mg, Daily    atorvastatin (LIPITOR) 80 mg, oral, Daily    cholecalciferol (Vitamin D-3) 50 mcg (2,000 unit) capsule 1 capsule, Daily    cyclobenzaprine (Flexeril) 5 mg tablet 1 tablet, Nightly PRN    dilTIAZem ER (TIAZAC) 360 mg, oral, Nightly    doxazosin (CARDURA) 6 mg, oral, Daily    furosemide (LASIX) 40 mg, oral, Daily    glucosamine/chondr nelson A sod (OSTEO BI-FLEX ORAL) 2 tablets, Daily    loratadine (Claritin Liqui-Gel) 10 mg capsule 1 capsule, Daily PRN    metFORMIN XR (GLUCOPHAGE-XR) 500 mg, oral, Daily    metOLazone (Zaroxolyn) 2.5 mg tablet TAKE 1 TABLET(2.5 MG) BY MOUTH 1 TIME EVERY WEEK    metoprolol tartrate (LOPRESSOR) 25 mg, oral, 2 times daily    multivitamin/iron/folic acid (CENTRUM ORAL) 1 tablet, Daily    nitroglycerin (NITROSTAT) 0.4 mg, sublingual, Every 5 min PRN    pantoprazole (ProtoNix) 40 mg EC tablet TAKE 1 TABLET BY MOUTH EVERY MORNING 30 MINUTES BEFORE BREAKFAST    potassium chloride CR 20 mEq ER tablet 20 mEq, oral, Daily     ramipril (ALTACE) 5 mg, oral, Daily    warfarin (Coumadin) 5 mg tablet Take 1 tablet by mouth daily or as directed        Review of Systems  Constitutional  No fatigue, no fevers, no chills, no unintentional weight loss,   HEENT:  No headaches, no dizziness, no double vision, no blurred vision, no hearing loss  Cardiovascular:  No chest pain, no palpitations, Positive shortness of breath with exertion (one flight of stairs),   Respiratory:  Chronic cough, No shortness of breath at rest   GI:  No abdominal pain, no nausea, no vomiting, positive constipation taking colace  :  No urine incontinence, nocturia x 1    Physical Exam  BP (!) 98/46   Pulse 64   Wt 79.1 kg (174 lb 6.4 oz)   SpO2 91%   BMI 30.89 kg/m²   General:    Well-appearing M in no acute distress, well nourished, well hydrated  Head:  Normocephalic, atraumatic  Skin:          Warm dry,   Eyes:  Anicteric sclera, pupils equal,   Oral:      Not examined due to pandemic  Neck:   Supple  Cor:      Regular rate, normal S1, S2, no murmurs appreciated, no S3, no S4   Lungs:   Clear to auscultation b/l, no wheezes, no rhonchi, no crackles, no accessory respiratory muscle use      Assessment/Plan   Assessment & Plan  Dyspnea on exertion  Refilled Lasix  CXR 11/2024 with mod-large right pleural effusion   Declined CXR  Plans to see Pulm Soon  Orders:    furosemide (Lasix) 20 mg tablet; Take 2 tablets (40 mg) by mouth once daily.    CBC; Future    Type 2 diabetes mellitus without complication, without long-term current use of insulin (Multi)  A1C 7.2 in November, had been 6.8 in May 2024  GFR 63 so could increase dose metformin if needed, currently takes Metformin  mg daily  Labs ordered  Eye exams current 11/2024   Labs ordered  On ACEI and Statin  Completed DM foot exam at [facility] in November 2024, does not see podiatyr  Orders:    Hemoglobin A1C; Future    Albumin-Creatinine Ratio, Urine Random; Future    Comprehensive Metabolic Panel; Future    Benign  essential hypertension  Well-controlled on current medication  Orders:    Comprehensive Metabolic Panel; Future    Constipation, unspecified constipation type  Taking Colace  Discussed, trial of half dose MiraLAX daily  Check INR one week after taking MiraLAX       Chronic anticoagulation  INR ordered  Most recent INR 2.2  Orders:    Protime-INR; Future    F/up 4 mos       Trice June DO

## 2025-03-25 NOTE — ASSESSMENT & PLAN NOTE
A1C 7.2 in November, had been 6.8 in May 2024  GFR 63 so could increase dose metformin if needed, currently takes Metformin  mg daily  Labs ordered  Eye exams current 11/2024   Labs ordered  On ACEI and Statin  Completed DM foot exam at [facility] in November 2024, does not see podiatyr  Orders:    Hemoglobin A1C; Future    Albumin-Creatinine Ratio, Urine Random; Future    Comprehensive Metabolic Panel; Future

## 2025-03-25 NOTE — ASSESSMENT & PLAN NOTE
Refilled Lasix  CXR 11/2024 with mod-large right pleural effusion   Declined CXR  Plans to see Pulm Soon  Orders:    furosemide (Lasix) 20 mg tablet; Take 2 tablets (40 mg) by mouth once daily.    CBC; Future

## 2025-03-27 DIAGNOSIS — Z79.01 CHRONIC ANTICOAGULATION: ICD-10-CM

## 2025-04-03 LAB
ALBUMIN SERPL-MCNC: 3.9 G/DL (ref 3.6–5.1)
ALBUMIN/CREAT UR: 31 MG/G CREAT
ALP SERPL-CCNC: 90 U/L (ref 35–144)
ALT SERPL-CCNC: 13 U/L (ref 9–46)
ANION GAP SERPL CALCULATED.4IONS-SCNC: 11 MMOL/L (CALC) (ref 7–17)
AST SERPL-CCNC: 18 U/L (ref 10–35)
BILIRUB SERPL-MCNC: 0.7 MG/DL (ref 0.2–1.2)
BUN SERPL-MCNC: 26 MG/DL (ref 7–25)
CALCIUM SERPL-MCNC: 9.1 MG/DL (ref 8.6–10.3)
CHLORIDE SERPL-SCNC: 95 MMOL/L (ref 98–110)
CO2 SERPL-SCNC: 30 MMOL/L (ref 20–32)
CREAT SERPL-MCNC: 1.05 MG/DL (ref 0.7–1.22)
CREAT UR-MCNC: 62 MG/DL (ref 20–320)
EGFRCR SERPLBLD CKD-EPI 2021: 67 ML/MIN/1.73M2
ERYTHROCYTE [DISTWIDTH] IN BLOOD BY AUTOMATED COUNT: 13.8 % (ref 11–15)
EST. AVERAGE GLUCOSE BLD GHB EST-MCNC: 169 MG/DL
EST. AVERAGE GLUCOSE BLD GHB EST-SCNC: 9.3 MMOL/L
GLUCOSE SERPL-MCNC: 237 MG/DL (ref 65–139)
HBA1C MFR BLD: 7.5 % OF TOTAL HGB
HCT VFR BLD AUTO: 34.2 % (ref 38.5–50)
HGB BLD-MCNC: 10.7 G/DL (ref 13.2–17.1)
INR PPP: 2.3
MCH RBC QN AUTO: 28.7 PG (ref 27–33)
MCHC RBC AUTO-ENTMCNC: 31.3 G/DL (ref 32–36)
MCV RBC AUTO: 91.7 FL (ref 80–100)
MICROALBUMIN UR-MCNC: 1.9 MG/DL
PLATELET # BLD AUTO: 163 THOUSAND/UL (ref 140–400)
PMV BLD REES-ECKER: 10.8 FL (ref 7.5–12.5)
POTASSIUM SERPL-SCNC: 4.3 MMOL/L (ref 3.5–5.3)
PROT SERPL-MCNC: 7.2 G/DL (ref 6.1–8.1)
PROTHROMBIN TIME: 22.6 SEC (ref 9–11.5)
RBC # BLD AUTO: 3.73 MILLION/UL (ref 4.2–5.8)
SODIUM SERPL-SCNC: 136 MMOL/L (ref 135–146)
WBC # BLD AUTO: 6.2 THOUSAND/UL (ref 3.8–10.8)

## 2025-04-14 ENCOUNTER — TELEPHONE (OUTPATIENT)
Dept: PRIMARY CARE | Facility: CLINIC | Age: OVER 89
End: 2025-04-14
Payer: MEDICARE

## 2025-04-14 DIAGNOSIS — I10 BENIGN ESSENTIAL HYPERTENSION: ICD-10-CM

## 2025-04-14 RX ORDER — DILTIAZEM HYDROCHLORIDE 360 MG/1
360 CAPSULE, EXTENDED RELEASE ORAL NIGHTLY
Qty: 90 CAPSULE | Refills: 1 | Status: ON HOLD | OUTPATIENT
Start: 2025-04-14

## 2025-04-15 DIAGNOSIS — Z79.01 CHRONIC ANTICOAGULATION: ICD-10-CM

## 2025-04-15 DIAGNOSIS — I48.91 ATRIAL FIBRILLATION, UNSPECIFIED TYPE (MULTI): Primary | ICD-10-CM

## 2025-04-17 ENCOUNTER — APPOINTMENT (OUTPATIENT)
Dept: CARDIOLOGY | Facility: HOSPITAL | Age: OVER 89
DRG: 291 | End: 2025-04-17
Payer: MEDICARE

## 2025-04-17 ENCOUNTER — APPOINTMENT (OUTPATIENT)
Dept: RADIOLOGY | Facility: HOSPITAL | Age: OVER 89
DRG: 291 | End: 2025-04-17
Payer: MEDICARE

## 2025-04-17 ENCOUNTER — HOSPITAL ENCOUNTER (INPATIENT)
Facility: HOSPITAL | Age: OVER 89
DRG: 291 | End: 2025-04-17
Attending: EMERGENCY MEDICINE | Admitting: INTERNAL MEDICINE
Payer: MEDICARE

## 2025-04-17 DIAGNOSIS — R94.31 ABNORMAL ELECTROCARDIOGRAM (ECG) (EKG): ICD-10-CM

## 2025-04-17 DIAGNOSIS — I10 BENIGN ESSENTIAL HYPERTENSION: ICD-10-CM

## 2025-04-17 DIAGNOSIS — J90 PLEURAL EFFUSION ON RIGHT: ICD-10-CM

## 2025-04-17 DIAGNOSIS — I50.20 SYSTOLIC CONGESTIVE HEART FAILURE, UNSPECIFIED HF CHRONICITY: ICD-10-CM

## 2025-04-17 DIAGNOSIS — I50.9 OTHER CONGESTIVE HEART FAILURE: ICD-10-CM

## 2025-04-17 DIAGNOSIS — I50.82 BIVENTRICULAR CONGESTIVE HEART FAILURE: Primary | ICD-10-CM

## 2025-04-17 DIAGNOSIS — I48.91 ATRIAL FIBRILLATION, UNSPECIFIED TYPE (MULTI): ICD-10-CM

## 2025-04-17 LAB
ALBUMIN SERPL BCP-MCNC: 3.6 G/DL (ref 3.4–5)
ALP SERPL-CCNC: 85 U/L (ref 33–136)
ALT SERPL W P-5'-P-CCNC: 14 U/L (ref 10–52)
ANION GAP BLDV CALCULATED.4IONS-SCNC: 9 MMOL/L (ref 10–25)
ANION GAP SERPL CALC-SCNC: 13 MMOL/L (ref 10–20)
AST SERPL W P-5'-P-CCNC: 22 U/L (ref 9–39)
BASE EXCESS BLDV CALC-SCNC: 3.4 MMOL/L (ref -2–3)
BASOPHILS # BLD AUTO: 0.02 X10*3/UL (ref 0–0.1)
BASOPHILS NFR BLD AUTO: 0.3 %
BILIRUB DIRECT SERPL-MCNC: 0.3 MG/DL (ref 0–0.3)
BILIRUB SERPL-MCNC: 0.7 MG/DL (ref 0–1.2)
BNP SERPL-MCNC: 466 PG/ML (ref 0–99)
BODY TEMPERATURE: 37 DEGREES CELSIUS
BUN SERPL-MCNC: 26 MG/DL (ref 6–23)
CA-I BLDV-SCNC: 1.22 MMOL/L (ref 1.1–1.33)
CALCIUM SERPL-MCNC: 8.7 MG/DL (ref 8.6–10.3)
CARDIAC TROPONIN I PNL SERPL HS: 5 NG/L (ref 0–20)
CARDIAC TROPONIN I PNL SERPL HS: 5 NG/L (ref 0–20)
CHLORIDE BLDV-SCNC: 98 MMOL/L (ref 98–107)
CHLORIDE SERPL-SCNC: 99 MMOL/L (ref 98–107)
CO2 SERPL-SCNC: 24 MMOL/L (ref 21–32)
CREAT SERPL-MCNC: 1.1 MG/DL (ref 0.5–1.3)
EGFRCR SERPLBLD CKD-EPI 2021: 64 ML/MIN/1.73M*2
EOSINOPHIL # BLD AUTO: 0.16 X10*3/UL (ref 0–0.4)
EOSINOPHIL NFR BLD AUTO: 2.5 %
ERYTHROCYTE [DISTWIDTH] IN BLOOD BY AUTOMATED COUNT: 15.8 % (ref 11.5–14.5)
FLUAV RNA RESP QL NAA+PROBE: NOT DETECTED
FLUBV RNA RESP QL NAA+PROBE: NOT DETECTED
GLUCOSE BLD MANUAL STRIP-MCNC: 151 MG/DL (ref 74–99)
GLUCOSE BLD MANUAL STRIP-MCNC: 171 MG/DL (ref 74–99)
GLUCOSE BLDV-MCNC: 166 MG/DL (ref 74–99)
GLUCOSE SERPL-MCNC: 160 MG/DL (ref 74–99)
HCO3 BLDV-SCNC: 29.5 MMOL/L (ref 22–26)
HCT VFR BLD AUTO: 31.4 % (ref 41–52)
HCT VFR BLD EST: 33 % (ref 41–52)
HGB BLD-MCNC: 10.4 G/DL (ref 13.5–17.5)
HGB BLDV-MCNC: 11 G/DL (ref 13.5–17.5)
IMM GRANULOCYTES # BLD AUTO: 0.02 X10*3/UL (ref 0–0.5)
IMM GRANULOCYTES NFR BLD AUTO: 0.3 % (ref 0–0.9)
INHALED O2 CONCENTRATION: 36 %
INR PPP: 3 (ref 0.9–1.1)
LACTATE BLDV-SCNC: 1.8 MMOL/L (ref 0.4–2)
LYMPHOCYTES # BLD AUTO: 0.52 X10*3/UL (ref 0.8–3)
LYMPHOCYTES NFR BLD AUTO: 8 %
MAGNESIUM SERPL-MCNC: 1.86 MG/DL (ref 1.6–2.4)
MAGNESIUM SERPL-MCNC: 1.87 MG/DL (ref 1.6–2.4)
MCH RBC QN AUTO: 29.5 PG (ref 26–34)
MCHC RBC AUTO-ENTMCNC: 33.1 G/DL (ref 32–36)
MCV RBC AUTO: 89 FL (ref 80–100)
MONOCYTES # BLD AUTO: 0.75 X10*3/UL (ref 0.05–0.8)
MONOCYTES NFR BLD AUTO: 11.6 %
NEUTROPHILS # BLD AUTO: 5.01 X10*3/UL (ref 1.6–5.5)
NEUTROPHILS NFR BLD AUTO: 77.3 %
NRBC BLD-RTO: 0 /100 WBCS (ref 0–0)
OXYHGB MFR BLDV: 91 % (ref 45–75)
PCO2 BLDV: 51 MM HG (ref 41–51)
PH BLDV: 7.37 PH (ref 7.33–7.43)
PLATELET # BLD AUTO: 157 X10*3/UL (ref 150–450)
PO2 BLDV: 67 MM HG (ref 35–45)
POTASSIUM BLDV-SCNC: 4.8 MMOL/L (ref 3.5–5.3)
POTASSIUM SERPL-SCNC: 4.3 MMOL/L (ref 3.5–5.3)
PROT SERPL-MCNC: 6.7 G/DL (ref 6.4–8.2)
PROTHROMBIN TIME: 33.3 SECONDS (ref 9.8–12.4)
Q ONSET: 221 MS
QRS COUNT: 11 BEATS
QRS DURATION: 82 MS
QT INTERVAL: 392 MS
QTC CALCULATION(BAZETT): 404 MS
QTC FREDERICIA: 400 MS
R AXIS: 81 DEGREES
RBC # BLD AUTO: 3.52 X10*6/UL (ref 4.5–5.9)
RSV RNA RESP QL NAA+PROBE: NOT DETECTED
SAO2 % BLDV: 93 % (ref 45–75)
SARS-COV-2 RNA RESP QL NAA+PROBE: NOT DETECTED
SODIUM BLDV-SCNC: 132 MMOL/L (ref 136–145)
SODIUM SERPL-SCNC: 132 MMOL/L (ref 136–145)
T AXIS: 39 DEGREES
T OFFSET: 417 MS
VENTRICULAR RATE: 64 BPM
WBC # BLD AUTO: 6.5 X10*3/UL (ref 4.4–11.3)

## 2025-04-17 PROCEDURE — 82248 BILIRUBIN DIRECT: CPT | Performed by: EMERGENCY MEDICINE

## 2025-04-17 PROCEDURE — 83880 ASSAY OF NATRIURETIC PEPTIDE: CPT | Performed by: EMERGENCY MEDICINE

## 2025-04-17 PROCEDURE — 1200000002 HC GENERAL ROOM WITH TELEMETRY DAILY

## 2025-04-17 PROCEDURE — 71045 X-RAY EXAM CHEST 1 VIEW: CPT

## 2025-04-17 PROCEDURE — 99223 1ST HOSP IP/OBS HIGH 75: CPT | Performed by: INTERNAL MEDICINE

## 2025-04-17 PROCEDURE — 87637 SARSCOV2&INF A&B&RSV AMP PRB: CPT | Performed by: EMERGENCY MEDICINE

## 2025-04-17 PROCEDURE — 83735 ASSAY OF MAGNESIUM: CPT | Performed by: EMERGENCY MEDICINE

## 2025-04-17 PROCEDURE — 2500000004 HC RX 250 GENERAL PHARMACY W/ HCPCS (ALT 636 FOR OP/ED): Mod: JZ | Performed by: EMERGENCY MEDICINE

## 2025-04-17 PROCEDURE — 83735 ASSAY OF MAGNESIUM: CPT | Performed by: NURSE PRACTITIONER

## 2025-04-17 PROCEDURE — 71045 X-RAY EXAM CHEST 1 VIEW: CPT | Performed by: RADIOLOGY

## 2025-04-17 PROCEDURE — 36415 COLL VENOUS BLD VENIPUNCTURE: CPT | Performed by: EMERGENCY MEDICINE

## 2025-04-17 PROCEDURE — 84484 ASSAY OF TROPONIN QUANT: CPT | Performed by: EMERGENCY MEDICINE

## 2025-04-17 PROCEDURE — 2500000001 HC RX 250 WO HCPCS SELF ADMINISTERED DRUGS (ALT 637 FOR MEDICARE OP): Performed by: NURSE PRACTITIONER

## 2025-04-17 PROCEDURE — 82947 ASSAY GLUCOSE BLOOD QUANT: CPT

## 2025-04-17 PROCEDURE — 85610 PROTHROMBIN TIME: CPT | Performed by: EMERGENCY MEDICINE

## 2025-04-17 PROCEDURE — 2060000001 HC INTERMEDIATE ICU ROOM DAILY

## 2025-04-17 PROCEDURE — 84132 ASSAY OF SERUM POTASSIUM: CPT | Performed by: EMERGENCY MEDICINE

## 2025-04-17 PROCEDURE — 2500000004 HC RX 250 GENERAL PHARMACY W/ HCPCS (ALT 636 FOR OP/ED): Mod: JZ | Performed by: NURSE PRACTITIONER

## 2025-04-17 PROCEDURE — 93005 ELECTROCARDIOGRAM TRACING: CPT

## 2025-04-17 PROCEDURE — 99291 CRITICAL CARE FIRST HOUR: CPT | Performed by: EMERGENCY MEDICINE

## 2025-04-17 PROCEDURE — 85025 COMPLETE CBC W/AUTO DIFF WBC: CPT | Performed by: EMERGENCY MEDICINE

## 2025-04-17 RX ORDER — METOPROLOL TARTRATE 25 MG/1
12.5 TABLET, FILM COATED ORAL NIGHTLY
Status: DISCONTINUED | OUTPATIENT
Start: 2025-04-17 | End: 2025-04-18

## 2025-04-17 RX ORDER — POLYETHYLENE GLYCOL 3350 17 G/17G
17 POWDER, FOR SOLUTION ORAL DAILY PRN
Status: DISCONTINUED | OUTPATIENT
Start: 2025-04-17 | End: 2025-04-23 | Stop reason: HOSPADM

## 2025-04-17 RX ORDER — POTASSIUM CHLORIDE 20 MEQ/1
20 TABLET, EXTENDED RELEASE ORAL DAILY
Status: DISCONTINUED | OUTPATIENT
Start: 2025-04-18 | End: 2025-04-23 | Stop reason: HOSPADM

## 2025-04-17 RX ORDER — FUROSEMIDE 10 MG/ML
80 INJECTION INTRAMUSCULAR; INTRAVENOUS ONCE
Status: COMPLETED | OUTPATIENT
Start: 2025-04-17 | End: 2025-04-17

## 2025-04-17 RX ORDER — GUAIFENESIN/DEXTROMETHORPHAN 100-10MG/5
5 SYRUP ORAL EVERY 4 HOURS PRN
Status: DISCONTINUED | OUTPATIENT
Start: 2025-04-17 | End: 2025-04-23 | Stop reason: HOSPADM

## 2025-04-17 RX ORDER — DOXAZOSIN 2 MG/1
6 TABLET ORAL DAILY
Status: DISCONTINUED | OUTPATIENT
Start: 2025-04-18 | End: 2025-04-23 | Stop reason: HOSPADM

## 2025-04-17 RX ORDER — ALUMINUM HYDROXIDE, MAGNESIUM HYDROXIDE, AND SIMETHICONE 1200; 120; 1200 MG/30ML; MG/30ML; MG/30ML
30 SUSPENSION ORAL EVERY 6 HOURS PRN
Status: DISCONTINUED | OUTPATIENT
Start: 2025-04-17 | End: 2025-04-23 | Stop reason: HOSPADM

## 2025-04-17 RX ORDER — METFORMIN HYDROCHLORIDE 500 MG/1
500 TABLET, EXTENDED RELEASE ORAL DAILY
Status: DISCONTINUED | OUTPATIENT
Start: 2025-04-17 | End: 2025-04-23 | Stop reason: HOSPADM

## 2025-04-17 RX ORDER — DILTIAZEM HYDROCHLORIDE 120 MG/1
360 CAPSULE, COATED, EXTENDED RELEASE ORAL NIGHTLY
Status: DISCONTINUED | OUTPATIENT
Start: 2025-04-17 | End: 2025-04-20

## 2025-04-17 RX ORDER — ONDANSETRON 4 MG/1
4 TABLET, ORALLY DISINTEGRATING ORAL EVERY 6 HOURS PRN
Status: DISCONTINUED | OUTPATIENT
Start: 2025-04-17 | End: 2025-04-23 | Stop reason: HOSPADM

## 2025-04-17 RX ORDER — ACETAMINOPHEN 650 MG/1
650 SUPPOSITORY RECTAL EVERY 4 HOURS PRN
Status: DISCONTINUED | OUTPATIENT
Start: 2025-04-17 | End: 2025-04-23 | Stop reason: HOSPADM

## 2025-04-17 RX ORDER — METOPROLOL TARTRATE 25 MG/1
25 TABLET, FILM COATED ORAL DAILY
Status: DISCONTINUED | OUTPATIENT
Start: 2025-04-17 | End: 2025-04-20

## 2025-04-17 RX ORDER — INSULIN LISPRO 100 [IU]/ML
0-5 INJECTION, SOLUTION INTRAVENOUS; SUBCUTANEOUS
Status: DISCONTINUED | OUTPATIENT
Start: 2025-04-17 | End: 2025-04-23 | Stop reason: HOSPADM

## 2025-04-17 RX ORDER — WARFARIN SODIUM 5 MG/1
5 TABLET ORAL DAILY
Status: DISCONTINUED | OUTPATIENT
Start: 2025-04-17 | End: 2025-04-18

## 2025-04-17 RX ORDER — PANTOPRAZOLE SODIUM 40 MG/1
40 TABLET, DELAYED RELEASE ORAL
Status: DISCONTINUED | OUTPATIENT
Start: 2025-04-18 | End: 2025-04-23 | Stop reason: HOSPADM

## 2025-04-17 RX ORDER — MAGNESIUM SULFATE HEPTAHYDRATE 40 MG/ML
2 INJECTION, SOLUTION INTRAVENOUS ONCE
Status: COMPLETED | OUTPATIENT
Start: 2025-04-17 | End: 2025-04-17

## 2025-04-17 RX ORDER — ACETAMINOPHEN 325 MG/1
650 TABLET ORAL EVERY 4 HOURS PRN
Status: DISCONTINUED | OUTPATIENT
Start: 2025-04-17 | End: 2025-04-23 | Stop reason: HOSPADM

## 2025-04-17 RX ORDER — LISINOPRIL 10 MG/1
10 TABLET ORAL DAILY
Status: DISCONTINUED | OUTPATIENT
Start: 2025-04-18 | End: 2025-04-20

## 2025-04-17 RX ORDER — ONDANSETRON HYDROCHLORIDE 2 MG/ML
4 INJECTION, SOLUTION INTRAVENOUS EVERY 6 HOURS PRN
Status: DISCONTINUED | OUTPATIENT
Start: 2025-04-17 | End: 2025-04-23 | Stop reason: HOSPADM

## 2025-04-17 RX ORDER — FUROSEMIDE 40 MG/1
40 TABLET ORAL DAILY
Status: DISCONTINUED | OUTPATIENT
Start: 2025-04-18 | End: 2025-04-17

## 2025-04-17 RX ORDER — ACETAMINOPHEN 160 MG/5ML
650 SUSPENSION ORAL EVERY 4 HOURS PRN
Status: DISCONTINUED | OUTPATIENT
Start: 2025-04-17 | End: 2025-04-23 | Stop reason: HOSPADM

## 2025-04-17 RX ORDER — TALC
3 POWDER (GRAM) TOPICAL NIGHTLY PRN
Status: DISCONTINUED | OUTPATIENT
Start: 2025-04-17 | End: 2025-04-23 | Stop reason: HOSPADM

## 2025-04-17 RX ORDER — CHOLECALCIFEROL (VITAMIN D3) 25 MCG
50 TABLET ORAL DAILY
Status: DISCONTINUED | OUTPATIENT
Start: 2025-04-17 | End: 2025-04-23 | Stop reason: HOSPADM

## 2025-04-17 RX ORDER — FUROSEMIDE 10 MG/ML
40 INJECTION INTRAMUSCULAR; INTRAVENOUS
Status: DISCONTINUED | OUTPATIENT
Start: 2025-04-18 | End: 2025-04-19

## 2025-04-17 RX ORDER — ASPIRIN 81 MG/1
81 TABLET ORAL DAILY
Status: DISCONTINUED | OUTPATIENT
Start: 2025-04-18 | End: 2025-04-23 | Stop reason: HOSPADM

## 2025-04-17 RX ORDER — ATORVASTATIN CALCIUM 80 MG/1
80 TABLET, FILM COATED ORAL DAILY
Status: DISCONTINUED | OUTPATIENT
Start: 2025-04-18 | End: 2025-04-23 | Stop reason: HOSPADM

## 2025-04-17 RX ADMIN — MAGNESIUM SULFATE HEPTAHYDRATE 2 G: 40 INJECTION, SOLUTION INTRAVENOUS at 17:49

## 2025-04-17 RX ADMIN — METFORMIN ER 500 MG 500 MG: 500 TABLET ORAL at 20:36

## 2025-04-17 RX ADMIN — Medication 50 MCG: at 20:36

## 2025-04-17 RX ADMIN — FUROSEMIDE 80 MG: 10 INJECTION, SOLUTION INTRAMUSCULAR; INTRAVENOUS at 17:49

## 2025-04-17 SDOH — SOCIAL STABILITY: SOCIAL NETWORK: HOW OFTEN DO YOU ATTEND MEETINGS OF THE CLUBS OR ORGANIZATIONS YOU BELONG TO?: NEVER

## 2025-04-17 SDOH — SOCIAL STABILITY: SOCIAL NETWORK: IN A TYPICAL WEEK, HOW MANY TIMES DO YOU TALK ON THE PHONE WITH FAMILY, FRIENDS, OR NEIGHBORS?: NEVER

## 2025-04-17 SDOH — SOCIAL STABILITY: SOCIAL INSECURITY: ARE THERE ANY APPARENT SIGNS OF INJURIES/BEHAVIORS THAT COULD BE RELATED TO ABUSE/NEGLECT?: NO

## 2025-04-17 SDOH — SOCIAL STABILITY: SOCIAL INSECURITY: WITHIN THE LAST YEAR, HAVE YOU BEEN AFRAID OF YOUR PARTNER OR EX-PARTNER?: NO

## 2025-04-17 SDOH — HEALTH STABILITY: PHYSICAL HEALTH: ON AVERAGE, HOW MANY MINUTES DO YOU ENGAGE IN EXERCISE AT THIS LEVEL?: 30 MIN

## 2025-04-17 SDOH — ECONOMIC STABILITY: FOOD INSECURITY: WITHIN THE PAST 12 MONTHS, YOU WORRIED THAT YOUR FOOD WOULD RUN OUT BEFORE YOU GOT THE MONEY TO BUY MORE.: NEVER TRUE

## 2025-04-17 SDOH — HEALTH STABILITY: PHYSICAL HEALTH: ON AVERAGE, HOW MANY DAYS PER WEEK DO YOU ENGAGE IN MODERATE TO STRENUOUS EXERCISE (LIKE A BRISK WALK)?: 3 DAYS

## 2025-04-17 SDOH — SOCIAL STABILITY: SOCIAL INSECURITY: WITHIN THE LAST YEAR, HAVE YOU BEEN HUMILIATED OR EMOTIONALLY ABUSED IN OTHER WAYS BY YOUR PARTNER OR EX-PARTNER?: NO

## 2025-04-17 SDOH — SOCIAL STABILITY: SOCIAL INSECURITY: DO YOU FEEL ANYONE HAS EXPLOITED OR TAKEN ADVANTAGE OF YOU FINANCIALLY OR OF YOUR PERSONAL PROPERTY?: NO

## 2025-04-17 SDOH — SOCIAL STABILITY: SOCIAL INSECURITY: HAVE YOU HAD THOUGHTS OF HARMING ANYONE ELSE?: YES

## 2025-04-17 SDOH — ECONOMIC STABILITY: HOUSING INSECURITY: AT ANY TIME IN THE PAST 12 MONTHS, WERE YOU HOMELESS OR LIVING IN A SHELTER (INCLUDING NOW)?: NO

## 2025-04-17 SDOH — SOCIAL STABILITY: SOCIAL INSECURITY: POSSIBLE ABUSE REPORTED TO:: ADVOCATE

## 2025-04-17 SDOH — HEALTH STABILITY: MENTAL HEALTH
DO YOU FEEL STRESS - TENSE, RESTLESS, NERVOUS, OR ANXIOUS, OR UNABLE TO SLEEP AT NIGHT BECAUSE YOUR MIND IS TROUBLED ALL THE TIME - THESE DAYS?: NOT AT ALL

## 2025-04-17 SDOH — ECONOMIC STABILITY: FOOD INSECURITY: WITHIN THE PAST 12 MONTHS, THE FOOD YOU BOUGHT JUST DIDN'T LAST AND YOU DIDN'T HAVE MONEY TO GET MORE.: NEVER TRUE

## 2025-04-17 SDOH — SOCIAL STABILITY: SOCIAL NETWORK: HOW OFTEN DO YOU ATTEND CHURCH OR RELIGIOUS SERVICES?: NEVER

## 2025-04-17 SDOH — SOCIAL STABILITY: SOCIAL INSECURITY
WITHIN THE LAST YEAR, HAVE YOU BEEN KICKED, HIT, SLAPPED, OR OTHERWISE PHYSICALLY HURT BY YOUR PARTNER OR EX-PARTNER?: NO

## 2025-04-17 SDOH — SOCIAL STABILITY: SOCIAL INSECURITY: ARE YOU OR HAVE YOU BEEN THREATENED OR ABUSED PHYSICALLY, EMOTIONALLY, OR SEXUALLY BY ANYONE?: NO

## 2025-04-17 SDOH — ECONOMIC STABILITY: HOUSING INSECURITY: IN THE LAST 12 MONTHS, WAS THERE A TIME WHEN YOU WERE NOT ABLE TO PAY THE MORTGAGE OR RENT ON TIME?: NO

## 2025-04-17 SDOH — SOCIAL STABILITY: SOCIAL INSECURITY: DOES ANYONE TRY TO KEEP YOU FROM HAVING/CONTACTING OTHER FRIENDS OR DOING THINGS OUTSIDE YOUR HOME?: NO

## 2025-04-17 SDOH — SOCIAL STABILITY: SOCIAL INSECURITY
WITHIN THE LAST YEAR, HAVE YOU BEEN RAPED OR FORCED TO HAVE ANY KIND OF SEXUAL ACTIVITY BY YOUR PARTNER OR EX-PARTNER?: NO

## 2025-04-17 SDOH — HEALTH STABILITY: MENTAL HEALTH: EXPERIENCED ANY OF THE FOLLOWING LIFE EVENTS: OTHER (COMMENT)

## 2025-04-17 SDOH — SOCIAL STABILITY: SOCIAL INSECURITY: ABUSE: PEDIATRIC

## 2025-04-17 SDOH — SOCIAL STABILITY: SOCIAL NETWORK: HOW OFTEN DO YOU GET TOGETHER WITH FRIENDS OR RELATIVES?: NEVER

## 2025-04-17 SDOH — SOCIAL STABILITY: SOCIAL INSECURITY: WERE YOU ABLE TO COMPLETE ALL THE BEHAVIORAL HEALTH SCREENINGS?: YES

## 2025-04-17 SDOH — SOCIAL STABILITY: SOCIAL INSECURITY: HAVE YOU HAD ANY THOUGHTS OF HARMING ANYONE ELSE?: NO

## 2025-04-17 SDOH — SOCIAL STABILITY: SOCIAL INSECURITY
ASK PARENT OR GUARDIAN: ARE THERE TIMES WHEN YOU, YOUR CHILD(REN), OR ANY MEMBER OF YOUR HOUSEHOLD FEEL UNSAFE, HARMED, OR THREATENED AROUND PERSONS WITH WHOM YOU KNOW OR LIVE?: NO

## 2025-04-17 SDOH — SOCIAL STABILITY: SOCIAL INSECURITY: ARE YOU MARRIED, WIDOWED, DIVORCED, SEPARATED, NEVER MARRIED, OR LIVING WITH A PARTNER?: MARRIED

## 2025-04-17 SDOH — SOCIAL STABILITY: SOCIAL NETWORK
DO YOU BELONG TO ANY CLUBS OR ORGANIZATIONS SUCH AS CHURCH GROUPS, UNIONS, FRATERNAL OR ATHLETIC GROUPS, OR SCHOOL GROUPS?: NO

## 2025-04-17 SDOH — ECONOMIC STABILITY: FOOD INSECURITY: HOW HARD IS IT FOR YOU TO PAY FOR THE VERY BASICS LIKE FOOD, HOUSING, MEDICAL CARE, AND HEATING?: NOT VERY HARD

## 2025-04-17 SDOH — ECONOMIC STABILITY: TRANSPORTATION INSECURITY: IN THE PAST 12 MONTHS, HAS LACK OF TRANSPORTATION KEPT YOU FROM MEDICAL APPOINTMENTS OR FROM GETTING MEDICATIONS?: NO

## 2025-04-17 SDOH — SOCIAL STABILITY: SOCIAL INSECURITY: HAS ANYONE EVER THREATENED TO HURT YOUR FAMILY OR YOUR PETS?: NO

## 2025-04-17 SDOH — SOCIAL STABILITY: SOCIAL INSECURITY: DO YOU FEEL UNSAFE GOING BACK TO THE PLACE WHERE YOU ARE LIVING?: NO

## 2025-04-17 SDOH — ECONOMIC STABILITY: HOUSING INSECURITY: DO YOU FEEL UNSAFE GOING BACK TO THE PLACE WHERE YOU LIVE?: NO

## 2025-04-17 ASSESSMENT — ACTIVITIES OF DAILY LIVING (ADL)
WALKS IN HOME: INDEPENDENT
HEARING - LEFT EAR: HEARING AID
DRESSING YOURSELF: INDEPENDENT
ADEQUATE_TO_COMPLETE_ADL: YES
GROOMING: INDEPENDENT
GROOMING: INDEPENDENT
BATHING: INDEPENDENT
PATIENT'S MEMORY ADEQUATE TO SAFELY COMPLETE DAILY ACTIVITIES?: YES
ADEQUATE_TO_COMPLETE_ADL: YES
HEARING - RIGHT EAR: HEARING AID
BATHING: INDEPENDENT
WALKS IN HOME: INDEPENDENT
FEEDING YOURSELF: INDEPENDENT
JUDGMENT_ADEQUATE_SAFELY_COMPLETE_DAILY_ACTIVITIES: YES
DRESSING YOURSELF: INDEPENDENT
LACK_OF_TRANSPORTATION: NO
TOILETING: INDEPENDENT
FEEDING YOURSELF: INDEPENDENT
TOILETING: INDEPENDENT
JUDGMENT_ADEQUATE_SAFELY_COMPLETE_DAILY_ACTIVITIES: YES
PATIENT'S MEMORY ADEQUATE TO SAFELY COMPLETE DAILY ACTIVITIES?: YES
HEARING - LEFT EAR: HEARING AID
HEARING - RIGHT EAR: HEARING AID

## 2025-04-17 ASSESSMENT — LIFESTYLE VARIABLES
AUDIT-C TOTAL SCORE: 0
PRESCIPTION_ABUSE_PAST_12_MONTHS: NO
HOW OFTEN DO YOU HAVE 6 OR MORE DRINKS ON ONE OCCASION: NEVER
HOW OFTEN DO YOU HAVE A DRINK CONTAINING ALCOHOL: NEVER
SKIP TO QUESTIONS 9-10: 1
AUDIT-C TOTAL SCORE: 0
SUBSTANCE_ABUSE_PAST_12_MONTHS: NO
HOW MANY STANDARD DRINKS CONTAINING ALCOHOL DO YOU HAVE ON A TYPICAL DAY: PATIENT DOES NOT DRINK

## 2025-04-17 ASSESSMENT — PATIENT HEALTH QUESTIONNAIRE - PHQ9
2. FEELING DOWN, DEPRESSED OR HOPELESS: NOT AT ALL
SUM OF ALL RESPONSES TO PHQ9 QUESTIONS 1 & 2: 0
1. LITTLE INTEREST OR PLEASURE IN DOING THINGS: NOT AT ALL

## 2025-04-17 ASSESSMENT — PAIN SCALES - GENERAL
PAINLEVEL_OUTOF10: 0 - NO PAIN

## 2025-04-17 ASSESSMENT — COGNITIVE AND FUNCTIONAL STATUS - GENERAL
PERSONAL GROOMING: A LITTLE
HELP NEEDED FOR BATHING: A LITTLE
DRESSING REGULAR UPPER BODY CLOTHING: A LITTLE
PERSONAL GROOMING: A LITTLE
MOBILITY SCORE: 19
MOVING TO AND FROM BED TO CHAIR: A LITTLE
MOBILITY SCORE: 19
DRESSING REGULAR UPPER BODY CLOTHING: A LITTLE
DRESSING REGULAR LOWER BODY CLOTHING: A LITTLE
DAILY ACTIVITIY SCORE: 18
WALKING IN HOSPITAL ROOM: A LITTLE
PATIENT BASELINE BEDBOUND: NO
STANDING UP FROM CHAIR USING ARMS: A LITTLE
WALKING IN HOSPITAL ROOM: A LITTLE
CLIMB 3 TO 5 STEPS WITH RAILING: A LOT
MOVING TO AND FROM BED TO CHAIR: A LITTLE
DRESSING REGULAR UPPER BODY CLOTHING: A LITTLE
HELP NEEDED FOR BATHING: A LITTLE
CLIMB 3 TO 5 STEPS WITH RAILING: A LOT
HELP NEEDED FOR BATHING: A LITTLE
STANDING UP FROM CHAIR USING ARMS: A LITTLE
CLIMB 3 TO 5 STEPS WITH RAILING: A LOT
MOBILITY SCORE: 19
MOVING TO AND FROM BED TO CHAIR: A LITTLE
DRESSING REGULAR LOWER BODY CLOTHING: A LITTLE
DRESSING REGULAR LOWER BODY CLOTHING: A LITTLE
EATING MEALS: A LITTLE
WALKING IN HOSPITAL ROOM: A LITTLE
STANDING UP FROM CHAIR USING ARMS: A LITTLE
DAILY ACTIVITIY SCORE: 18
PERSONAL GROOMING: A LITTLE
EATING MEALS: A LITTLE
DAILY ACTIVITIY SCORE: 18
TOILETING: A LITTLE
EATING MEALS: A LITTLE
TOILETING: A LITTLE
TOILETING: A LITTLE

## 2025-04-17 ASSESSMENT — PAIN - FUNCTIONAL ASSESSMENT
PAIN_FUNCTIONAL_ASSESSMENT: 0-10

## 2025-04-17 NOTE — PROGRESS NOTES
Ariel Rodríguez is a 89 y.o. male admitted for shortness of breath. Pharmacy has been consulted for warfarin dosing and monitoring for Atrial Fibrillation/Atrial Flutter with goal INR of 2.0-3.0.     Home regimen   MON TUE WED THR FRI SAT SUN   Dose 5  mg 5  mg 5  mg 5  mg 5  mg 5  mg 5  mg   Total weekly dose: 35 mg  Source: [facility]    Labs  INR: 3.0  Hgb/Hct/Plt  Lab Results   Component Value Date    HGB 10.4 (L) 04/17/2025    HGB 10.7 (L) 04/02/2025    HGB 10.9 (L) 12/18/2024    HGB 10.9 (L) 11/06/2024    HGB 11.3 (L) 05/29/2024        Lab Results   Component Value Date    HCT 31.4 (L) 04/17/2025    HCT 34.2 (L) 04/02/2025    HCT 34.4 (L) 12/18/2024    HCT 33.6 (L) 11/06/2024    HCT 35.2 (L) 05/29/2024        Platelets   Date Value Ref Range Status   04/17/2025 157 150 - 450 x10*3/uL Final   11/06/2024 179 150 - 450 x10*3/uL Final   05/29/2024 162 150 - 450 x10*3/uL Final   01/16/2024 164 150 - 450 x10*3/uL Final     PLATELET COUNT   Date Value Ref Range Status   04/02/2025 163 140 - 400 Thousand/uL Final      Warfarin Therapy   Current regimen: resuming home reigmen  Bridging: None needed  Interacting medications: no interacting medications    Dosing History During Current Admission  Date 4/17   INR 3.0   Dose  (mg) HOLD     Assessment and Plan  Patient's INR of 3.0 today is Therapeutic. Hemoglobin/hematocrit/platelet stable  Warfarin inpatient plan: HOLD due to possible thoracentesis tomorrow  Monitor s/sx of bleeding including epistaxis, hematuria, unusual bruising, hemoptysis, hematochezia as well as s/sx of stroke including impaired speech, unilateral paralysis, blurry vision.  Pharmacy will continue to monitor the patient and adjust therapy as needed.    Thank you for the consult. Please do not hesitate to contact a pharmacist with any questions.    Tangela Ray, MeenaD

## 2025-04-17 NOTE — PROGRESS NOTES
Pharmacy Medication History     Source of Information: patient and wife bedside     Additional concerns with the patient's PTA list.    N/A  Notified Provider via Haiku : No    The following updates were made to the Prior to Admission medication list:     Medications ADDED:   N/A  Medications CHANGED:  N/A  Medications REMOVED:   N/A  Medications NOT TAKING:   Cyclobenzaprine 5 mg tab     Allergy reviewed : Yes    Meds 2 Beds : Yes    Outpatient pharmacy confirmed and updated in chart : Yes    Pharmacy name: [facility]    The list below reflectives the updated PTA list. Please review each medication in order reconciliation for additional clarification and justification.    Prior to Admission Medications   Prescriptions Last Dose   acetaminophen (Tylenol) 500 mg tablet 4/17/2025   Sig: Take 1 tablet (500 mg) by mouth every 8 hours if needed for moderate pain (4 - 6). Repeat every 4 to 6 hours as needed   aspirin 81 mg EC tablet 4/17/2025   Sig: Take 1 tablet (81 mg) by mouth once daily.   atorvastatin (Lipitor) 80 mg tablet 4/17/2025   Sig: TAKE 1 TABLET(80 MG) BY MOUTH EVERY DAY   cholecalciferol (Vitamin D-3) 50 mcg (2,000 unit) capsule 4/17/2025   Sig: Take 1 capsule (50 mcg) by mouth once daily.   cyclobenzaprine (Flexeril) 5 mg tablet More than a month   Sig: Take 1 tablet (5 mg) by mouth as needed at bedtime for muscle spasms (or Back Pain).   Patient not taking: Reported on 4/17/2025   dilTIAZem ER (Tiazac) 360 mg 24 hr capsule 4/16/2025 Evening   Sig: Take 1 capsule (360 mg) by mouth once daily at bedtime.   doxazosin (Cardura) 4 mg tablet 4/17/2025 Morning   Sig: Take 1.5 tablets (6 mg) by mouth once daily.   furosemide (Lasix) 20 mg tablet 4/17/2025 Morning   Sig: Take 2 tablets (40 mg) by mouth once daily.   glucosamine/chondr nelson A sod (OSTEO BI-FLEX ORAL) 4/16/2025 Noon   Sig: Take 2 tablets by mouth 1 (one) time each day. [facility]   loratadine (Claritin Liqui-Gel) 10 mg  capsule 4/16/2025   Sig: Take 1 capsule by mouth if needed each day.   metFORMIN  mg 24 hr tablet    Sig: TAKE 1 TABLET(500 MG) BY MOUTH DAILY   metOLazone (Zaroxolyn) 2.5 mg tablet Past Week   Sig: TAKE 1 TABLET(2.5 MG) BY MOUTH 1 TIME EVERY WEEK   metoprolol tartrate (Lopressor) 25 mg tablet    Sig: TAKE 1 TABLET BY MOUTH TWICE DAILY   Patient taking differently: Take 1 tablet (25 mg) by mouth 2 times a day. Patient taking 1 tablet during the day and a 1/2 tablet at night   multivitamin/iron/folic acid (CENTRUM ORAL) 4/16/2025 Noon   Sig: Take 1 tablet by mouth 1 (one) time each day. Centrum Adult Oral Tablet   nitroglycerin (Nitrostat) 0.4 mg SL tablet    Sig: Place 1 tablet (0.4 mg) under the tongue every 5 minutes if needed for chest pain.   pantoprazole (ProtoNix) 40 mg EC tablet 4/17/2025 Morning   Sig: TAKE 1 TABLET BY MOUTH EVERY MORNING 30 MINUTES BEFORE BREAKFAST   potassium chloride CR 20 mEq ER tablet 4/17/2025 Morning   Sig: Take 1 tablet (20 mEq) by mouth once daily.   ramipril (Altace) 5 mg capsule 4/17/2025 Morning   Sig: Take 1 capsule (5 mg) by mouth once daily.   warfarin (Coumadin) 5 mg tablet 4/16/2025 Evening   Sig: Take 1 tablet by mouth daily or as directed      Facility-Administered Medications: None       The list below reflectives the updated allergy list. Please review each documented allergy for additional clarification and justification.    No Known Allergies       04/17/25 at 1:24 PM - Carey Lund

## 2025-04-17 NOTE — ED PROVIDER NOTES
HPI   Chief Complaint   Patient presents with    Shortness of Breath       HPI: []  89-year-old male with history of A-fib on Coumadin CHF CAD diabetes dyslipidemia comes in with shortness of breath.  He denies any chest pain pressure heaviness fever chills nausea vomit diarrhea cough congestion incontinence seizures syncope or Nisky be no hematemesis melena or hematochezia, no hemoptysis.  No recent travel or hospitalization.  No home oxygen.    Past history: Hypertension, A-fib, CHF, CAD, diabetes, dyslipidemia  Social: Pat denies current tobacco alcohol or drug use.  REVIEW OF SYSTEMS:    GENERAL.: No weight loss, fatigue, anorexia, insomnia, fever.    EYES: No vision loss, double vision, drainage, eye pain.    ENT: No pharyngitis, dry mouth.    CARDIOPULMONARY: No chest pain, palpitations, syncope, near syncope.  Positive for shortness of breath, cough, hemoptysis.    GI: No abdominal pain, change in bowel habits, melena, hematemesis, hematochezia, nausea, vomiting, diarrhea.    : No discharge, dysuria, frequency, urgency, hematuria.    MS: No limb pain, joint pain, joint swelling.    SKIN: No rashes.    PSYCH: No depression, anxiety, suicidality, homicidality.    Review of systems is otherwise negative unless stated above or in history of present illness.  Social history, family history, allergies reviewed.  PHYSICAL EXAM:    GENERAL: Vitals noted, mild distress. Alert and oriented  x 3. Non-toxic.      EENT: TMs clear. Posterior oropharynx unremarkable. No meningismus. No LAD.     NECK: Supple. Nontender. No midline tenderness.     CARDIAC: Irregularly irregular rate and rhythm.  Grade 2 x 6 ejection systolic murmur best heard at the left lower sternal border, no rubs or gallops.  4 cm JVD    PULMONARY: Tachypneic, mild respiratory distress.  Left-sided crackles at the bases and right-sided decreased breath sound at the base with dullness to percussion consistent with a pleural effusion.  No wheezing or  rhonchi.    ABDOMEN: Soft, nonsurgical. Nontender. No peritoneal signs. Normoactive bowel sounds. No pulsatile masses.     EXTREMITIES: 2+ bilateral symmetric pitting peripheral edema. Negative Homans bilaterally, no cords.  Pulses diminished due to edema but palpable.    SKIN: No rash. Intact.     NEURO: No focal neurologic deficits, NIH score of 0. Cranial nerves normal as tested from II through XII.     MEDICAL DECISION MAKING:  EKG on my interpretation shows atrial fibrillation normal axis rate in the mid 60s with no acute ischemic changes.  CBC with differential chemistry LFTs are unremarkable troponin x 2 is negative BNP about 400 elevated chest x-ray shows a right pleural effusion with pulmonary vessel congestion and atelectasis.  Venous gas shows no hypercapnia either 3.0 therapeutic.    Treatment in the ED: IV established and a cardiac monitor given supplemental oxygen and IV Lasix.    ED course: Patient remained stable hemodynamic.    Impression: #1 CHF, #2 pleural effusion    Plan/MDM: 89-year-old male with a history of hypertension diabetes A-fib on Coumadin known pleural effusion comes in with shortness of breath which I think the combination of decompensated heart failure and worsening pleural effusion perhaps suspicion for pneumonia pericardial effusion tamponade sepsis or pulmonary embolism low.  Patient will be hospitalized for diuresis possible thoracentesis and further care.              Patient History   Medical History[1]  Surgical History[2]  Family History[3]  Social History[4]    Physical Exam   ED Triage Vitals   Temperature Heart Rate Respirations BP   04/17/25 1248 04/17/25 1230 04/17/25 1230 04/17/25 1230   36.9 °C (98.4 °F) 65 (!) 22 120/70      Pulse Ox Temp Source Heart Rate Source Patient Position   04/17/25 1230 04/17/25 1248 -- --   96 % Oral        BP Location FiO2 (%)     -- --             Physical Exam      ED Course & MDM   ED Course as of 04/17/25 1656   Thu Apr 17, 2025   1340  XR chest 1 view [MT]   1603 EKG shows A-fib with a controlled medic rate, troponin x 2 is negative, CBC with differential chemistry is unremarkable INR therapeutic chest ray shows a right pleural effusion with heart failure, patient was given IV Lasix and patient will be hospitalized for further care. [MT]      ED Course User Index  [MT] Garland Chandler MD         Diagnoses as of 04/17/25 1656   Biventricular congestive heart failure   Pleural effusion on right                 No data recorded                                 Medical Decision Making      Procedure  Critical Care    Performed by: Garland Chandler MD  Authorized by: Garland Chandler MD    Critical care provider statement:     Critical care time (minutes):  45    Critical care time was exclusive of:  Separately billable procedures and treating other patients    Critical care was necessary to treat or prevent imminent or life-threatening deterioration of the following conditions:  Cardiac failure and respiratory failure    Critical care was time spent personally by me on the following activities:  Blood draw for specimens, development of treatment plan with patient or surrogate, discussions with primary provider, evaluation of patient's response to treatment, examination of patient, ordering and review of laboratory studies, ordering and performing treatments and interventions, ordering and review of radiographic studies, pulse oximetry, re-evaluation of patient's condition and review of old charts    Care discussed with: admitting provider           Garland Chandler MD  04/17/25 1612       [1]   Past Medical History:  Diagnosis Date    Acute myocardial infarction, unspecified 11/09/2022    Acute myocardial infarction    Acute upper respiratory infection, unspecified 04/10/2018    Viral URI    Atherosclerotic heart disease of native coronary artery with unspecified angina pectoris 11/21/2022    Athscl heart disease of native cor art w unsp ang pctrs     Benign neoplasm of colon, unspecified 08/26/2016    Colonic adenoma    Encounter for screening for malignant neoplasm of colon 07/24/2014    Colon cancer screening    Hyperglycemia 01/26/2023    Hyponatremia 01/26/2023    Obesity, unspecified 10/28/2020    Class 1 obesity with body mass index (BMI) of 32.0 to 32.9 in adult    Obesity, unspecified 11/09/2022    Obesity, Class I, BMI 30.0-34.9 (see actual BMI)    Olecranon bursitis, unspecified elbow 02/19/2020    Olecranon bursitis    Osteoarthritis of knee, unspecified 10/03/2017    Osteoarthrosis of knee    Other fecal abnormalities 08/26/2016    Guaiac positive stools    Personal history of other diseases of the circulatory system 08/17/2016    History of angina pectoris    Personal history of other diseases of the musculoskeletal system and connective tissue     History of arthritis    Personal history of other diseases of the nervous system and sense organs     History of cataract    Personal history of other diseases of the respiratory system 04/10/2018    History of acute bronchitis    Personal history of other drug therapy 10/01/2019    History of influenza vaccination    Personal history of other endocrine, nutritional and metabolic disease 12/26/2018    History of vitamin D deficiency    Personal history of other endocrine, nutritional and metabolic disease 02/26/2015    History of hypokalemia    Personal history of other specified conditions     History of heartburn    Personal history of thrombophlebitis 12/29/2015    History of deep vein thrombophlebitis of lower extremity    Pure hypercholesterolemia, unspecified 01/02/2019    Pure hypercholesterolemia, unspecified    Supratherapeutic INR 06/11/2024    Unspecified atrial fibrillation (Multi) 01/03/2023    Atrial fibrillation    Unspecified injury of head, sequela 11/29/2019    CHI (closed head injury), sequela   [2]   Past Surgical History:  Procedure Laterality Date    APPENDECTOMY  01/20/2014     Appendectomy    CATARACT EXTRACTION  01/20/2014    Cataract Surgery    TONSILLECTOMY  01/20/2014    Tonsillectomy   [3]   Family History  Problem Relation Name Age of Onset    Coronary artery disease Mother      Heart failure Mother      Stomach cancer Father      Other (Brain Tumor) Sister      Heart failure Sister      Other (Myocardial Infarction) Sister          age 83    Heart failure Brother      Other (Malignant Neoplasm) Brother      Lung cancer Brother          Aurthur age 75    Prostate cancer Brother      Other (Mild Cognitive Impairment) Brother      Lung cancer Other Grandparent     Breast cancer Niece          Sister's daughter   [4]   Social History  Tobacco Use    Smoking status: Never    Smokeless tobacco: Never   Substance Use Topics    Alcohol use: Yes     Comment: rare glass of wine with pasta (3 per month)    Drug use: Never        Garland Chandler MD  04/17/25 0878

## 2025-04-17 NOTE — ED TRIAGE NOTES
Pt to ed via ems with c/o sob. Pt states the past 3 days he has been sob but was worst this morning. EKG obtained

## 2025-04-17 NOTE — Clinical Note
1.3 L clear yellow Fluid drained from Right Pleura.  Labs sent.  Pt VSS with no complaints at this time. Pt tolerated procedure well with no complications.  CXR done.  Report called to bedside nurse.

## 2025-04-17 NOTE — H&P
History Of Present Illness  Ariel Rodríguez is a 89 y.o. male presenting with shortness of breath and bilateral leg edema.  89-year-old gentleman with history of hypertension, chronic atrial fibrillation, previous coronary artery disease status post PCI of LAD in 2014, previous NSTEMI has chronic heart failure being followed by cardiology and receives Lasix daily and metolazone weekly and has been on Coumadin for atrial fibrillation with therapeutic INR of 3.0 on admission.  Patient does have a history of chronic right pleural effusion.  He is now admitted with increasing shortness of breath, orthopnea and acute hypoxic respiratory failure requiring oxygen and increasing bilateral leg edema, all features consistent with acute on chronic diastolic heart failure worsening.  On chest x-ray he has moderate size right pleural effusion and will definitely benefit by thoracocentesis however his INR is 3.0, will hold his Coumadin until INR is 1.5 and then he could have thoracocentesis.  I did discuss with him about switching him to Eliquis once INR is subtherapeutic.  He denies any chest pain or fever, does have cough which he said that he has a chronic cough which runs in his family.  He has never smoked.     Past Medical History  He has a past medical history of Acute myocardial infarction, unspecified (11/09/2022), Acute upper respiratory infection, unspecified (04/10/2018), Atherosclerotic heart disease of native coronary artery with unspecified angina pectoris (11/21/2022), Benign neoplasm of colon, unspecified (08/26/2016), Encounter for screening for malignant neoplasm of colon (07/24/2014), Hyperglycemia (01/26/2023), Hyponatremia (01/26/2023), Obesity, unspecified (10/28/2020), Obesity, unspecified (11/09/2022), Olecranon bursitis, unspecified elbow (02/19/2020), Osteoarthritis of knee, unspecified (10/03/2017), Other fecal abnormalities (08/26/2016), Personal history of other diseases of the circulatory system  (08/17/2016), Personal history of other diseases of the musculoskeletal system and connective tissue, Personal history of other diseases of the nervous system and sense organs, Personal history of other diseases of the respiratory system (04/10/2018), Personal history of other drug therapy (10/01/2019), Personal history of other endocrine, nutritional and metabolic disease (12/26/2018), Personal history of other endocrine, nutritional and metabolic disease (02/26/2015), Personal history of other specified conditions, Personal history of thrombophlebitis (12/29/2015), Pure hypercholesterolemia, unspecified (01/02/2019), Supratherapeutic INR (06/11/2024), Unspecified atrial fibrillation (Multi) (01/03/2023), and Unspecified injury of head, sequela (11/29/2019).    Surgical History  He has a past surgical history that includes Tonsillectomy (01/20/2014); Cataract extraction (01/20/2014); and Appendectomy (01/20/2014).     Social History  He reports that he has never smoked. He has never used smokeless tobacco. He reports current alcohol use. He reports that he does not use drugs.    Family History  Family History[1]     Allergies  Patient has no known allergies.    Review of Systems  COMPLETE REVIEW OF SYSTEMS:    GENERAL: No weight loss, malaise or fevers., SEE HPI  HEENT: Negative for frequent or significant headaches, No changes in hearing or vision, no nose bleeds or other nasal problems  NECK: Negative for lumps, goiter, pain and significant neck swelling  RESPIRATORY: Negative for cough, wheezing or shortness of breath.  CARDIOVASCULAR: Positive for increasing shortness of breath and leg edema, negative for chest pain, or palpitations.,  Does have a history of chronic atrial fibrillation  GI: Negative for abdominal discomfort, blood in stools or black stools or change in bowel habits  : No history of dysuria, frequency or incontinence  MUSCULOSKELETAL: Negative for joint pain or swelling, back pain or muscle  pain.  SKIN: Negative for lesions, rash, and itching.  PSYCH: Negative for sleep disturbance, mood disorder and recent psychosocial stressors.  HEMATOLOGY/LYMPHOLOGY: Negative for prolonged bleeding, bruising easily or swollen nodes.  ENDOCRINE: Negative for cold or heat intolerance, polyuria, polydipsia and goiter.  NEURO: No history of headaches, syncope, paralysis, seizures or tremors     Physical Exam  GENERAL: healthy, alert,  moderate distress, orthopnea, cooperative  SKIN: Skin color, texture, turgor normal. No rashes or lesions.  HEAD/SINUSES: No significant findings.  EYES: PERRLA and EOMI  EARS: external ears normal.  NOSE:  Septum midline.  OROPHARYNX: Lips, mucosa, and tongue normal. Teeth and gums normal. Oropharynx normal.  NECK: no jugulovenous distention, no carotid bruits, carotid pulse normal contour, supple  BACK: Back symmetric, Normal curvature, ROM normal, No CVAT.  LUNGS: Impaired percussion with decreased breath sounds right mid and lower lung fields posteriorly, few bibasilar crackles left greater than right.  CARDIAC: Atrial fibrillation with slow ventricular response, normal S1 and S2; no rubs, , or gallops, 2/6 systolic ejection murmur left sternal border, apical systolic murmur, 2+ lower extremity edema and no overt CHF.  ABDOMEN: Abdomen soft, non-tender. BS normal. No masses or organomegaly.  EXTREMITIES: Extremities normal. No deformities, 2+ edema, no clubbing or skin discoloration.  NEURO: Gait not examined, nonfocal  PULSES: 2+ radial, 2+ carotid  RECTAL: not done.        Last Recorded Vitals  /71 (BP Location: Left arm, Patient Position: Lying)   Pulse 79   Temp 37 °C (98.6 °F) (Skin)   Resp 24   Wt 77.1 kg (170 lb)   SpO2 90%     Relevant Results  Scheduled medications  Scheduled Medications[2]  Continuous medications  Continuous Medications[3]  PRN medications  PRN Medications[4]     Results for orders placed or performed during the hospital encounter of 04/17/25  (from the past 96 hours)   ECG 12 lead   Result Value Ref Range    Ventricular Rate 64 BPM    QRS Duration 82 ms    QT Interval 392 ms    QTC Calculation(Bazett) 404 ms    R Axis 81 degrees    T Axis 39 degrees    QRS Count 11 beats    Q Onset 221 ms    T Offset 417 ms    QTC Fredericia 400 ms   CBC and Auto Differential   Result Value Ref Range    WBC 6.5 4.4 - 11.3 x10*3/uL    nRBC 0.0 0.0 - 0.0 /100 WBCs    RBC 3.52 (L) 4.50 - 5.90 x10*6/uL    Hemoglobin 10.4 (L) 13.5 - 17.5 g/dL    Hematocrit 31.4 (L) 41.0 - 52.0 %    MCV 89 80 - 100 fL    MCH 29.5 26.0 - 34.0 pg    MCHC 33.1 32.0 - 36.0 g/dL    RDW 15.8 (H) 11.5 - 14.5 %    Platelets 157 150 - 450 x10*3/uL    Neutrophils % 77.3 40.0 - 80.0 %    Immature Granulocytes %, Automated 0.3 0.0 - 0.9 %    Lymphocytes % 8.0 13.0 - 44.0 %    Monocytes % 11.6 2.0 - 10.0 %    Eosinophils % 2.5 0.0 - 6.0 %    Basophils % 0.3 0.0 - 2.0 %    Neutrophils Absolute 5.01 1.60 - 5.50 x10*3/uL    Immature Granulocytes Absolute, Automated 0.02 0.00 - 0.50 x10*3/uL    Lymphocytes Absolute 0.52 (L) 0.80 - 3.00 x10*3/uL    Monocytes Absolute 0.75 0.05 - 0.80 x10*3/uL    Eosinophils Absolute 0.16 0.00 - 0.40 x10*3/uL    Basophils Absolute 0.02 0.00 - 0.10 x10*3/uL   Basic metabolic panel   Result Value Ref Range    Glucose 160 (H) 74 - 99 mg/dL    Sodium 132 (L) 136 - 145 mmol/L    Potassium 4.3 3.5 - 5.3 mmol/L    Chloride 99 98 - 107 mmol/L    Bicarbonate 24 21 - 32 mmol/L    Anion Gap 13 10 - 20 mmol/L    Urea Nitrogen 26 (H) 6 - 23 mg/dL    Creatinine 1.10 0.50 - 1.30 mg/dL    eGFR 64 >60 mL/min/1.73m*2    Calcium 8.7 8.6 - 10.3 mg/dL   Magnesium   Result Value Ref Range    Magnesium 1.86 1.60 - 2.40 mg/dL   Hepatic function panel   Result Value Ref Range    Albumin 3.6 3.4 - 5.0 g/dL    Bilirubin, Total 0.7 0.0 - 1.2 mg/dL    Bilirubin, Direct 0.3 0.0 - 0.3 mg/dL    Alkaline Phosphatase 85 33 - 136 U/L    ALT 14 10 - 52 U/L    AST 22 9 - 39 U/L    Total Protein 6.7 6.4 - 8.2 g/dL    Protime-INR   Result Value Ref Range    Protime 33.3 (H) 9.8 - 12.4 seconds    INR 3.0 (H) 0.9 - 1.1   B-Type Natriuretic Peptide   Result Value Ref Range     (H) 0 - 99 pg/mL   Blood Gas Venous Full Panel   Result Value Ref Range    POCT pH, Venous 7.37 7.33 - 7.43 pH    POCT pCO2, Venous 51 41 - 51 mm Hg    POCT pO2, Venous 67 (H) 35 - 45 mm Hg    POCT SO2, Venous 93 (H) 45 - 75 %    POCT Oxy Hemoglobin, Venous 91.0 (H) 45.0 - 75.0 %    POCT Hematocrit Calculated, Venous 33.0 (L) 41.0 - 52.0 %    POCT Sodium, Venous 132 (L) 136 - 145 mmol/L    POCT Potassium, Venous 4.8 3.5 - 5.3 mmol/L    POCT Chloride, Venous 98 98 - 107 mmol/L    POCT Ionized Calicum, Venous 1.22 1.10 - 1.33 mmol/L    POCT Glucose, Venous 166 (H) 74 - 99 mg/dL    POCT Lactate, Venous 1.8 0.4 - 2.0 mmol/L    POCT Base Excess, Venous 3.4 (H) -2.0 - 3.0 mmol/L    POCT HCO3 Calculated, Venous 29.5 (H) 22.0 - 26.0 mmol/L    POCT Hemoglobin, Venous 11.0 (L) 13.5 - 17.5 g/dL    POCT Anion Gap, Venous 9.0 (L) 10.0 - 25.0 mmol/L    Patient Temperature 37.0 degrees Celsius    FiO2 36 %   Sars-CoV-2 PCR   Result Value Ref Range    Coronavirus 2019, PCR Not Detected Not Detected   RSV PCR   Result Value Ref Range    RSV PCR Not Detected Not Detected   Influenza A, and B PCR   Result Value Ref Range    Flu A Result Not Detected Not Detected    Flu B Result Not Detected Not Detected   Troponin I, High Sensitivity, Initial   Result Value Ref Range    Troponin I, High Sensitivity 5 0 - 20 ng/L   Troponin, High Sensitivity, 1 Hour   Result Value Ref Range    Troponin I, High Sensitivity 5 0 - 20 ng/L   Magnesium   Result Value Ref Range    Magnesium 1.87 1.60 - 2.40 mg/dL   POCT GLUCOSE   Result Value Ref Range    POCT Glucose 151 (H) 74 - 99 mg/dL    ECG 12 lead  Result Date: 4/17/2025  Atrial fibrillation Low voltage QRS Abnormal ECG When compared with ECG of 27-NOV-2024 11:12, No significant change was found See ED provider note for full  interpretation and clinical correlation Confirmed by Lisa Fisher ([id number]) on 4/17/2025 2:02:32 PM    XR chest 1 view  Result Date: 4/17/2025  Interpreted By:  Denis Morales, STUDY: XR CHEST 1 VIEW  4/17/2025 12:50 pm   INDICATION: Signs/Symptoms:sob   COMPARISON: 11/13/2024   ACCESSION NUMBER(S): [id number]   ORDERING CLINICIAN: MARINA CASTANO   TECHNIQUE: A single AP portable radiograph of the chest was obtained.   FINDINGS: Multiple cardiac monitoring leads are seen over the chest.  A moderate-sized right-sided pleural effusion is present. Right basilar airspace consolidation is seen and may represent atelectasis and/or pneumonia. No pneumothorax is identified. The cardiac silhouette is within normal limits for size.       Right-sided pleural effusion and airspace consolidation, as above. Clinical correlation and continued follow-up until clearing is recommended.   MACRO: None.   Signed by: Denis Morales 4/17/2025 1:33 PM Dictation workstation:   [id number]      Assessment/Plan   Assessment & Plan  Biventricular congestive heart failure  89-year-old gentleman with known history of severe coronary artery disease status post PCI of LAD in 2014 and NSTEMI in 2022, history of chronic atrial fibrillation, hypertension, type 2 diabetes mellitus presents with increasing shortness of breath and leg edema, orthopnea and moderate-size increased right pleural effusion.  Patient has been taking his Lasix and metolazone weekly as prescribed as an outpatient.  His chest x-ray is consistent with right pleural effusion significantly larger than before and EKG shows atrial fibrillation with slow ventricular response and his BN peptide is elevated    1.  Acute on chronic diastolic heart failure with increasing right edema and moderate-sized right pleural effusion and acute hypoxic respiratory failure  Chest x-ray consistent with large right pleural effusion, consider thoracocentesis once INR is 1.5 or lower.  IV diuretics  received 80 mg tonight and will be switched to 40 mg IV twice daily, cardiology input appreciated.  Echocardiogram pending    2.  Chronic atrial fibrillation with slow ventricular response and blood pressures were rather low on admission therefore his diltiazem and metoprolol is being held, patient has been on Coumadin and INR is 3.0, will hold Coumadin.  Consider switching to Eliquis once INR is 1.5 or below    3.  Diabetes mellitus type 2 stable, continue with current regime of metformin and sliding scale insulin if necessary, most recent A1c was 6.4.    4.Previous history of NSTEMI and LAD PCI.     Reviewed all labs and imaging studies discussed the plan of treatment with patient and his daughter was present in the room, cardiology consultation appreciated recommendations reviewed.    Total time spent in examination counseling coordinating care for this patient was greater than 75 minutes.    Chon Lanza MD         [1]   Family History  Problem Relation Name Age of Onset    Coronary artery disease Mother      Heart failure Mother      Stomach cancer Father      Other (Brain Tumor) Sister      Heart failure Sister      Other (Myocardial Infarction) Sister          age 83    Heart failure Brother      Other (Malignant Neoplasm) Brother      Lung cancer Brother          Aurthur age 75    Prostate cancer Brother      Other (Mild Cognitive Impairment) Brother      Lung cancer Other Grandparent     Breast cancer Niece          Sister's daughter   [2] [START ON 4/18/2025] aspirin, 81 mg, oral, Daily  [START ON 4/18/2025] atorvastatin, 80 mg, oral, Daily  cholecalciferol, 50 mcg, oral, Daily  [Held by provider] dilTIAZem ER, 360 mg, oral, Nightly  [Held by provider] doxazosin, 6 mg, oral, Daily  [START ON 4/18/2025] furosemide, 40 mg, intravenous, BID  insulin lispro, 0-5 Units, subcutaneous, TID AC  [Held by provider] lisinopril, 10 mg, oral, Daily  magnesium sulfate, 2 g, intravenous, Once  metFORMIN XR, 500 mg,  oral, Daily  [Held by provider] metoprolol tartrate, 12.5 mg, oral, Nightly  [Held by provider] metoprolol tartrate, 25 mg, oral, Daily  [START ON 4/18/2025] pantoprazole, 40 mg, oral, Daily before breakfast  [START ON 4/18/2025] potassium chloride CR, 20 mEq, oral, Daily  [Held by provider] warfarin, 5 mg, oral, Daily  [3]    [4] PRN medications: acetaminophen **OR** acetaminophen **OR** acetaminophen, alum-mag hydroxide-simeth, dextromethorphan-guaifenesin, melatonin, ondansetron ODT **OR** ondansetron, polyethylene glycol

## 2025-04-17 NOTE — ED NOTES
"Report attempted x 1. 413 nurse states \" she needs 5 minutes and will call when ready for report\"     Jolene Flores RN  04/17/25 2293    "

## 2025-04-17 NOTE — CONSULTS
Inpatient consult to Cardiology  Consult performed by: FRANCIS Gonzalez  Consult ordered by: FRANCIS Juarez  Reason for consult: CHF      History Of Present Illness:    Ariel Rodríguez is a 89 y.o. male with past medical history significant for Severe coronary artery disease s/p prior PCI to proximal and distal LAD and  NSTEMI 8/2014 (medical management), HFpEF, permanent atrial fibrillation on Coumadin, hypertension, Hyperlipidemia, Diabetes mellitus, Right pleural effusion.  Presented with shortness of breath.  Cardiology is consulted for CHF.    Patient with known history of chronic right pleural effusion which has been managed with oral lasix and weekly metolazone. He also follows with pulmonary in outpatient setting. States he always has chronic dyspnea on exertion and lower extremity edema however notes that symptoms have worsen recently. He now reports dyspnea at rest and orthopnea. He has been compliant with his diuretics.  He is not on any oxygen at home.      EKG showed atrial fibrillation controlled ventricular response. Chest x-ray showed right sided pleural effusion and airspace consolidation. High sensitivity troponin 5/5 magnesium 1.86 BNP   466   K 4.3 Bun 26 creatinine 1.10 ALT 14 AST 22 WBC 6.5 hemoglobin 10.4 hematocrit 31.4 platelets 157 INR 3.0.  Initial vital signs temp 36.9 heart rate 65 RR 22 /70 pulse ox 96% on supplementa oxygen via nasal cannula.  He was ordered Lasix 80 mg IV push x 1. He is currently on 4 L NC     Patient follows with cardiologist Dr. Jovanny Ruggiero. Last seen 11/27/2024.  Patient also follows with outpatient cardiologist Dr. Tolentino for right pleural effusion.    Home CV meds  Aspirin 81 mg daily  Atorvastatin 80 mg daily  Diltiazem  mg daily  Metoprolol tartrate 25 mg twice daily  Coumadin 5 mg daily  Doxazosin 6 mg daily  Ramipril 5 mg daily  Furosemide 40 mg daily  Metolazone 2.5 mg weekly    As needed sublingual  "nitroglycerin       Last Recorded Vitals:  Vitals:    04/17/25 1400 04/17/25 1545 04/17/25 1600 04/17/25 1630   BP: 90/73   124/67   Pulse: 61 67 67 69   Resp: 18   (!) 26   Temp:       TempSrc:       SpO2: 95% 94% 95% 96%   Weight:       Height:           Last Labs:  LABS:  CMP:  Results from last 7 days   Lab Units 04/17/25  1248   SODIUM mmol/L 132*   POTASSIUM mmol/L 4.3   CHLORIDE mmol/L 99   CO2 mmol/L 24   ANION GAP mmol/L 13   BUN mg/dL 26*   CREATININE mg/dL 1.10   EGFR mL/min/1.73m*2 64   MAGNESIUM mg/dL 1.86   ALBUMIN g/dL 3.6   ALT U/L 14   AST U/L 22   BILIRUBIN TOTAL mg/dL 0.7     CBC:  Results from last 7 days   Lab Units 04/17/25  1248   WBC AUTO x10*3/uL 6.5   HEMOGLOBIN g/dL 10.4*   HEMATOCRIT % 31.4*   PLATELETS AUTO x10*3/uL 157   MCV fL 89     COAG:   Results from last 7 days   Lab Units 04/17/25  1248   INR  3.0*     ABO: No results found for: \"ABO\"  HEME/ENDO:     CARDIAC:   Results from last 7 days   Lab Units 04/17/25  1343 04/17/25  1248   TROPHS ng/L 5 5   BNP pg/mL  --  466*     Recent Labs     11/06/24  1134 01/16/24  1119 11/09/22  1117 07/06/22  0835 06/22/21  0815   CHOL 97 84 93 103 93   LDLF  --   --  33 38 30   LDLCALC 33 26  --   --   --    HDL 41.4 38.6 39.0* 42.4 38.9*   TRIG 115 98 105 113 120     [facility] Results  XR chest 1 view   Final Result   Right-sided pleural effusion and airspace consolidation, as above.   Clinical correlation and continued follow-up until clearing is   recommended.        MACRO:   None.        Signed by: Denis Morales 4/17/2025 1:33 PM   Dictation workstation:   [id number]            Last I/O:  No intake/output data recorded.    Past Cardiology Tests (Last 3 Years):  EKG:  ECG 12 lead 04/17/2025    Echo:  6/29/2023  1. Left ventricular systolic function is normal with a 60-65% estimated ejection fraction.  2. There is reduced right ventricular systolic function.  3. The left atrium is severely dilated.  4. RVSP within normal limits.  5. Pt is " bradycardic with HR in 40s bpm throughout the exam.  6. Compared with the prior exam from 9/15/2021 ( [place]) there are no significant changes.  7. The patient is in atrial fibrillation which may influence the estimate of left ventricular function and transvalvular flows.    9/15/2021   1. The left ventricular systolic function is normal with a 55-60% estimated ejection fraction.   2. Moderately increased left ventricular septal thickness.   3. The left atrium is severely dilated.      Cath:  [facility] 8/8/2014- Per cardiology note   cardiac catheterization that showed diffuse severe disease and a new 95% lesion in the distal left anterior descending artery. He had stents put in the proximal and distal left anterior descending artery     Stress Test:  No results found for this or any previous visit from the past 1095 days.    Cardiac Imaging:  No results found for this or any previous visit from the past 1095 days.      Past Medical History:  He has a past medical history of Acute myocardial infarction, unspecified (11/09/2022), Acute upper respiratory infection, unspecified (04/10/2018), Atherosclerotic heart disease of native coronary artery with unspecified angina pectoris (11/21/2022), Benign neoplasm of colon, unspecified (08/26/2016), Encounter for screening for malignant neoplasm of colon (07/24/2014), Hyperglycemia (01/26/2023), Hyponatremia (01/26/2023), Obesity, unspecified (10/28/2020), Obesity, unspecified (11/09/2022), Olecranon bursitis, unspecified elbow (02/19/2020), Osteoarthritis of knee, unspecified (10/03/2017), Other fecal abnormalities (08/26/2016), Personal history of other diseases of the circulatory system (08/17/2016), Personal history of other diseases of the musculoskeletal system and connective tissue, Personal history of other diseases of the nervous system and sense organs, Personal history of other diseases of the respiratory system (04/10/2018), Personal history of other drug therapy  (10/01/2019), Personal history of other endocrine, nutritional and metabolic disease (12/26/2018), Personal history of other endocrine, nutritional and metabolic disease (02/26/2015), Personal history of other specified conditions, Personal history of thrombophlebitis (12/29/2015), Pure hypercholesterolemia, unspecified (01/02/2019), Supratherapeutic INR (06/11/2024), Unspecified atrial fibrillation (Multi) (01/03/2023), and Unspecified injury of head, sequela (11/29/2019).    Past Surgical History:  He has a past surgical history that includes Tonsillectomy (01/20/2014); Cataract extraction (01/20/2014); and Appendectomy (01/20/2014).      Social History:  He reports that he has never smoked. He has never used smokeless tobacco. He reports current alcohol use. He reports that he does not use drugs.    Family History:  Family History[1]     Allergies:  Patient has no known allergies.    Inpatient Medications:  Scheduled Medications[2]  PRN Medications[3]  Continuous Medications[4]  Outpatient Medications:  Current Outpatient Medications   Medication Instructions    acetaminophen (Tylenol) 500 mg tablet 1 tablet, Every 8 hours PRN    aspirin 81 mg, Daily    atorvastatin (LIPITOR) 80 mg, oral, Daily    cholecalciferol (Vitamin D-3) 50 mcg (2,000 unit) capsule 1 capsule, Daily    cyclobenzaprine (Flexeril) 5 mg tablet 1 tablet, Nightly PRN    dilTIAZem ER (TIAZAC) 360 mg, oral, Nightly    doxazosin (CARDURA) 6 mg, oral, Daily    furosemide (LASIX) 40 mg, oral, Daily    glucosamine/chondr nelson A sod (OSTEO BI-FLEX ORAL) 2 tablets, Daily    loratadine (Claritin Liqui-Gel) 10 mg capsule 1 capsule, Daily PRN    metFORMIN XR (GLUCOPHAGE-XR) 500 mg, oral, Daily    metOLazone (Zaroxolyn) 2.5 mg tablet TAKE 1 TABLET(2.5 MG) BY MOUTH 1 TIME EVERY WEEK    metoprolol tartrate (LOPRESSOR) 25 mg, oral, 2 times daily    multivitamin/iron/folic acid (CENTRUM ORAL) 1 tablet, Daily    nitroglycerin (NITROSTAT) 0.4 mg, sublingual, Every 5  min PRN    pantoprazole (ProtoNix) 40 mg EC tablet TAKE 1 TABLET BY MOUTH EVERY MORNING 30 MINUTES BEFORE BREAKFAST    potassium chloride CR 20 mEq ER tablet 20 mEq, oral, Daily    ramipril (ALTACE) 5 mg, oral, Daily    warfarin (Coumadin) 5 mg tablet Take 1 tablet by mouth daily or as directed       Physical Exam:  GENERAL: alert, cooperative, pleasant, in no acute distress  SKIN: warm, dry  NECK: no JVD  CARDIAC: Irregularly irregular + murmur  PULMONARY: Normal respiratory efforts, Diminished breath sounds  ABDOMEN: soft, nondistended  EXTREMITIES: +1 lower extremity edema (reports this is chronic)  NEURO: Alert and oriented x 3.  Grossly normal.  Moves all 4 extremities.      Assessment/Plan   Ariel Rodríguez is a 89 y.o. male with past medical history significant for Severe coronary artery disease s/p prior PCI to proximal and distal LAD and  NSTEMI 8/2014 (medical management), HFpEF, permanent atrial fibrillation on Coumadin, hypertension, Hyperlipidemia, Diabetes mellitus, Right pleural effusion.  Presented with shortness of breath.  Cardiology is consulted for CHF.    Home CV meds: Aspirin 81 mg daily Atorvastatin 80 mg daily Diltiazem  mg daily Metoprolol tartrate 25 mg twice daily Coumadin 5 mg daily Doxazosin 6 mg daily Ramipril 5 mg daily Furosemide 40 mg daily Metolazone 2.5 mg weekly    #Acute hypoxic respiratory failure- Currently on 4 L NC (on no oxygen at home)   #Chronic right pleural effusion   #Acute on chronic HFpEF-    # Severe CAD s/p prior PCI to proximal and distal LAD and  NSTEMI 8/2014 (medical management)  #Permanent atrial fibrillation on Coumadin- rate controlled   #Hypertension  #Hyperlipidemia    Recommendations   Suspect symptoms more related to worsening right sided pleural effusion. Possible component for CHF exacerbation.   Lasix 80 mg IVP x 1 has been ordered then 40 mg bid starting tomorrow    Recommend holding Coumadin and consult IR for thoracentesis   Will  obtain updated echo     Code Status:  Full Code      Jermaine Neville, APRN-CNP    STAFF ADDENDUM:    Both the LILIANA and I have had a face to face encounter with the patient today. I have examined the patient and edited the documented physical examination as necessary.  I personally reviewed the patient's vital signs, telemetry, recent labs, medications, orders, EKGs, and pertinent cardiac imaging/ echocardiography.  I have reviewed the LILIANA's encounter note, approve the LILIANA's documentation and have edited the note to reflect my diagnostic and therapeutic plan.      Acute hypoxic respiratory failure-multifactorial in setting of chronic right-sided pleural effusion and acute on chronic likely diastolic heart failure.  Agree with above's recommendation for consider IR consult for thoracentesis.  Hold Coumadin for now.  In the meantime we will give more aggressive IV diuresis and get an updated echocardiogram.    Trino June DO           [1]   Family History  Problem Relation Name Age of Onset    Coronary artery disease Mother      Heart failure Mother      Stomach cancer Father      Other (Brain Tumor) Sister      Heart failure Sister      Other (Myocardial Infarction) Sister          age 83    Heart failure Brother      Other (Malignant Neoplasm) Brother      Lung cancer Brother          Aurthur age 75    Prostate cancer Brother      Other (Mild Cognitive Impairment) Brother      Lung cancer Other Grandparent     Breast cancer Niece          Sister's daughter   [2]   Scheduled medications   Medication Dose Route Frequency    [START ON 4/18/2025] aspirin  81 mg oral Daily    [START ON 4/18/2025] atorvastatin  80 mg oral Daily    cholecalciferol  50 mcg oral Daily    [Held by provider] dilTIAZem ER  360 mg oral Nightly    [Held by provider] doxazosin  6 mg oral Daily    [Held by provider] furosemide  40 mg oral Daily    [Held by provider] lisinopril  10 mg oral Daily    metFORMIN XR  500 mg oral Daily    metoprolol  tartrate  12.5 mg oral Nightly    metoprolol tartrate  25 mg oral Daily    [START ON 4/18/2025] pantoprazole  40 mg oral Daily before breakfast    [START ON 4/18/2025] potassium chloride CR  20 mEq oral Daily    warfarin  5 mg oral Daily   [3]   PRN medications   Medication    acetaminophen    Or    acetaminophen    Or    acetaminophen    alum-mag hydroxide-simeth    dextromethorphan-guaifenesin    melatonin    ondansetron ODT    Or    ondansetron    polyethylene glycol   [4]   Continuous Medications   Medication Dose Last Rate

## 2025-04-18 ENCOUNTER — APPOINTMENT (OUTPATIENT)
Dept: RADIOLOGY | Facility: HOSPITAL | Age: OVER 89
DRG: 291 | End: 2025-04-18
Payer: MEDICARE

## 2025-04-18 ENCOUNTER — APPOINTMENT (OUTPATIENT)
Dept: CARDIOLOGY | Facility: HOSPITAL | Age: OVER 89
DRG: 291 | End: 2025-04-18
Payer: MEDICARE

## 2025-04-18 LAB
ANION GAP SERPL CALC-SCNC: 11 MMOL/L (ref 10–20)
AORTIC VALVE MEAN GRADIENT: 12 MMHG
AORTIC VALVE PEAK VELOCITY: 2.27 M/S
AV PEAK GRADIENT: 21 MMHG
AVA (PEAK VEL): 1.71 CM2
AVA (VTI): 1.96 CM2
BASOPHILS NFR FLD MANUAL: 0 %
BLASTS NFR FLD MANUAL: 0 % (ref ?–0)
BUN SERPL-MCNC: 21 MG/DL (ref 6–23)
CALCIUM SERPL-MCNC: 8.7 MG/DL (ref 8.6–10.3)
CHLORIDE SERPL-SCNC: 94 MMOL/L (ref 98–107)
CLARITY FLD: ABNORMAL
CO2 SERPL-SCNC: 35 MMOL/L (ref 21–32)
COLOR FLD: YELLOW
CREAT SERPL-MCNC: 0.96 MG/DL (ref 0.5–1.3)
EGFRCR SERPLBLD CKD-EPI 2021: 75 ML/MIN/1.73M*2
EJECTION FRACTION APICAL 4 CHAMBER: 62.2
EJECTION FRACTION: 60 %
EOSINOPHIL NFR FLD MANUAL: 0 %
ERYTHROCYTE [DISTWIDTH] IN BLOOD BY AUTOMATED COUNT: 15.7 % (ref 11.5–14.5)
GLUCOSE BLD MANUAL STRIP-MCNC: 113 MG/DL (ref 74–99)
GLUCOSE BLD MANUAL STRIP-MCNC: 161 MG/DL (ref 74–99)
GLUCOSE BLD MANUAL STRIP-MCNC: 162 MG/DL (ref 74–99)
GLUCOSE SERPL-MCNC: 122 MG/DL (ref 74–99)
HCT VFR BLD AUTO: 31.8 % (ref 41–52)
HGB BLD-MCNC: 10.3 G/DL (ref 13.5–17.5)
HOLD SPECIMEN: NORMAL
IMMATURE GRANULOCYTES IN FLUID: 0 %
INR PPP: 2.6 (ref 0.9–1.1)
LDH FLD L TO P-CCNC: 105 U/L
LDH SERPL L TO P-CCNC: 153 U/L (ref 84–246)
LEFT ATRIUM VOLUME AREA LENGTH INDEX BSA: 88.1 ML/M2
LEFT VENTRICLE INTERNAL DIMENSION DIASTOLE: 4.29 CM (ref 3.5–6)
LEFT VENTRICULAR OUTFLOW TRACT DIAMETER: 2.29 CM
LYMPHOCYTES NFR FLD MANUAL: 26 %
MAGNESIUM SERPL-MCNC: 2.05 MG/DL (ref 1.6–2.4)
MCH RBC QN AUTO: 29.2 PG (ref 26–34)
MCHC RBC AUTO-ENTMCNC: 32.4 G/DL (ref 32–36)
MCV RBC AUTO: 90 FL (ref 80–100)
MONOS+MACROS NFR FLD MANUAL: 69 %
NEUTROPHILS NFR FLD MANUAL: 5 %
NRBC BLD-RTO: 0 /100 WBCS (ref 0–0)
OTHER CELLS NFR FLD MANUAL: 0 % (ref ?–0)
PLASMA CELLS NFR FLD MANUAL: 0 % (ref ?–0)
PLATELET # BLD AUTO: 164 X10*3/UL (ref 150–450)
POTASSIUM SERPL-SCNC: 3.6 MMOL/L (ref 3.5–5.3)
PROT FLD-MCNC: 4.7 G/DL
PROT SERPL-MCNC: 7.3 G/DL (ref 6.4–8.2)
PROTHROMBIN TIME: 29.3 SECONDS (ref 9.8–12.4)
RBC # BLD AUTO: 3.53 X10*6/UL (ref 4.5–5.9)
RBC # FLD AUTO: 3000 /UL
RIGHT VENTRICLE FREE WALL PEAK S': 16 CM/S
RIGHT VENTRICLE PEAK SYSTOLIC PRESSURE: 46.4 MMHG
SODIUM SERPL-SCNC: 136 MMOL/L (ref 136–145)
TOTAL CELLS COUNTED FLD: 100
TRICUSPID ANNULAR PLANE SYSTOLIC EXCURSION: 1.8 CM
WBC # BLD AUTO: 6.5 X10*3/UL (ref 4.4–11.3)
WBC # FLD AUTO: 267 /UL

## 2025-04-18 PROCEDURE — 2500000004 HC RX 250 GENERAL PHARMACY W/ HCPCS (ALT 636 FOR OP/ED): Mod: JZ

## 2025-04-18 PROCEDURE — 0W9930Z DRAINAGE OF RIGHT PLEURAL CAVITY WITH DRAINAGE DEVICE, PERCUTANEOUS APPROACH: ICD-10-PCS

## 2025-04-18 PROCEDURE — 2500000002 HC RX 250 W HCPCS SELF ADMINISTERED DRUGS (ALT 637 FOR MEDICARE OP, ALT 636 FOR OP/ED): Performed by: NURSE PRACTITIONER

## 2025-04-18 PROCEDURE — 83735 ASSAY OF MAGNESIUM: CPT | Performed by: NURSE PRACTITIONER

## 2025-04-18 PROCEDURE — 2500000004 HC RX 250 GENERAL PHARMACY W/ HCPCS (ALT 636 FOR OP/ED): Mod: JZ | Performed by: NURSE PRACTITIONER

## 2025-04-18 PROCEDURE — 2500000004 HC RX 250 GENERAL PHARMACY W/ HCPCS (ALT 636 FOR OP/ED): Mod: JZ | Performed by: INTERNAL MEDICINE

## 2025-04-18 PROCEDURE — 87116 MYCOBACTERIA CULTURE: CPT | Mod: AHULAB | Performed by: NURSE PRACTITIONER

## 2025-04-18 PROCEDURE — 2500000005 HC RX 250 GENERAL PHARMACY W/O HCPCS: Performed by: INTERNAL MEDICINE

## 2025-04-18 PROCEDURE — 93306 TTE W/DOPPLER COMPLETE: CPT

## 2025-04-18 PROCEDURE — 2720000007 HC OR 272 NO HCPCS

## 2025-04-18 PROCEDURE — 85610 PROTHROMBIN TIME: CPT

## 2025-04-18 PROCEDURE — 87070 CULTURE OTHR SPECIMN AEROBIC: CPT | Mod: AHULAB | Performed by: NURSE PRACTITIONER

## 2025-04-18 PROCEDURE — 99232 SBSQ HOSP IP/OBS MODERATE 35: CPT | Performed by: INTERNAL MEDICINE

## 2025-04-18 PROCEDURE — 93306 TTE W/DOPPLER COMPLETE: CPT | Performed by: INTERNAL MEDICINE

## 2025-04-18 PROCEDURE — 97161 PT EVAL LOW COMPLEX 20 MIN: CPT | Mod: GP

## 2025-04-18 PROCEDURE — 32555 ASPIRATE PLEURA W/ IMAGING: CPT

## 2025-04-18 PROCEDURE — 83615 LACTATE (LD) (LDH) ENZYME: CPT | Mod: AHULAB | Performed by: NURSE PRACTITIONER

## 2025-04-18 PROCEDURE — 71045 X-RAY EXAM CHEST 1 VIEW: CPT | Performed by: RADIOLOGY

## 2025-04-18 PROCEDURE — 87102 FUNGUS ISOLATION CULTURE: CPT | Mod: AHULAB | Performed by: NURSE PRACTITIONER

## 2025-04-18 PROCEDURE — 99233 SBSQ HOSP IP/OBS HIGH 50: CPT

## 2025-04-18 PROCEDURE — 88112 CYTOPATH CELL ENHANCE TECH: CPT | Mod: TC | Performed by: NURSE PRACTITIONER

## 2025-04-18 PROCEDURE — 82947 ASSAY GLUCOSE BLOOD QUANT: CPT

## 2025-04-18 PROCEDURE — 83986 ASSAY PH BODY FLUID NOS: CPT | Mod: AHULAB | Performed by: NURSE PRACTITIONER

## 2025-04-18 PROCEDURE — 94640 AIRWAY INHALATION TREATMENT: CPT

## 2025-04-18 PROCEDURE — 80048 BASIC METABOLIC PNL TOTAL CA: CPT | Performed by: NURSE PRACTITIONER

## 2025-04-18 PROCEDURE — 89051 BODY FLUID CELL COUNT: CPT | Performed by: NURSE PRACTITIONER

## 2025-04-18 PROCEDURE — 71045 X-RAY EXAM CHEST 1 VIEW: CPT

## 2025-04-18 PROCEDURE — 83615 LACTATE (LD) (LDH) ENZYME: CPT | Performed by: NURSE PRACTITIONER

## 2025-04-18 PROCEDURE — 84157 ASSAY OF PROTEIN OTHER: CPT | Mod: AHULAB | Performed by: NURSE PRACTITIONER

## 2025-04-18 PROCEDURE — 99231 SBSQ HOSP IP/OBS SF/LOW 25: CPT | Performed by: NURSE PRACTITIONER

## 2025-04-18 PROCEDURE — C1729 CATH, DRAINAGE: HCPCS

## 2025-04-18 PROCEDURE — 2060000001 HC INTERMEDIATE ICU ROOM DAILY

## 2025-04-18 PROCEDURE — 2500000002 HC RX 250 W HCPCS SELF ADMINISTERED DRUGS (ALT 637 FOR MEDICARE OP, ALT 636 FOR OP/ED): Performed by: INTERNAL MEDICINE

## 2025-04-18 PROCEDURE — 2500000001 HC RX 250 WO HCPCS SELF ADMINISTERED DRUGS (ALT 637 FOR MEDICARE OP): Performed by: NURSE PRACTITIONER

## 2025-04-18 PROCEDURE — 36415 COLL VENOUS BLD VENIPUNCTURE: CPT | Performed by: NURSE PRACTITIONER

## 2025-04-18 PROCEDURE — 85027 COMPLETE CBC AUTOMATED: CPT | Performed by: NURSE PRACTITIONER

## 2025-04-18 PROCEDURE — 84155 ASSAY OF PROTEIN SERUM: CPT | Performed by: NURSE PRACTITIONER

## 2025-04-18 RX ORDER — LIDOCAINE HYDROCHLORIDE 20 MG/ML
INJECTION, SOLUTION EPIDURAL; INFILTRATION; INTRACAUDAL; PERINEURAL
Status: COMPLETED | OUTPATIENT
Start: 2025-04-18 | End: 2025-04-18

## 2025-04-18 RX ORDER — IPRATROPIUM BROMIDE AND ALBUTEROL SULFATE 2.5; .5 MG/3ML; MG/3ML
3 SOLUTION RESPIRATORY (INHALATION)
Status: DISCONTINUED | OUTPATIENT
Start: 2025-04-18 | End: 2025-04-18

## 2025-04-18 RX ORDER — IPRATROPIUM BROMIDE AND ALBUTEROL SULFATE 2.5; .5 MG/3ML; MG/3ML
3 SOLUTION RESPIRATORY (INHALATION) EVERY 2 HOUR PRN
Status: DISCONTINUED | OUTPATIENT
Start: 2025-04-18 | End: 2025-04-20

## 2025-04-18 RX ORDER — METOPROLOL TARTRATE 25 MG/1
12.5 TABLET, FILM COATED ORAL NIGHTLY
Status: DISCONTINUED | OUTPATIENT
Start: 2025-04-18 | End: 2025-04-20

## 2025-04-18 RX ORDER — WARFARIN 2 MG/1
4 TABLET ORAL DAILY
Status: DISCONTINUED | OUTPATIENT
Start: 2025-04-18 | End: 2025-04-18

## 2025-04-18 RX ORDER — WARFARIN 3 MG/1
3 TABLET ORAL ONCE
Status: COMPLETED | OUTPATIENT
Start: 2025-04-18 | End: 2025-04-18

## 2025-04-18 RX ORDER — FUROSEMIDE 10 MG/ML
40 INJECTION INTRAMUSCULAR; INTRAVENOUS ONCE
Status: COMPLETED | OUTPATIENT
Start: 2025-04-18 | End: 2025-04-18

## 2025-04-18 RX ORDER — IPRATROPIUM BROMIDE AND ALBUTEROL SULFATE 2.5; .5 MG/3ML; MG/3ML
3 SOLUTION RESPIRATORY (INHALATION)
Status: DISCONTINUED | OUTPATIENT
Start: 2025-04-18 | End: 2025-04-20

## 2025-04-18 RX ADMIN — Medication 5 L/MIN: at 00:51

## 2025-04-18 RX ADMIN — IPRATROPIUM BROMIDE AND ALBUTEROL SULFATE 3 ML: 2.5; .5 SOLUTION RESPIRATORY (INHALATION) at 12:30

## 2025-04-18 RX ADMIN — Medication 97 PERCENT: at 20:40

## 2025-04-18 RX ADMIN — FUROSEMIDE 40 MG: 10 INJECTION, SOLUTION INTRAMUSCULAR; INTRAVENOUS at 00:41

## 2025-04-18 RX ADMIN — LIDOCAINE HYDROCHLORIDE 10 ML: 20 INJECTION, SOLUTION EPIDURAL; INFILTRATION; INTRACAUDAL; PERINEURAL at 11:01

## 2025-04-18 RX ADMIN — FUROSEMIDE 40 MG: 10 INJECTION, SOLUTION INTRAMUSCULAR; INTRAVENOUS at 09:31

## 2025-04-18 RX ADMIN — ASPIRIN 81 MG: 81 TABLET, COATED ORAL at 09:31

## 2025-04-18 RX ADMIN — PANTOPRAZOLE SODIUM 40 MG: 40 TABLET, DELAYED RELEASE ORAL at 06:14

## 2025-04-18 RX ADMIN — POTASSIUM CHLORIDE 20 MEQ: 1500 TABLET, EXTENDED RELEASE ORAL at 09:31

## 2025-04-18 RX ADMIN — IPRATROPIUM BROMIDE AND ALBUTEROL SULFATE 3 ML: 2.5; .5 SOLUTION RESPIRATORY (INHALATION) at 00:51

## 2025-04-18 RX ADMIN — ACETAMINOPHEN 650 MG: 325 TABLET, FILM COATED ORAL at 14:30

## 2025-04-18 RX ADMIN — ATORVASTATIN CALCIUM 80 MG: 80 TABLET, FILM COATED ORAL at 09:31

## 2025-04-18 RX ADMIN — INSULIN LISPRO 1 UNITS: 100 INJECTION, SOLUTION INTRAVENOUS; SUBCUTANEOUS at 17:00

## 2025-04-18 RX ADMIN — Medication 4 L/MIN: at 13:01

## 2025-04-18 RX ADMIN — IPRATROPIUM BROMIDE AND ALBUTEROL SULFATE 3 ML: 2.5; .5 SOLUTION RESPIRATORY (INHALATION) at 20:56

## 2025-04-18 RX ADMIN — WARFARIN SODIUM 3 MG: 3 TABLET ORAL at 18:27

## 2025-04-18 RX ADMIN — Medication 50 MCG: at 06:14

## 2025-04-18 RX ADMIN — FUROSEMIDE 40 MG: 10 INJECTION, SOLUTION INTRAMUSCULAR; INTRAVENOUS at 17:00

## 2025-04-18 RX ADMIN — METFORMIN ER 500 MG 500 MG: 500 TABLET ORAL at 09:31

## 2025-04-18 RX ADMIN — METOPROLOL TARTRATE 12.5 MG: 25 TABLET, FILM COATED ORAL at 18:30

## 2025-04-18 RX ADMIN — IPRATROPIUM BROMIDE AND ALBUTEROL SULFATE 3 ML: 2.5; .5 SOLUTION RESPIRATORY (INHALATION) at 07:47

## 2025-04-18 SDOH — ECONOMIC STABILITY: HOUSING INSECURITY: AT ANY TIME IN THE PAST 12 MONTHS, WERE YOU HOMELESS OR LIVING IN A SHELTER (INCLUDING NOW)?: NO

## 2025-04-18 SDOH — ECONOMIC STABILITY: HOUSING INSECURITY: IN THE LAST 12 MONTHS, WAS THERE A TIME WHEN YOU WERE NOT ABLE TO PAY THE MORTGAGE OR RENT ON TIME?: NO

## 2025-04-18 SDOH — ECONOMIC STABILITY: HOUSING INSECURITY: IN THE PAST 12 MONTHS, HOW MANY TIMES HAVE YOU MOVED WHERE YOU WERE LIVING?: 0

## 2025-04-18 SDOH — ECONOMIC STABILITY: FOOD INSECURITY: HOW HARD IS IT FOR YOU TO PAY FOR THE VERY BASICS LIKE FOOD, HOUSING, MEDICAL CARE, AND HEATING?: NOT HARD AT ALL

## 2025-04-18 SDOH — HEALTH STABILITY: PHYSICAL HEALTH: ON AVERAGE, HOW MANY MINUTES DO YOU ENGAGE IN EXERCISE AT THIS LEVEL?: 10 MIN

## 2025-04-18 SDOH — HEALTH STABILITY: PHYSICAL HEALTH: ON AVERAGE, HOW MANY DAYS PER WEEK DO YOU ENGAGE IN MODERATE TO STRENUOUS EXERCISE (LIKE A BRISK WALK)?: 5 DAYS

## 2025-04-18 SDOH — ECONOMIC STABILITY: TRANSPORTATION INSECURITY: IN THE PAST 12 MONTHS, HAS LACK OF TRANSPORTATION KEPT YOU FROM MEDICAL APPOINTMENTS OR FROM GETTING MEDICATIONS?: NO

## 2025-04-18 ASSESSMENT — COGNITIVE AND FUNCTIONAL STATUS - GENERAL
TURNING FROM BACK TO SIDE WHILE IN FLAT BAD: A LITTLE
WALKING IN HOSPITAL ROOM: A LITTLE
MOVING TO AND FROM BED TO CHAIR: A LITTLE
TOILETING: A LITTLE
TURNING FROM BACK TO SIDE WHILE IN FLAT BAD: A LITTLE
DRESSING REGULAR UPPER BODY CLOTHING: A LITTLE
MOVING TO AND FROM BED TO CHAIR: A LITTLE
DRESSING REGULAR LOWER BODY CLOTHING: A LITTLE
CLIMB 3 TO 5 STEPS WITH RAILING: A LOT
CLIMB 3 TO 5 STEPS WITH RAILING: TOTAL
HELP NEEDED FOR BATHING: A LITTLE
MOVING TO AND FROM BED TO CHAIR: A LITTLE
MOVING FROM LYING ON BACK TO SITTING ON SIDE OF FLAT BED WITH BEDRAILS: A LITTLE
MOBILITY SCORE: 16
CLIMB 3 TO 5 STEPS WITH RAILING: A LITTLE
STANDING UP FROM CHAIR USING ARMS: A LITTLE
STANDING UP FROM CHAIR USING ARMS: A LITTLE
EATING MEALS: A LITTLE
DAILY ACTIVITIY SCORE: 18
MOBILITY SCORE: 19
STANDING UP FROM CHAIR USING ARMS: A LITTLE
WALKING IN HOSPITAL ROOM: A LITTLE
PERSONAL GROOMING: A LITTLE
MOBILITY SCORE: 17
MOVING FROM LYING ON BACK TO SITTING ON SIDE OF FLAT BED WITH BEDRAILS: A LITTLE
WALKING IN HOSPITAL ROOM: A LOT

## 2025-04-18 ASSESSMENT — ACTIVITIES OF DAILY LIVING (ADL)
LACK_OF_TRANSPORTATION: NO
EFFECT OF PAIN ON DAILY ACTIVITIES: UNABLE TO COMPLETE ADL
LACK_OF_TRANSPORTATION: NO
EFFECT OF PAIN ON DAILY ACTIVITIES: UNABLE TO COMPLETE ADL
ADL_ASSISTANCE: INDEPENDENT

## 2025-04-18 ASSESSMENT — PAIN DESCRIPTION - LOCATION: LOCATION: BACK

## 2025-04-18 ASSESSMENT — PAIN DESCRIPTION - DESCRIPTORS
DESCRIPTORS: DISCOMFORT;ACHING
DESCRIPTORS: DISCOMFORT;ACHING

## 2025-04-18 ASSESSMENT — PAIN SCALES - GENERAL
PAINLEVEL_OUTOF10: 5 - MODERATE PAIN
PAINLEVEL_OUTOF10: 5 - MODERATE PAIN
PAINLEVEL_OUTOF10: 0 - NO PAIN

## 2025-04-18 ASSESSMENT — PAIN - FUNCTIONAL ASSESSMENT
PAIN_FUNCTIONAL_ASSESSMENT: 0-10

## 2025-04-18 ASSESSMENT — PAIN DESCRIPTION - ORIENTATION: ORIENTATION: RIGHT

## 2025-04-18 NOTE — PROGRESS NOTES
04/18/25 1540   Discharge Planning   Living Arrangements Spouse/significant other   Support Systems Spouse/significant other   Assistance Needed Independent, drives   Type of Residence Private residence   Number of Stairs to Enter Residence 3   Number of Stairs Within Residence 0   Do you have animals or pets at home? No   Who is requesting discharge planning? Provider   Home or Post Acute Services None   Expected Discharge Disposition Home   Does the patient need discharge transport arranged? Yes   RoundTrip coordination needed? Yes   Has discharge transport been arranged? No   Financial Resource Strain   How hard is it for you to pay for the very basics like food, housing, medical care, and heating? Not hard   Housing Stability   In the last 12 months, was there a time when you were not able to pay the mortgage or rent on time? N   In the past 12 months, how many times have you moved where you were living? 0   At any time in the past 12 months, were you homeless or living in a shelter (including now)? N   Transportation Needs   In the past 12 months, has lack of transportation kept you from medical appointments or from getting medications? no   In the past 12 months, has lack of transportation kept you from meetings, work, or from getting things needed for daily living? No   Patient Choice   Provider Choice list and CMS website (https://medicare.gov/care-compare#search) for post-acute Quality and Resource Measure Data were provided and reviewed with: Patient;Family   Patient / Family choosing to utilize agency / facility established prior to hospitalization No   Stroke Family Assessment   Stroke Family Assessment Needed No   Intensity of Service   Intensity of Service 0-30 min       Met with patient and his wife at bedside and explained my role as care coordinator. They live in the house. He is independent with his care at home. He drives. He uses a cane. No oxygen in use at home, no HD. His PCP is Dr. Carnes  Slade and he seen her 2 months ago. Pharmacy he uses is [email or web address] in [place]. He is able to afford medications and to get to his doctors appointments. Patient came in with increased SOB andhas CHF. He is diuresing with IV Lasix. He is on 5L NC. Patient had right thoracentesis done today and 1500 ml's of fluid was taken  out. Requested from doctor for PT/OT order. Patient denies any needs going home. Will continue to follow for discharge needs.   patient

## 2025-04-18 NOTE — PROGRESS NOTES
"Ariel Rodríguez is a 89 y.o. male who was referred to the Clinical Pharmacy Team to complete a Transitions of Care encounter for discharge medication optimization. The patient was referred for their T2DM and HFpEF.    Attending: Chon Lanza MD    PCP: Trice June DO    _______________________________________________________________________  PHARMACY ASSESSMENT    [facility]  Meds to beds? yes    Affordability/Accessibility: none reported  Adherence/Organization: none reported  Adverse Effects: none reported  _______________________________________________________________________  DIABETES ASSESSMENT    CURRENT PHARMACOTHERAPY  - Metformin 500mg XR QD    Additional Contributory Factors [medications, comorbidities]   - CHF    SECONDARY PREVENTION  - Statin? Atorvastatin 80mg every day   - ACE-I/ARB? Ramipril 5mg every day   - Aspirin? Aspirin 81mg every day     Lab Results   Component Value Date    HGBA1C 7.5 (H) 04/02/2025       Lab Results   Component Value Date    CHOL 97 11/06/2024    CHOL 84 01/16/2024    CHOL 93 11/09/2022     Lab Results   Component Value Date    HDL 41.4 11/06/2024    HDL 38.6 01/16/2024    HDL 39.0 (A) 11/09/2022     Lab Results   Component Value Date    LDLCALC 33 11/06/2024    LDLCALC 26 01/16/2024     Lab Results   Component Value Date    TRIG 115 11/06/2024    TRIG 98 01/16/2024    TRIG 105 11/09/2022     No components found for: \"CHOLHDL\"    _______________________________________________________________________  CHRONIC HEART FAILURE ASSESSMENT  -HTN present/diagnosed: yes    LVEF: 55-60%  eGFR: 75  Beta blocker: Metoprolol Tartrate 25mg - 1 in the day and 1/2 at night  ACEi/ARB/ARNI: none  MRA: Metolazone 2.5mg once a week  SGLT2i: none  Diuretic: Lasix 20mg take 2 every day   Advanced therapies: Diltiazem 360mg at bedtime  __________________________________________________________________  PATIENT EDUCATION/GOALS  - Discussed reluctance to start Jardiance " due to side effects.  - Pt may be switched to Eliquis from Warfarin, discussed benefits of Eliquis over Warfarin.  - Introduced post-discharge pharmacy team follow up and benefits of calls  - Answered all patient questions and concerns to best of my ability  _______________________________________________________________________  RECOMMENDATIONS/PLAN  Recommend increasing dose of Metformin for better diabetic control.  Continue all medications per medical team  Please send prescriptions to [facility] pharmacy for assistance on insurance prior authorization and copay. Prescriptions will be delivered to the patient's bedside prior to discharge with the Meds to Beds program.   Continuity of care will be provided by PCP and clinical pharmacy team      Catherine Galindo RPh     Verbal consent to manage patient's drug therapy was obtained from the patient. They were informed they may decline to participate or withdraw from participation in pharmacy services at any time.

## 2025-04-18 NOTE — PROGRESS NOTES
Physical Therapy    Physical Therapy Evaluation    Patient Name: Areil Rodríguez  MRN: [id number]  Department: [place] [id number]  Room: [address] [place] [address]  Today's Date: 4/18/2025   Time Calculation  Start Time: 1523  Stop Time: 1540  Time Calculation (min): 17 min    Assessment/Plan   PT Assessment  PT Assessment Results: Decreased strength, Decreased range of motion, Decreased endurance, Impaired balance, Decreased mobility, Pain, Impaired hearing  Rehab Prognosis: Good  Barriers to Discharge Home: Caregiver assistance, Physical needs  Caregiver Assistance: Caregiver assistance needed per identified barriers - however, level of patient's required assistance exceeds assistance available at home  Physical Needs: Stair navigation into home limited by function/safety, In-home setup navigation limited by function/safety, Ambulating household distances limited by function/safety, Intermittent mobility assistance needed, Intermittent ADL assistance needed, High falls risk due to function or environment  End of Session Communication: Bedside nurse  Assessment Comment: PT Evaluation Completed. The patient presented with decreased mobility, balance, endurance, strength, safety awareness, and increased pain and fatigue. These impairments are negatively impacting his ability to perform at baseline functional level, in home environment. The patient is at risk of falls & injury. This patient would benefit from skilled therapy intervention to address limitations and progress towards the PT goals.  Anticipate moderate frequency PT needs at discharge  End of Session Patient Position: Bed, 3 rail up, Alarm on  IP OR SWING BED PT PLAN  Inpatient or Swing Bed: Inpatient  PT Plan  Treatment/Interventions: Bed mobility, Transfer training, Gait training, Stair training, Balance training, Strengthening, Endurance training, Range of motion, Therapeutic activity, Therapeutic exercise, Home exercise program  PT Plan: Ongoing PT  PT Frequency: 3 times per  week  PT Discharge Recommendations: Moderate intensity level of continued care  PT Recommended Transfer Status: Assist x1  PT - OK to Discharge: Yes (PT POC established.)    Subjective   General Visit Information:  General  Reason for Referral: R pleural effusion with CHF, LE edema, afib.Thoracentesis completed 4/18  Referred By: DEBRA Juarez-CNP  Past Medical History Relevant to Rehab: Medical History[1]    Prior to Session Communication: Bedside nurse  Patient Position Received: Bed, 3 rail up, Alarm on  General Comment: Pt pleasant and agreeable to PT eval. Pt's wife present for session  Home Living:  Home Living  Type of Home: House  Lives With: Spouse  Home Adaptive Equipment: Cane, Walker rolling or standard (rollator)  Home Layout: Two level, Able to live on main level with bedroom/bathroom (rarely uses second floor)  Home Access: Stairs to enter with rails  Entrance Stairs-Number of Steps: 3  Bathroom Shower/Tub: Walk-in shower  Bathroom Toilet: Adaptive toilet seating  Bathroom Equipment: Raised toilet seat with rails  Prior Level of Function:  Prior Function Per Pt/Caregiver Report  Level of [place]: Independent with ADLs and functional transfers, Independent with homemaking with ambulation  ADL Assistance: Independent  Homemaking Assistance:  (folds laundry and does dishes, spouse completes other household tasks)  Ambulatory Assistance: Independent (using cane PRN for community distances)  Prior Function Comments: Denies any falls in the past 6 months.  Precautions:  Precautions  Hearing/Visual Limitations: hard of hearing  Medical Precautions: Fall precautions       Vital Signs Comment: HR monitored throughout session ranging from 105-130 with activity.     Objective   Pain:  Pain Assessment  Pain Assessment: 0-10 (Reports soreness at thoracentesis site but does not provide a rating)  Cognition:  Cognition  Overall Cognitive Status: Within Functional Limits  Orientation Level: Oriented  X4    General Assessments:  Activity Tolerance  Endurance: Tolerates less than 10 min exercise with changes in vital signs  Activity Tolerance Comments: HR elevation    Sensation  Sensation Comment: Carpal tunnel syndrome in pierre UEs         Postural Control  Postural Control: Within Functional Limits    Static Sitting Balance  Static Sitting-Balance Support: Feet supported, Bilateral upper extremity supported  Static Sitting-Level of Assistance: Close supervision  Dynamic Sitting Balance  Dynamic Sitting-Balance Support: Feet supported, Bilateral upper extremity supported  Dynamic Sitting-Level of Assistance: Close supervision    Static Standing Balance  Static Standing-Balance Support: Bilateral upper extremity supported  Static Standing-Level of Assistance: Contact guard  Dynamic Standing Balance  Dynamic Standing-Balance Support: Bilateral upper extremity supported  Dynamic Standing-Level of Assistance: Minimum assistance  Functional Assessments:  Bed Mobility  Bed Mobility: Yes  Bed Mobility 1  Bed Mobility 1: Supine to sitting  Level of Assistance 1: Close supervision  Bed Mobility Comments 1: HOB minimally elevated. Use of bed rails.    Transfers  Transfer: Yes  Transfer 1  Technique 1: Sit to stand, Stand to sit  Transfer Device 1: Walker  Transfer Level of Assistance 1: Minimum assistance  Trials/Comments 1: Cues for hand placement and backing up fully prior to sitting.    Ambulation/Gait Training  Ambulation/Gait Training Performed: Yes  Ambulation/Gait Training 1  Surface 1: Level tile  Device 1: Rolling walker  Assistance 1: Minimum assistance  Comments/Distance (ft) 1: 15 ft. Pt ambulates with forward flexed posture, decreased rukhsana, and step length. Demonstrates increased SOB and HR with ambulation. Pt reports fatigue post ambulation.  Extremity/Trunk Assessments:  RUE   RUE : Within Functional Limits  LUE   LUE: Within Functional Limits  RLE   RLE :  (Strength >3/5 based on observation of functional  mobility. ROM WFL.)  LLE   LLE :  (Strength >3/5 based on observation of functional mobility. ROM WFL.)  Outcome Measures:  [facility] Basic Mobility  Turning from your back to your side while in a flat bed without using bedrails: A little  Moving from lying on your back to sitting on the side of a flat bed without using bedrails: A little  Moving to and from bed to chair (including a wheelchair): A little  Standing up from a chair using your arms (e.g. wheelchair or bedside chair): A little  To walk in hospital room: A little  Climbing 3-5 steps with railing: Total  Basic Mobility - Total Score: 16    Encounter Problems       Encounter Problems (Active)       Balance       complete all mobility with normal balance while dual tasking, negotiating in a dynamic environment, carrying items, etc., with proactive and reactive static and dynamic standing and sitting tasks, with mod I and LRAD, >15 minutes.         Start:  04/18/25    Expected End:  05/02/25               Mobility       STG - Patient will ambulate 150 ft mod I using LRAD with stable vitals.        Start:  04/18/25    Expected End:  05/02/25            STG - Patient will ascend and descend four to six stairs       Start:  04/18/25               PT Transfers       STG - Patient will perform bed mobility independently.       Start:  04/18/25    Expected End:  05/02/25            STG - Patient will transfer sit to and from stand mod I with LRAD.        Start:  04/18/25    Expected End:  05/02/25               Pain - Adult              Education Documentation  Precautions, taught by Brenna Milton, PT at 4/18/2025  4:04 PM.  Learner: Patient  Readiness: Acceptance  Method: Explanation  Response: Verbalizes Understanding    Body Mechanics, taught by Brenna Milton, PT at 4/18/2025  4:04 PM.  Learner: Patient  Readiness: Acceptance  Method: Explanation  Response: Verbalizes Understanding    Mobility Training, taught by Brenna Milton, PT at 4/18/2025  4:04  PM.  Learner: Patient  Readiness: Acceptance  Method: Explanation  Response: Verbalizes Understanding    Education Comments  No comments found.                 [1]   Past Medical History:  Diagnosis Date    Acute myocardial infarction, unspecified 11/09/2022    Acute myocardial infarction    Acute upper respiratory infection, unspecified 04/10/2018    Viral URI    Atherosclerotic heart disease of native coronary artery with unspecified angina pectoris 11/21/2022    Athscl heart disease of native cor art w unsp ang pctrs    Benign neoplasm of colon, unspecified 08/26/2016    Colonic adenoma    Encounter for screening for malignant neoplasm of colon 07/24/2014    Colon cancer screening    Hyperglycemia 01/26/2023    Hyponatremia 01/26/2023    Obesity, unspecified 10/28/2020    Class 1 obesity with body mass index (BMI) of 32.0 to 32.9 in adult    Obesity, unspecified 11/09/2022    Obesity, Class I, BMI 30.0-34.9 (see actual BMI)    Olecranon bursitis, unspecified elbow 02/19/2020    Olecranon bursitis    Osteoarthritis of knee, unspecified 10/03/2017    Osteoarthrosis of knee    Other fecal abnormalities 08/26/2016    Guaiac positive stools    Personal history of other diseases of the circulatory system 08/17/2016    History of angina pectoris    Personal history of other diseases of the musculoskeletal system and connective tissue     History of arthritis    Personal history of other diseases of the nervous system and sense organs     History of cataract    Personal history of other diseases of the respiratory system 04/10/2018    History of acute bronchitis    Personal history of other drug therapy 10/01/2019    History of influenza vaccination    Personal history of other endocrine, nutritional and metabolic disease 12/26/2018    History of vitamin D deficiency    Personal history of other endocrine, nutritional and metabolic disease 02/26/2015    History of hypokalemia    Personal history of other specified  conditions     History of heartburn    Personal history of thrombophlebitis 12/29/2015    History of deep vein thrombophlebitis of lower extremity    Pure hypercholesterolemia, unspecified 01/02/2019    Pure hypercholesterolemia, unspecified    Supratherapeutic INR 06/11/2024    Unspecified atrial fibrillation (Multi) 01/03/2023    Atrial fibrillation    Unspecified injury of head, sequela 11/29/2019    CHI (closed head injury), sequela

## 2025-04-18 NOTE — POST-PROCEDURE NOTE
Interventional Radiology Brief Postprocedure Note    Attending: Alex Tabares CNP    Assistant: Courtney Kehr, PA-C student    Diagnosis: right sided pleural effusion    Description of procedure: Ultrasound guided thoracentesis was preformed on the right.  1,300 mL of straw-yellow fluid was drained.       Anesthesia:  Local    Complications: None    Estimated Blood Loss: none    Medications  As of 04/18/25 1114      aspirin EC tablet 81 mg (mg) Total dose:  81 mg Dosing weight:  77.1      Date/Time Rate/Dose/Volume Action       04/18/25  0931 81 mg Given               atorvastatin (Lipitor) tablet 80 mg (mg) Total dose:  80 mg      Date/Time Rate/Dose/Volume Action       04/18/25 0931 80 mg Given               cholecalciferol (Vitamin D-3) tablet 50 mcg (mcg) Total dose:  100 mcg      Date/Time Rate/Dose/Volume Action       04/17/25 2036 50 mcg Given     04/18/25 0614 50 mcg Given               dilTIAZem CD (Cardizem CD) 24 hr capsule 360 mg (mg) Total dose:  Cannot be calculated*   *Administration dose not documented     Date/Time Rate/Dose/Volume Action       04/17/25  1624 *Not included in total Held by provider      2100 *360 mg Missed               doxazosin (Cardura) tablet 6 mg (mg) Total dose:  Cannot be calculated*   *Administration dose not documented     Date/Time Rate/Dose/Volume Action       04/17/25  1624 *Not included in total Held by provider     04/18/25  0900 *6 mg Missed               furosemide (Lasix) tablet 40 mg (mg) Total dose:  Cannot be calculated*   *Administration dose not documented     Date/Time Rate/Dose/Volume Action       04/17/25  1624 *Not included in total Held by provider      1732 *Not included in total Unheld by provider               metFORMIN XR (Glucophage-XR) 24 hr tablet 500 mg (mg) Total dose:  1,000 mg      Date/Time Rate/Dose/Volume Action       04/17/25 2036 500 mg Given     04/18/25 0931 500 mg Given               metoprolol tartrate (Lopressor) tablet 25 mg (mg)  Total dose:  Cannot be calculated* Dosing weight:  77.1   *Administration dose not documented     Date/Time Rate/Dose/Volume Action       04/17/25  1707 *Not included in total Held by provider      1733 *25 mg Missed     04/18/25  0900 *25 mg Missed               metoprolol tartrate (Lopressor) tablet 12.5 mg (mg) Total dose:  Cannot be calculated* Dosing weight:  77.1   *Administration dose not documented     Date/Time Rate/Dose/Volume Action       04/17/25  1707 *Not included in total Held by provider      2100 *12.5 mg Missed               potassium chloride CR (Klor-Con M20) ER tablet 20 mEq (mEq) Total dose:  20 mEq      Date/Time Rate/Dose/Volume Action       04/18/25 0931 20 mEq Given               lisinopril tablet 10 mg (mg) Total dose:  Cannot be calculated* Dosing weight:  77.1   *Administration dose not documented     Date/Time Rate/Dose/Volume Action       04/17/25  1624 *Not included in total Held by provider     04/18/25  0900 *10 mg Missed               pantoprazole (ProtoNix) EC tablet 40 mg (mg) Total dose:  40 mg      Date/Time Rate/Dose/Volume Action       04/18/25  0614 40 mg Given               warfarin (Coumadin) tablet 5 mg (mg) Total dose:  Cannot be calculated*   *Administration dose not documented     Date/Time Rate/Dose/Volume Action       04/17/25  1642 *Not included in total Held by provider      1800 *5 mg Missed               furosemide (Lasix) injection 80 mg (mg) Total dose:  80 mg Dosing weight:  77.1      Date/Time Rate/Dose/Volume Action       04/17/25  1749 80 mg Given               furosemide (Lasix) injection 40 mg (mg) Total dose:  40 mg Dosing weight:  77.1      Date/Time Rate/Dose/Volume Action       04/18/25  0931 40 mg Given               furosemide (Lasix) injection 40 mg (mg) Total dose:  40 mg Dosing weight:  77.1      Date/Time Rate/Dose/Volume Action       04/18/25  0041 40 mg Given               insulin lispro injection 0-5 Units (Units) Total dose:  Cannot be  calculated* Dosing weight:  77.1   *Administration dose not documented     Date/Time Rate/Dose/Volume Action       04/17/25  1739 *Not included in total Missed     04/18/25  0902 *Not included in total Missed               magnesium sulfate 2 g in sterile water for injection 50 mL (mL/hr) Total dose:  2.4 g* Dosing weight:  77.1   *From user-documented volume     Date/Time Rate/Dose/Volume Action       04/17/25  1749 2 g - 25 mL/hr (over 120 min) New Bag      2013  (over 120 min) Stopped               ipratropium-albuteroL (Duo-Neb) 0.5-2.5 mg/3 mL nebulizer solution 3 mL (mL) Total volume:  6 mL Dosing weight:  77.1      Date/Time Rate/Dose/Volume Action       04/18/25  0051 3 mL Given      0116 *3 mL Missed      0747 3 mL Given               oxygen (O2) therapy (L/min) Total volume:  Not documented* Dosing weight:  77.1   *Total volume has not been documented. View each administration to see the amount administered.     Date/Time Rate/Dose/Volume Action       04/18/25  0051 5 L/min Rate Verify Medical Gas      0747 4 L/min Rate Change Medical Gas               lidocaine PF (Xylocaine) 20 mg/mL (2 %) injection (mL) Total volume:  10 mL      Date/Time Rate/Dose/Volume Action       04/18/25  1101 10 mL Given                   Specimens collected      See detailed result report with images in PACS.    The patient tolerated the procedure well without incident or complication and is in stable condition.

## 2025-04-18 NOTE — PROGRESS NOTES
Subjective:  Seen earlier.   Frail and fatigued, reports some improvement from admission.   On 3 L NC (none at b/l).   Wife at bedside.     Additional information:  Underwent right-sided thoracentesis today, 1,300 mL of straw-yellow fluid was drained.     Vitals (Last 24 Hours):  Heart Rate:  []   Temp:  [36.9 °C (98.4 °F)-37.5 °C (99.5 °F)]   Resp:  [16-38]   BP: (115-146)/(69-86)   SpO2:  [90 %-99 %]     I have reviewed imaging reports and labs from this visit    PHYSICAL EXAM:  Constitutional: NAD, alert and cooperative  Eyes: no icterus  ENMT: mucous membranes moist, no lesions  Head/Neck: supple  Respiratory/Thorax: exp wheezing/crackles bilat, mildly labored breathing, no cough, on 3 L NC  Cardiovascular: irregular, +murmur   Gastrointestinal: ND/S/NT  : no Strong, no SP/flank discomfort  Musculoskeletal: no joint swelling, ROM intact  Extremities: 1-2+ BLE edema  Neurological: non-focal  Skin: warm and dry  Psych: calm, stable mood     MEDS:  Scheduled meds  Scheduled Medications[1]    Continuous meds  Continuous Medications[2]    PRN meds  PRN Medications[3]    ASSESSMENT/PLAN:  89 yo man with h/o CAD s/p PCI, NSTEMI 8/2014, HFpEF, A-Fib on Coumadin, HTN, HLD, DMT2, right pleural effusion, presented with shortness of breath. CXR with right-sided pleural effusion and airspace consolidation, BMP elevated 466.     Acute hypoxic respiratory failure   R pleural effusion s/p right-sided thoracentesis today, 1300 mL of straw-yellow fluid drained  Acute on chronic HFpEF   Small/mod pericardial effusion without evidence of tamponade   -continue IV lasix, monitor lytes and I&O  -cardiology following, TTE noted, EF 60%  -of note, he is on metolazone 2.5 mg once a week in addition to Lasix   -currently low suspicion for PNA, FU pleural fluid LDH/protein pending     A-Fib   -RVR 120s noted on tele, home dose metoprolol with holding parameters resumed; consider resuming cardizem based on BP   -warfarin resumed at 3  mg tonight     HTN  -holding BP meds given soft BP on admission    CAD s/p PCI  -continue ASA, statin, BB    DMT2 A1c 7.5  -continue metformin, corrective scale Insulin     VTE / GI prophylaxis   -on warfarin, PPI, bowel regimen in place     Discharge planning  -PT/OT     Discussed with Dr. Lanza and the interdisciplinary team     Loni Galo, APRN-CNP           [1] aspirin, 81 mg, oral, Daily  atorvastatin, 80 mg, oral, Daily  cholecalciferol, 50 mcg, oral, Daily  [Held by provider] dilTIAZem ER, 360 mg, oral, Nightly  [Held by provider] doxazosin, 6 mg, oral, Daily  furosemide, 40 mg, intravenous, BID  insulin lispro, 0-5 Units, subcutaneous, TID AC  ipratropium-albuteroL, 3 mL, nebulization, TID  [Held by provider] lisinopril, 10 mg, oral, Daily  metFORMIN XR, 500 mg, oral, Daily  [Held by provider] metoprolol tartrate, 12.5 mg, oral, Nightly  [Held by provider] metoprolol tartrate, 25 mg, oral, Daily  oxygen, , inhalation, Continuous - Inhalation  pantoprazole, 40 mg, oral, Daily before breakfast  potassium chloride CR, 20 mEq, oral, Daily  [Held by provider] warfarin, 5 mg, oral, Daily  [2]    [3] PRN medications: acetaminophen **OR** acetaminophen **OR** acetaminophen, alum-mag hydroxide-simeth, dextromethorphan-guaifenesin, ipratropium-albuteroL, melatonin, ondansetron ODT **OR** ondansetron, polyethylene glycol

## 2025-04-18 NOTE — PROGRESS NOTES
04/18/25 1536   [facility] Disability Status   Are you deaf or do you have serious difficulty hearing? N   Are you blind or do you have serious difficulty seeing, even when wearing glasses? N   Because of a physical, mental, or emotional condition, do you have serious difficulty concentrating, remembering, or making decisions? (5 years old or older) N   Do you have serious difficulty walking or climbing stairs? N   Do you have serious difficulty dressing or bathing? N   Because of a physical, mental, or emotional condition, do you have serious difficulty doing errands alone such as visiting the doctor? N

## 2025-04-18 NOTE — PROGRESS NOTES
"Ariel Rodríguez is a 89 y.o. male on day 1 of admission presenting with Biventricular congestive heart failure.    Subjective   Patient seen and examined, overnight diuresed with IV Lasix and this morning he had right thoracocentesis and 1300 cc of pleural fluid was removed, pleural fluid analysis is pending his breathing is improved significantly and his leg edema is improving and he diuresed good.  He was running rather low blood pressure therefore his metoprolol was held however pressures are coming up now and since his heart rate did increase to 110 bpm with chronic atrial fibrillation will resume his metoprolol and monitor closely.  Also seen by cardiology was in concurrence with the current treatment.  Echocardiogram was done today which revealed diastolic dysfunction normal left ventricular systolic function moderate to severe dilatation of both atria.       Objective     Physical Exam  GENERAL:  Alert, mild distress, cooperative  SKIN:  Skin color, texture, turgor normal. No rashes or lesions.  OROPHARYNX:  Lips, mucosa, and tongue are normal.Teeth and gums, normal. Oropharynx normal.  NECK:  No jugulovenous distention, No carotid bruits, Carotid pulse normal contour, Supple  LUNGS: Proved air exchange, status post right thoracocentesis and 1300 cc of pleural fluid was removed..  CARDIAC: Atrial fibrillation with variable rate around 110 bpm, normal S1 and S2; no rubs,  or gallops, 2/6 systolic ejection murmur left sternal border, CHF improving  ABDOMEN:  Abdomen soft, non-tender, BS normal, No masses or organomegaly  EXTREMETIES:  Extremities normal, no deformities, 1+edema,no clubbing NEURO:  Alert, oriented X 3, Non-focal. PULSES:  2+ radial, 2+ carotid    Last Recorded Vitals  Blood pressure 127/73, pulse 110, temperature 37.5 °C (99.5 °F), temperature source Temporal, resp. rate 18, height 1.6 m (5' 3\"), weight 77.1 kg (170 lb), SpO2 96%.  Intake/Output last 3 Shifts:  I/O last 3 completed " shifts:  In: 60 (0.8 mL/kg) [I.V.:60 (0.8 mL/kg)]  Out: 700 (9.1 mL/kg) [Urine:700 (0.3 mL/kg/hr)]  Weight: 77.1 kg     Relevant Results  Scheduled medications  Scheduled Medications[1]  Continuous medications  Continuous Medications[2]  PRN medications  PRN Medications[3]   Results for orders placed or performed during the hospital encounter of 04/17/25 (from the past 96 hours)   ECG 12 lead   Result Value Ref Range    Ventricular Rate 64 BPM    QRS Duration 82 ms    QT Interval 392 ms    QTC Calculation(Bazett) 404 ms    R Axis 81 degrees    T Axis 39 degrees    QRS Count 11 beats    Q Onset 221 ms    T Offset 417 ms    QTC Fredericia 400 ms   CBC and Auto Differential   Result Value Ref Range    WBC 6.5 4.4 - 11.3 x10*3/uL    nRBC 0.0 0.0 - 0.0 /100 WBCs    RBC 3.52 (L) 4.50 - 5.90 x10*6/uL    Hemoglobin 10.4 (L) 13.5 - 17.5 g/dL    Hematocrit 31.4 (L) 41.0 - 52.0 %    MCV 89 80 - 100 fL    MCH 29.5 26.0 - 34.0 pg    MCHC 33.1 32.0 - 36.0 g/dL    RDW 15.8 (H) 11.5 - 14.5 %    Platelets 157 150 - 450 x10*3/uL    Neutrophils % 77.3 40.0 - 80.0 %    Immature Granulocytes %, Automated 0.3 0.0 - 0.9 %    Lymphocytes % 8.0 13.0 - 44.0 %    Monocytes % 11.6 2.0 - 10.0 %    Eosinophils % 2.5 0.0 - 6.0 %    Basophils % 0.3 0.0 - 2.0 %    Neutrophils Absolute 5.01 1.60 - 5.50 x10*3/uL    Immature Granulocytes Absolute, Automated 0.02 0.00 - 0.50 x10*3/uL    Lymphocytes Absolute 0.52 (L) 0.80 - 3.00 x10*3/uL    Monocytes Absolute 0.75 0.05 - 0.80 x10*3/uL    Eosinophils Absolute 0.16 0.00 - 0.40 x10*3/uL    Basophils Absolute 0.02 0.00 - 0.10 x10*3/uL   Basic metabolic panel   Result Value Ref Range    Glucose 160 (H) 74 - 99 mg/dL    Sodium 132 (L) 136 - 145 mmol/L    Potassium 4.3 3.5 - 5.3 mmol/L    Chloride 99 98 - 107 mmol/L    Bicarbonate 24 21 - 32 mmol/L    Anion Gap 13 10 - 20 mmol/L    Urea Nitrogen 26 (H) 6 - 23 mg/dL    Creatinine 1.10 0.50 - 1.30 mg/dL    eGFR 64 >60 mL/min/1.73m*2    Calcium 8.7 8.6 - 10.3  mg/dL   Magnesium   Result Value Ref Range    Magnesium 1.86 1.60 - 2.40 mg/dL   Hepatic function panel   Result Value Ref Range    Albumin 3.6 3.4 - 5.0 g/dL    Bilirubin, Total 0.7 0.0 - 1.2 mg/dL    Bilirubin, Direct 0.3 0.0 - 0.3 mg/dL    Alkaline Phosphatase 85 33 - 136 U/L    ALT 14 10 - 52 U/L    AST 22 9 - 39 U/L    Total Protein 6.7 6.4 - 8.2 g/dL   Protime-INR   Result Value Ref Range    Protime 33.3 (H) 9.8 - 12.4 seconds    INR 3.0 (H) 0.9 - 1.1   B-Type Natriuretic Peptide   Result Value Ref Range     (H) 0 - 99 pg/mL   Blood Gas Venous Full Panel   Result Value Ref Range    POCT pH, Venous 7.37 7.33 - 7.43 pH    POCT pCO2, Venous 51 41 - 51 mm Hg    POCT pO2, Venous 67 (H) 35 - 45 mm Hg    POCT SO2, Venous 93 (H) 45 - 75 %    POCT Oxy Hemoglobin, Venous 91.0 (H) 45.0 - 75.0 %    POCT Hematocrit Calculated, Venous 33.0 (L) 41.0 - 52.0 %    POCT Sodium, Venous 132 (L) 136 - 145 mmol/L    POCT Potassium, Venous 4.8 3.5 - 5.3 mmol/L    POCT Chloride, Venous 98 98 - 107 mmol/L    POCT Ionized Calicum, Venous 1.22 1.10 - 1.33 mmol/L    POCT Glucose, Venous 166 (H) 74 - 99 mg/dL    POCT Lactate, Venous 1.8 0.4 - 2.0 mmol/L    POCT Base Excess, Venous 3.4 (H) -2.0 - 3.0 mmol/L    POCT HCO3 Calculated, Venous 29.5 (H) 22.0 - 26.0 mmol/L    POCT Hemoglobin, Venous 11.0 (L) 13.5 - 17.5 g/dL    POCT Anion Gap, Venous 9.0 (L) 10.0 - 25.0 mmol/L    Patient Temperature 37.0 degrees Celsius    FiO2 36 %   Sars-CoV-2 PCR   Result Value Ref Range    Coronavirus 2019, PCR Not Detected Not Detected   RSV PCR   Result Value Ref Range    RSV PCR Not Detected Not Detected   Influenza A, and B PCR   Result Value Ref Range    Flu A Result Not Detected Not Detected    Flu B Result Not Detected Not Detected   Troponin I, High Sensitivity, Initial   Result Value Ref Range    Troponin I, High Sensitivity 5 0 - 20 ng/L   Troponin, High Sensitivity, 1 Hour   Result Value Ref Range    Troponin I, High Sensitivity 5 0 - 20 ng/L    Magnesium   Result Value Ref Range    Magnesium 1.87 1.60 - 2.40 mg/dL   POCT GLUCOSE   Result Value Ref Range    POCT Glucose 151 (H) 74 - 99 mg/dL   POCT GLUCOSE   Result Value Ref Range    POCT Glucose 171 (H) 74 - 99 mg/dL   Basic metabolic panel   Result Value Ref Range    Glucose 122 (H) 74 - 99 mg/dL    Sodium 136 136 - 145 mmol/L    Potassium 3.6 3.5 - 5.3 mmol/L    Chloride 94 (L) 98 - 107 mmol/L    Bicarbonate 35 (H) 21 - 32 mmol/L    Anion Gap 11 10 - 20 mmol/L    Urea Nitrogen 21 6 - 23 mg/dL    Creatinine 0.96 0.50 - 1.30 mg/dL    eGFR 75 >60 mL/min/1.73m*2    Calcium 8.7 8.6 - 10.3 mg/dL   CBC   Result Value Ref Range    WBC 6.5 4.4 - 11.3 x10*3/uL    nRBC 0.0 0.0 - 0.0 /100 WBCs    RBC 3.53 (L) 4.50 - 5.90 x10*6/uL    Hemoglobin 10.3 (L) 13.5 - 17.5 g/dL    Hematocrit 31.8 (L) 41.0 - 52.0 %    MCV 90 80 - 100 fL    MCH 29.2 26.0 - 34.0 pg    MCHC 32.4 32.0 - 36.0 g/dL    RDW 15.7 (H) 11.5 - 14.5 %    Platelets 164 150 - 450 x10*3/uL   Magnesium   Result Value Ref Range    Magnesium 2.05 1.60 - 2.40 mg/dL   Protime-INR   Result Value Ref Range    Protime 29.3 (H) 9.8 - 12.4 seconds    INR 2.6 (H) 0.9 - 1.1   Lactate Dehydrogenase   Result Value Ref Range     84 - 246 U/L   Protein, Total   Result Value Ref Range    Total Protein 7.3 6.4 - 8.2 g/dL   Transthoracic Echo (TTE) Complete   Result Value Ref Range    AV pk sen 2.27 m/s    AV mn grad 12 mmHg    LVOT diam 2.29 cm    LA vol index A/L 88.1 ml/m2    Tricuspid annular plane systolic excursion 1.8 cm    LV EF 60 %    RV free wall pk S' 16.00 cm/s    LVIDd 4.29 cm    RVSP 46.4 mmHg    Aortic Valve Area by Continuity of VTI 1.96 cm2    Aortic Valve Area by Continuity of Peak Velocity 1.71 cm2    AV pk grad 21 mmHg    LV A4C EF 62.2    Body Fluid Cell Count   Result Value Ref Range    Color, Fluid Yellow Colorless, Straw, Yellow    Clarity, Fluid Hazy (A) Clear    WBC, Fluid 267 See Comment /uL    RBC, Fluid 3,000 see comment /uL   Body Fluid  Differential   Result Value Ref Range    Neutrophils %, Manual, Fluid 5 see comment %    Lymphocytes %, Manual, Fluid 26 see comment %    Mono/Macrophages %, Manual, Fluid 69 see comment %    Eosinophils %, Manual, Fluid 0 see comment %    Basophils %, Manual, Fluid 0 not established %    Immature Granulocytes %, Manual, Fluid 0 not established %    Blasts %, Manual, Fluid 0 not established %    Unclassified Cells %, Manual, Fluid 0 not established %    Plasma Cells %, Manual, Fluid 0 not established %    Total Cells Counted, Fluid 100    POCT GLUCOSE   Result Value Ref Range    POCT Glucose 113 (H) 74 - 99 mg/dL   POCT GLUCOSE   Result Value Ref Range    POCT Glucose 162 (H) 74 - 99 mg/dL    US thoracentesis  Result Date: 4/18/2025  Interpreted By:  Alex Tabares, STUDY: US THORACENTESIS4/18/20253:15 pm   INDICATION: Signs/Symptoms:right sided pleural effusion   COMPARISON: CXR 4/17/25   ACCESSION NUMBER(S): [id number]   ORDERING CLINICIAN: ROSEMARIE CHAVEZ   TECHNIQUE: INTERVENTIONALIST(S): Alex Tabares   CONSENT: The patient/patient's POA/next of kin was informed of the nature of the proposed procedure. The purposes, alternatives, risks, and benefits were explained and discussed. All questions were answered and consent was obtained.   SEDATION: None   MEDICATION/CONTRAST: 1% lidocaine was used to anesthetize subcutaneously.   TIME OUT: A time out was performed immediately prior to procedure start with the interventional team, correctly identifying the patient name, date of birth, MRN, procedure, anatomy (including marking of site and side), patient position, procedure consent form, relevant laboratory and imaging test results, antibiotic administration, safety precautions, and procedure-specific equipment needs.   FINDINGS: The patient was placed in the sitting position.   The pleural space was examined with grey scale ultrasound, and the most accessible fluid identified and marked for thoracentesis.   The skin  was prepped and draped in usual manner. Local anesthesia with lidocaine was administered and a right-sided thoracentesis was performed.  A 5 Irish One-Step Valved thoracentesis needle/catheter was then placed where marked. Approximately 1,300 mL of yellowish colored fluid was removed.  The needle/catheter was then withdrawn.   The patient tolerated the procedure well and there were no immediate complications. Specimen(s) sent to the laboratory for further evaluation, per the requesting team.       Uneventful thoracentesis, as detailed above. Right pleural space, 1,300 mL   I personally performed and/or directly supervised this study and was present for the entire procedure.   Performed and dictated at [facility].   Signed by: Alex Tabares 4/18/2025 3:15 PM Dictation workstation:   [id number]    Transthoracic Echo (TTE) Complete  Result Date: 4/18/2025   [facility], [address] [place] [address]              Tel [phone] and Fax [phone] TRANSTHORACIC ECHOCARDIOGRAM REPORT  Patient Name:       PHOENIX Campo Physician:    [id number] Arriaga DO Study Date:         4/18/2025           Ordering Provider:    [id number] ARRIAGA MRN/PID:            [id number]            Fellow: Accession#:         [id number]        Nurse: Date of Birth/Age:  11/22/1934 / 89     Sonographer:          Maria Fernanda Stockton RDCS                     years Gender assigned at  M                   Additional Staff: Birth: Height:             160.02 cm           Admit Date:           4/17/2025 Weight:             77.11 kg            Admission Status:     Inpatient -                                                               Routine BSA / BMI:          1.80 m2 / 30.11     Encounter#:           [id number]                     kg/m2 Blood Pressure:      131/78 mmHg         Department Location:  [facility]                                                               Invasive Study Type:    TRANSTHORACIC ECHO (TTE) COMPLETE Diagnosis/ICD: Heart failure, unspecified-I50.9; Abnormal electrocardiogram                [ECG] [EKG]-R94.31 Indication:    CHF, abn EKG, afib CPT Code:      Echo Complete w Full Doppler-[id number] Patient History: Pertinent History: HTN and A-Fib. Atrial flutter, bradycardia, unstable angina,                    NSTEMI. Study Detail: The following Echo studies were performed: 2D, M-Mode, Doppler and               color flow. Unable to obtain subcostal and poor subcostal window               view.  PHYSICIAN INTERPRETATION: Left Ventricle: Left ventricular ejection fraction is normal, calculated by Rodriguez's biplane at 60%. There are no regional left ventricular wall motion abnormalities. The left ventricular cavity size is normal. There is mildly increased septal and normal posterior left ventricular wall thickness. There is left ventricular concentric remodeling. Left ventricular diastolic filling cannot be determined due to atrial fibrillation/flutter. Left Atrium: The left atrial size is severely dilated. Right Ventricle: The right ventricle is normal in size. There is normal right ventricular global systolic function. Right Atrium: The right atrium is severely dilated. Aortic Valve: The aortic valve is trileaflet. The aortic valve dimensionless index is 0.48. There is no evidence of aortic valve regurgitation. The peak instantaneous gradient of the aortic valve is 21 mmHg. The mean gradient of the aortic valve is 12 mmHg. Mitral Valve: The mitral valve is normal in structure. The mitral valve spectral Doppler A-wave is absent, consistent with atrial fibrillation. There is trace mitral valve regurgitation. Tricuspid Valve: The tricuspid valve is structurally normal. There is trace tricuspid regurgitation. The Doppler estimated RVSP is mildly  elevated at 46.4 mmHg. Pulmonic Valve: The pulmonic valve is structurally normal. There is no indication of pulmonic valve regurgitation. Pericardium: Small to moderate pericardial effusion. Aorta: The aortic root is normal. Systemic Veins: The inferior vena cava was not well visualized. In comparison to the previous echocardiogram(s): Compared with study dated 6/28/2023, pericardial effusion has increased.  CONCLUSIONS:  1. Left ventricular ejection fraction is normal, calculated by Rodriguez's biplane at 60%.  2. The left atrial size is severely dilated.  3. The right atrium is severely dilated.  4. Small to moderate pericardial effusion.  5. Absent A-wave on MV spectral Doppler tracing, consistent with atrial fibrillation.  6. Mildly elevated right ventricular systolic pressure.  7. Compared with study dated 6/28/2023, pericardial effusion has increased. QUANTITATIVE DATA SUMMARY:  2D MEASUREMENTS:          Normal Ranges: LAs:             5.77 cm  (2.7-4.0cm) IVSd:            1.22 cm  (0.6-1.1cm) LVPWd:           0.98 cm  (0.6-1.1cm) LVIDd:           4.29 cm  (3.9-5.9cm) LVIDs:           2.89 cm LV Mass Index:   90 g/m2 LVEDV Index:     43 ml/m2 LV % FS          32.7 %  LEFT ATRIUM:                  Normal Ranges: LA Vol A4C:        141.3 ml   (22+/-6mL/m2) LA Vol A2C:        156.9 ml LA Vol BP:         159.1 ml LA Vol Index A4C:  78.3ml/m2 LA Vol Index A2C:  86.9 ml/m2 LA Vol Index BP:   88.1 ml/m2 LA Area A4C:       36.7 cm2 LA Area A2C:       36.2 cm2 LA Major Axis A4C: 8.1 cm LA Major Axis A2C: 7.1 cm LA Volume Index:   88.1 ml/m2 LA Vol A4C:        134.4 ml LA Vol A2C:        147.7 ml LA Vol Index BSA:  78.2 ml/m2  RIGHT ATRIUM:                 Normal Ranges: RA Vol A4C:        113.8 ml   (8.3-19.5ml) RA Vol Index A4C:  63.1 ml/m2 RA Area A4C:       30.4 cm2 RA Major Axis A4C: 6.9 cm  M-MODE MEASUREMENTS:         Normal Ranges: LAs:                 6.23 cm (2.7-4.0cm)  AORTA MEASUREMENTS:         Normal  Ranges: Ao Sinus, d:        3.10 cm (2.1-3.5cm) Ao STJ, d:          2.50 cm (1.7-3.4cm) Asc Ao, d:          3.20 cm (2.1-3.4cm)  LV SYSTOLIC FUNCTION:                      Normal Ranges: EF-A4C View:    62 % (>=55%) EF-A2C View:    57 % EF-Biplane:     60 % LV EF Reported: 60 %  AORTIC VALVE:                      Normal Ranges: AoV Vmax:                2.27 m/s  (<=1.7m/s) AoV Peak P.6 mmHg (<20mmHg) AoV Mean P.1 mmHg (1.7-11.5mmHg) LVOT Max Rex:            0.94 m/s  (<=1.1m/s) AoV VTI:                 39.15 cm  (18-25cm) LVOT VTI:                18.61 cm LVOT Diameter:           2.29 cm   (1.8-2.4cm) AoV Area, VTI:           1.96 cm2  (2.5-5.5cm2) AoV Area,Vmax:           1.71 cm2  (2.5-4.5cm2) AoV Dimensionless Index: 0.48  RIGHT VENTRICLE: RV Basal 4.50 cm RV Mid   3.80 cm RV Major 6.1 cm TAPSE:   18.0 mm RV s'    0.16 m/s  TRICUSPID VALVE/RVSP:          Normal Ranges: Peak TR Velocity:     3.30 m/s Est. RA Pressure:     3 mmHg RV Syst Pressure:     46 mmHg  (< 30mmHg)  PULMONIC VALVE:          Normal Ranges: PV Accel Time:  63 msec  (>120ms) PV Max Rex:     1.2 m/s  (0.6-0.9m/s) PV Max P.1 mmHg  AORTA: Asc Ao Diam 3.22 cm  [id number] Trino June DO Electronically signed on 2025 at 12:33:15 PM  ** Final **     XR chest 1 view  Result Date: 2025  Interpreted By:  Rainer Wyatt, STUDY: XR CHEST 1 VIEW;  2025 11:19 am   INDICATION: Signs/Symptoms:S/P Thora.   COMPARISON: Chest x-ray from 2025. CT scan chest from 2024.   ACCESSION NUMBER(S): [id number]   ORDERING CLINICIAN: DIEGO JONES   TECHNIQUE: Single AP portable view of the chest was obtained.   FINDINGS: MEDIASTINUM/ LUNGS/ MAGNO: Suboptimal level of inspiration. There is still a small to moderate-sized right pleural effusion, improved. There is persistent mild atelectasis at the base of the aerated right lung. No left pleural effusion. Scant atelectasis at the retrocardiac left lung base.  Cardiomegaly without vascular congestion. No pneumothorax. No tracheal deviation. No abnormal hilar fullness or gross mass on either side.   BONES: No lytic or blastic destructive bone lesion.   UPPER ABDOMEN: Grossly intact.       As above. Status post right thoracentesis. No pneumothorax.   MACRO: None   Signed by: Rainer Wyatt 4/18/2025 11:24 AM Dictation workstation:   [id number]    ECG 12 lead  Result Date: 4/17/2025  Atrial fibrillation Low voltage QRS Abnormal ECG When compared with ECG of 27-NOV-2024 11:12, No significant change was found See ED provider note for full interpretation and clinical correlation Confirmed by Lisa Fisher ([id number]) on 4/17/2025 2:02:32 PM    XR chest 1 view  Result Date: 4/17/2025  Interpreted By:  Denis Morales, STUDY: XR CHEST 1 VIEW  4/17/2025 12:50 pm   INDICATION: Signs/Symptoms:sob   COMPARISON: 11/13/2024   ACCESSION NUMBER(S): [id number]   ORDERING CLINICIAN: MARINA CASTANO   TECHNIQUE: A single AP portable radiograph of the chest was obtained.   FINDINGS: Multiple cardiac monitoring leads are seen over the chest.  A moderate-sized right-sided pleural effusion is present. Right basilar airspace consolidation is seen and may represent atelectasis and/or pneumonia. No pneumothorax is identified. The cardiac silhouette is within normal limits for size.       Right-sided pleural effusion and airspace consolidation, as above. Clinical correlation and continued follow-up until clearing is recommended.   MACRO: None.   Signed by: Denis Morales 4/17/2025 1:33 PM Dictation workstation:   [id number]                Assessment & Plan  Biventricular congestive heart failure  89-year-old gentleman with known history of severe coronary artery disease status post PCI of LAD in 2014 and NSTEMI in 2022, history of chronic atrial fibrillation, hypertension, type 2 diabetes mellitus presents with increasing shortness of breath and leg edema, orthopnea and moderate-size increased  right pleural effusion.  Patient has been taking his Lasix and metolazone weekly as prescribed as an outpatient.  His chest x-ray is consistent with right pleural effusion significantly larger than before and EKG shows atrial fibrillation with slow ventricular response and his BN peptide is elevated  1.  Acute on chronic diastolic heart failure with increasing right edema and moderate-sized right pleural effusion and acute hypoxic respiratory failure  Chest x-ray consistent with large right pleural effusion, had thoracocentesis on April 18, 2025 and 1300 cc of pleural fluid was removed, pleural fluid cytology analysis is pending.  IV diuretics Lasix 40 mg IV twice daily, cardiology input appreciated.  Echocardiogram reviewed consistent with diastolic dysfunction.     2.  Chronic atrial fibrillation with slow ventricular response and blood pressures were rather low on admission therefore his diltiazem and metoprolol is being held, however today heart rate is around 100 210 bpm and blood pressures have improved therefore will resume metoprolol.  Patient has been on Coumadin and INR is 2.6, will resume Coumadin     3.  Diabetes mellitus type 2 stable, continue with current regime of metformin and sliding scale insulin if necessary, most recent A1c was 6.4.     4.Previous history of NSTEMI and LAD PCI.    Reviewed all labs and imaging studies discussed the plan of treatment with patient and his wife was present in the room, and she is updated with patient's condition    I spent 35 minutes in the professional and overall care of this patient.      Chon Lanza MD         [1] aspirin, 81 mg, oral, Daily  atorvastatin, 80 mg, oral, Daily  cholecalciferol, 50 mcg, oral, Daily  [Held by provider] dilTIAZem ER, 360 mg, oral, Nightly  [Held by provider] doxazosin, 6 mg, oral, Daily  furosemide, 40 mg, intravenous, BID  insulin lispro, 0-5 Units, subcutaneous, TID AC  ipratropium-albuteroL, 3 mL, nebulization, TID  [Held  by provider] lisinopril, 10 mg, oral, Daily  metFORMIN XR, 500 mg, oral, Daily  metoprolol tartrate, 12.5 mg, oral, Nightly  metoprolol tartrate, 25 mg, oral, Daily  oxygen, , inhalation, Continuous - Inhalation  pantoprazole, 40 mg, oral, Daily before breakfast  potassium chloride CR, 20 mEq, oral, Daily  warfarin, 3 mg, oral, Once  [2]    [3] PRN medications: acetaminophen **OR** acetaminophen **OR** acetaminophen, alum-mag hydroxide-simeth, dextromethorphan-guaifenesin, ipratropium-albuteroL, melatonin, ondansetron ODT **OR** ondansetron, polyethylene glycol

## 2025-04-18 NOTE — PROGRESS NOTES
Ariel Rodríguez is a 89 y.o. male admitted for shortness of breath. Pharmacy has been consulted for warfarin dosing and monitoring for Atrial Fibrillation/Atrial Flutter with goal INR of 2.0-3.0.     Home regimen   MON TUE WED THR FRI SAT SUN   Dose 5  mg 5  mg 5  mg 5  mg 5  mg 5  mg 5  mg   Total weekly dose: 35 mg  Source: [facility]    Labs  INR: 2.6  Hgb/Hct/Plt  Lab Results   Component Value Date    HGB 10.3 (L) 04/18/2025    HGB 10.4 (L) 04/17/2025    HGB 10.7 (L) 04/02/2025    HGB 10.9 (L) 12/18/2024    HGB 10.9 (L) 11/06/2024        Lab Results   Component Value Date    HCT 31.8 (L) 04/18/2025    HCT 31.4 (L) 04/17/2025    HCT 34.2 (L) 04/02/2025    HCT 34.4 (L) 12/18/2024    HCT 33.6 (L) 11/06/2024        Platelets   Date Value Ref Range Status   04/18/2025 164 150 - 450 x10*3/uL Final   04/17/2025 157 150 - 450 x10*3/uL Final   11/06/2024 179 150 - 450 x10*3/uL Final   05/29/2024 162 150 - 450 x10*3/uL Final     PLATELET COUNT   Date Value Ref Range Status   04/02/2025 163 140 - 400 Thousand/uL Final      Warfarin Therapy   Current regimen: resuming home reigmen  Bridging: None needed  Interacting medications: no interacting medications    Dosing History During Current Admission  Date 4/17 4/18   INR 3.0 2.6   Dose  (mg) HOLD 3     Assessment and Plan  Patient's INR of 2.6 today is Therapeutic. Hemoglobin/hematocrit/platelet stable  Warfarin inpatient plan: Originally held for thoracentesis. Unheld by LILIANA and at lower dose due to supratherapeutic INR upon admission. Getting 3 mg today.  Monitor s/sx of bleeding including epistaxis, hematuria, unusual bruising, hemoptysis, hematochezia as well as s/sx of stroke including impaired speech, unilateral paralysis, blurry vision.  Pharmacy will continue to monitor the patient and adjust therapy as needed.    Thank you for the consult. Please do not hesitate to contact a pharmacist with any questions.    Gracie Ornelas, PharmD

## 2025-04-18 NOTE — CARE PLAN
The patient's goals for the shift include maintain sp02 greater than 92%    The clinical goals for the shift include monitor spo2    Over the shift, the patient did not make progress toward the following goals. Barriers to progression include . Recommendations to address these barriers include   Problem: Pain - Adult  Goal: Verbalizes/displays adequate comfort level or baseline comfort level  Outcome: Progressing     Problem: Fall/Injury  Goal: Not fall by end of shift  Outcome: Progressing  Goal: Be free from injury by end of the shift  Outcome: Progressing  Goal: Verbalize understanding of personal risk factors for fall in the hospital  Outcome: Progressing  Goal: Verbalize understanding of risk factor reduction measures to prevent injury from fall in the home  Outcome: Progressing  Goal: Use assistive devices by end of the shift  Outcome: Progressing  Goal: Pace activities to prevent fatigue by end of the shift  Outcome: Progressing     Problem: Skin  Goal: Decreased wound size/increased tissue granulation at next dressing change  Outcome: Progressing  Goal: Participates in plan/prevention/treatment measures  Outcome: Progressing  Goal: Prevent/manage excess moisture  Outcome: Progressing  Goal: Prevent/minimize sheer/friction injuries  Outcome: Progressing  Goal: Promote/optimize nutrition  Outcome: Progressing  Goal: Promote skin healing  Outcome: Progressing     Problem: Respiratory  Goal: Clear secretions with interventions this shift  Outcome: Progressing  Goal: Minimize anxiety/maximize coping throughout shift  Outcome: Progressing  Goal: Minimal/no exertional discomfort or dyspnea this shift  Outcome: Progressing  Goal: No signs of respiratory distress (eg. Use of accessory muscles. Peds grunting)  Outcome: Progressing  Goal: Patent airway maintained this shift  Outcome: Progressing  Goal: Tolerate mechanical ventilation evidenced by VS/agitation level this shift  Outcome: Progressing  Goal: Tolerate  pulmonary toileting this shift  Outcome: Progressing  Goal: Verbalize decreased shortness of breath this shift  Outcome: Progressing  Goal: Wean oxygen to maintain O2 saturation per order/standard this shift  Outcome: Progressing  Goal: Increase self care and/or family involvement in next 24 hours  Outcome: Progressing   .

## 2025-04-18 NOTE — INTERVAL H&P NOTE
H&P reviewed. The patient was examined and there are no changes to the H&P.    US guided thoracentesis

## 2025-04-18 NOTE — ASSESSMENT & PLAN NOTE
89-year-old gentleman with known history of severe coronary artery disease status post PCI of LAD in 2014 and NSTEMI in 2022, history of chronic atrial fibrillation, hypertension, type 2 diabetes mellitus presents with increasing shortness of breath and leg edema, orthopnea and moderate-size increased right pleural effusion.  Patient has been taking his Lasix and metolazone weekly as prescribed as an outpatient.  His chest x-ray is consistent with right pleural effusion significantly larger than before and EKG shows atrial fibrillation with slow ventricular response and his BN peptide is elevated

## 2025-04-18 NOTE — PROGRESS NOTES
"Subjective Data:  Patient resting, + dyspneic at rest  Supp oxygen in place via nc 4 L>no oxygen required at home  Reports optimal urinary response to diuretic therapy>minimal breathing improvement  Awaiting thoracentesis today    Overnight Events:    None reported     Objective Data:  Last Recorded Vitals:  Vitals:    25 0103 25 0421 25 0747 25 0808   BP:  131/78  132/78   BP Location:  Right arm  Right arm   Patient Position:  Lying  Lying   Pulse: 102   107   Resp:  18  18   Temp:  37.1 °C (98.7 °F)  37.3 °C (99.1 °F)   TempSrc:    Temporal   SpO2:  98% 96% 96%   Weight:       Height:         Medical Gas Therapy: Supplemental oxygen  Medical Gas Delivery Method: Nasal cannula  Weight  Av.1 kg (170 lb)  Min: 77.1 kg (170 lb)  Max: 77.1 kg (170 lb)    LABS:  CMP:  Results from last 7 days   Lab Units 25  0611 25  1343 25  1248   SODIUM mmol/L 136  --  132*   POTASSIUM mmol/L 3.6  --  4.3   CHLORIDE mmol/L 94*  --  99   CO2 mmol/L 35*  --  24   ANION GAP mmol/L 11  --  13   BUN mg/dL 21  --  26*   CREATININE mg/dL 0.96  --  1.10   EGFR mL/min/1.73m*2 75  --  64   MAGNESIUM mg/dL 2.05 1.87 1.86   ALBUMIN g/dL  --   --  3.6   ALT U/L  --   --  14   AST U/L  --   --  22   BILIRUBIN TOTAL mg/dL  --   --  0.7     CBC:  Results from last 7 days   Lab Units 25  0611 25  1248   WBC AUTO x10*3/uL 6.5 6.5   HEMOGLOBIN g/dL 10.3* 10.4*   HEMATOCRIT % 31.8* 31.4*   PLATELETS AUTO x10*3/uL 164 157   MCV fL 90 89     COAG:   Results from last 7 days   Lab Units 25  0611 25  1248   INR  2.6* 3.0*     ABO: No results found for: \"ABO\"  HEME/ENDO:     CARDIAC:   Results from last 7 days   Lab Units 25  0611 25  1343 25  1248   LD U/L 153  --   --    TROPHS ng/L  --  5 5   BNP pg/mL  --   --  466*             Last I/O:    Intake/Output Summary (Last 24 hours) at 2025 1027  Last data filed at 2025 0421  Gross per 24 hour   Intake 60 ml "   Output 700 ml   Net -640 ml     Net IO Since Admission: -640 mL [04/18/25 1027]       Physical Exam:  GENERAL: alert, cooperative, pleasant, in no acute distress  SKIN: warm, dry  NECK: no JVD  CARDIAC: Irregularly irregular + murmur  PULMONARY: + Wheezing/crackles throughout lung fields; supp oxygen in place  ABDOMEN: soft, nondistended  EXTREMITIES: No noted lower extremity edema  NEURO: Alert and oriented x 3.  Grossly normal.  Moves all 4 extremities.      Assessment/Plan  Ariel Rodríguez is a 89 y.o. male with past medical history significant for Severe coronary artery disease s/p prior PCI to proximal and distal LAD and  NSTEMI 8/2014 (medical management), HFpEF, permanent atrial fibrillation on Coumadin, hypertension, Hyperlipidemia, Diabetes mellitus, Right pleural effusion.  Presented with shortness of breath.  Cardiology is consulted for CHF.     Home CV meds: Aspirin 81 mg daily Atorvastatin 80 mg daily Diltiazem  mg daily Metoprolol tartrate 25 mg twice daily Coumadin 5 mg daily Doxazosin 6 mg daily Ramipril 5 mg daily Furosemide 40 mg daily Metolazone 2.5 mg weekly    I reviewed telemetry, AF rates 's     #Acute hypoxic respiratory failure- Currently on 4 L NC (on no oxygen at home)   #Chronic right pleural effusion   #Acute on chronic HFpEF-    # Severe CAD s/p prior PCI to proximal and distal LAD and  NSTEMI 8/2014 (medical management)  #Permanent atrial fibrillation on Coumadin- rate controlled   #Hypertension  #Hyperlipidemia     Recommendations   Suspect symptoms more related to worsening right sided pleural effusion. Possible component for CHF exacerbation.   Lasix 80 mg IVP x 1 followed by 40 mg IV twice daily  Recommend holding Coumadin in prep for thoracentesis> coordination per primary team  Echocardiogram pending    Code Status:  Full Code    I spent 35 minutes in the professional and overall care of this patient.        Angeles Caceres, APRN-CNP    STAFF  ADDENDUM:    Both the LILIANA and I have had a face to face encounter with the patient today. I have examined the patient and edited the documented physical examination as necessary.  I personally reviewed the patient's vital signs, telemetry, recent labs, medications, orders, EKGs, and pertinent cardiac imaging/ echocardiography.  I have reviewed the LILIANA's encounter note, approve the LILIANA's documentation and have edited the note to reflect my diagnostic and therapeutic plan.      He is now s.p 1.3 L thoracentesis today.  Remains on oxygen.  Feels improved dyspnea.  We will continue with IV diuresis.  Echocardiogram shows normal LVEF with small to moderate pericardial effusion without evidence of tamponade      Trino June, DO

## 2025-04-18 NOTE — CARE PLAN
The patient's goals for the shift include maintain sp02 greater than 92%    The clinical goals for the shift include manage SPO2 level      Problem: Pain - Adult  Goal: Verbalizes/displays adequate comfort level or baseline comfort level  Outcome: Progressing     Problem: Safety - Adult  Goal: Free from fall injury  Outcome: Progressing     Problem: Discharge Planning  Goal: Discharge to home or other facility with appropriate resources  Outcome: Progressing     Problem: Chronic Conditions and Co-morbidities  Goal: Patient's chronic conditions and co-morbidity symptoms are monitored and maintained or improved  Outcome: Progressing     Problem: Nutrition  Goal: Nutrient intake appropriate for maintaining nutritional needs  Outcome: Progressing     Problem: Fall/Injury  Goal: Not fall by end of shift  Outcome: Progressing  Goal: Be free from injury by end of the shift  Outcome: Progressing  Goal: Verbalize understanding of personal risk factors for fall in the hospital  Outcome: Progressing  Goal: Verbalize understanding of risk factor reduction measures to prevent injury from fall in the home  Outcome: Progressing  Goal: Use assistive devices by end of the shift  Outcome: Progressing  Goal: Pace activities to prevent fatigue by end of the shift  Outcome: Progressing     Problem: Skin  Goal: Decreased wound size/increased tissue granulation at next dressing change  Outcome: Progressing  Goal: Participates in plan/prevention/treatment measures  Outcome: Progressing  Goal: Prevent/manage excess moisture  Outcome: Progressing  Goal: Prevent/minimize sheer/friction injuries  Outcome: Progressing  Goal: Promote/optimize nutrition  Outcome: Progressing  Goal: Promote skin healing  Outcome: Progressing     Problem: Respiratory  Goal: Clear secretions with interventions this shift  Outcome: Progressing  Goal: Minimize anxiety/maximize coping throughout shift  Outcome: Progressing  Goal: Minimal/no exertional discomfort or  dyspnea this shift  Outcome: Progressing  Goal: No signs of respiratory distress (eg. Use of accessory muscles. Peds grunting)  Outcome: Progressing  Goal: Patent airway maintained this shift  Outcome: Progressing  Goal: Tolerate mechanical ventilation evidenced by VS/agitation level this shift  Outcome: Progressing  Goal: Tolerate pulmonary toileting this shift  Outcome: Progressing  Goal: Verbalize decreased shortness of breath this shift  Outcome: Progressing  Goal: Wean oxygen to maintain O2 saturation per order/standard this shift  Outcome: Progressing  Goal: Increase self care and/or family involvement in next 24 hours  Outcome: Progressing

## 2025-04-19 ENCOUNTER — APPOINTMENT (OUTPATIENT)
Dept: CARDIOLOGY | Facility: HOSPITAL | Age: OVER 89
DRG: 291 | End: 2025-04-19
Payer: MEDICARE

## 2025-04-19 LAB
ACID FAST STN SPEC: NORMAL
ANION GAP SERPL CALC-SCNC: 11 MMOL/L (ref 10–20)
BUN SERPL-MCNC: 24 MG/DL (ref 6–23)
CALCIUM SERPL-MCNC: 8.8 MG/DL (ref 8.6–10.3)
CHLORIDE SERPL-SCNC: 91 MMOL/L (ref 98–107)
CO2 SERPL-SCNC: 36 MMOL/L (ref 21–32)
CREAT SERPL-MCNC: 0.96 MG/DL (ref 0.5–1.3)
EGFRCR SERPLBLD CKD-EPI 2021: 75 ML/MIN/1.73M*2
ERYTHROCYTE [DISTWIDTH] IN BLOOD BY AUTOMATED COUNT: 15.4 % (ref 11.5–14.5)
GLUCOSE BLD MANUAL STRIP-MCNC: 112 MG/DL (ref 74–99)
GLUCOSE BLD MANUAL STRIP-MCNC: 134 MG/DL (ref 74–99)
GLUCOSE BLD MANUAL STRIP-MCNC: 142 MG/DL (ref 74–99)
GLUCOSE BLD MANUAL STRIP-MCNC: 197 MG/DL (ref 74–99)
GLUCOSE SERPL-MCNC: 114 MG/DL (ref 74–99)
HCT VFR BLD AUTO: 33.2 % (ref 41–52)
HGB BLD-MCNC: 11.1 G/DL (ref 13.5–17.5)
INR PPP: 2.3 (ref 0.9–1.1)
MAGNESIUM SERPL-MCNC: 1.84 MG/DL (ref 1.6–2.4)
MCH RBC QN AUTO: 29.5 PG (ref 26–34)
MCHC RBC AUTO-ENTMCNC: 33.4 G/DL (ref 32–36)
MCV RBC AUTO: 88 FL (ref 80–100)
MYCOBACTERIUM SPEC CULT: NORMAL
NRBC BLD-RTO: 0 /100 WBCS (ref 0–0)
PH FLD: 7.78 [PH]
PLATELET # BLD AUTO: 156 X10*3/UL (ref 150–450)
POTASSIUM SERPL-SCNC: 3.4 MMOL/L (ref 3.5–5.3)
PROTHROMBIN TIME: 26 SECONDS (ref 9.8–12.4)
RBC # BLD AUTO: 3.76 X10*6/UL (ref 4.5–5.9)
SODIUM SERPL-SCNC: 135 MMOL/L (ref 136–145)
WBC # BLD AUTO: 10.4 X10*3/UL (ref 4.4–11.3)

## 2025-04-19 PROCEDURE — 2500000001 HC RX 250 WO HCPCS SELF ADMINISTERED DRUGS (ALT 637 FOR MEDICARE OP): Performed by: NURSE PRACTITIONER

## 2025-04-19 PROCEDURE — 80048 BASIC METABOLIC PNL TOTAL CA: CPT | Performed by: NURSE PRACTITIONER

## 2025-04-19 PROCEDURE — 85610 PROTHROMBIN TIME: CPT

## 2025-04-19 PROCEDURE — 97530 THERAPEUTIC ACTIVITIES: CPT | Mod: GO

## 2025-04-19 PROCEDURE — 99232 SBSQ HOSP IP/OBS MODERATE 35: CPT | Performed by: INTERNAL MEDICINE

## 2025-04-19 PROCEDURE — 2500000004 HC RX 250 GENERAL PHARMACY W/ HCPCS (ALT 636 FOR OP/ED): Mod: JZ | Performed by: INTERNAL MEDICINE

## 2025-04-19 PROCEDURE — 97165 OT EVAL LOW COMPLEX 30 MIN: CPT | Mod: GO

## 2025-04-19 PROCEDURE — 82947 ASSAY GLUCOSE BLOOD QUANT: CPT

## 2025-04-19 PROCEDURE — 85027 COMPLETE CBC AUTOMATED: CPT | Performed by: NURSE PRACTITIONER

## 2025-04-19 PROCEDURE — 2500000002 HC RX 250 W HCPCS SELF ADMINISTERED DRUGS (ALT 637 FOR MEDICARE OP, ALT 636 FOR OP/ED): Performed by: NURSE PRACTITIONER

## 2025-04-19 PROCEDURE — 2500000002 HC RX 250 W HCPCS SELF ADMINISTERED DRUGS (ALT 637 FOR MEDICARE OP, ALT 636 FOR OP/ED)

## 2025-04-19 PROCEDURE — 2500000002 HC RX 250 W HCPCS SELF ADMINISTERED DRUGS (ALT 637 FOR MEDICARE OP, ALT 636 FOR OP/ED): Performed by: INTERNAL MEDICINE

## 2025-04-19 PROCEDURE — 94640 AIRWAY INHALATION TREATMENT: CPT

## 2025-04-19 PROCEDURE — 93005 ELECTROCARDIOGRAM TRACING: CPT

## 2025-04-19 PROCEDURE — 2060000001 HC INTERMEDIATE ICU ROOM DAILY

## 2025-04-19 PROCEDURE — 2500000004 HC RX 250 GENERAL PHARMACY W/ HCPCS (ALT 636 FOR OP/ED): Mod: JZ

## 2025-04-19 PROCEDURE — 83735 ASSAY OF MAGNESIUM: CPT | Performed by: NURSE PRACTITIONER

## 2025-04-19 PROCEDURE — 94664 DEMO&/EVAL PT USE INHALER: CPT

## 2025-04-19 PROCEDURE — 99233 SBSQ HOSP IP/OBS HIGH 50: CPT

## 2025-04-19 PROCEDURE — 36415 COLL VENOUS BLD VENIPUNCTURE: CPT | Performed by: NURSE PRACTITIONER

## 2025-04-19 PROCEDURE — 2500000005 HC RX 250 GENERAL PHARMACY W/O HCPCS: Performed by: INTERNAL MEDICINE

## 2025-04-19 RX ORDER — FUROSEMIDE 10 MG/ML
80 INJECTION INTRAMUSCULAR; INTRAVENOUS ONCE
Status: COMPLETED | OUTPATIENT
Start: 2025-04-19 | End: 2025-04-19

## 2025-04-19 RX ORDER — FUROSEMIDE 10 MG/ML
80 INJECTION INTRAMUSCULAR; INTRAVENOUS
Status: DISCONTINUED | OUTPATIENT
Start: 2025-04-20 | End: 2025-04-20

## 2025-04-19 RX ORDER — METOPROLOL TARTRATE 1 MG/ML
5 INJECTION, SOLUTION INTRAVENOUS EVERY 6 HOURS PRN
Status: DISCONTINUED | OUTPATIENT
Start: 2025-04-19 | End: 2025-04-23 | Stop reason: HOSPADM

## 2025-04-19 RX ORDER — WARFARIN SODIUM 5 MG/1
5 TABLET ORAL DAILY
Status: DISCONTINUED | OUTPATIENT
Start: 2025-04-19 | End: 2025-04-23 | Stop reason: HOSPADM

## 2025-04-19 RX ADMIN — FUROSEMIDE 40 MG: 10 INJECTION, SOLUTION INTRAMUSCULAR; INTRAVENOUS at 08:32

## 2025-04-19 RX ADMIN — ASPIRIN 81 MG: 81 TABLET, COATED ORAL at 08:32

## 2025-04-19 RX ADMIN — IPRATROPIUM BROMIDE AND ALBUTEROL SULFATE 3 ML: 2.5; .5 SOLUTION RESPIRATORY (INHALATION) at 07:08

## 2025-04-19 RX ADMIN — Medication 3 L/MIN: at 19:24

## 2025-04-19 RX ADMIN — FUROSEMIDE 80 MG: 10 INJECTION, SOLUTION INTRAMUSCULAR; INTRAVENOUS at 14:16

## 2025-04-19 RX ADMIN — PANTOPRAZOLE SODIUM 40 MG: 40 TABLET, DELAYED RELEASE ORAL at 06:42

## 2025-04-19 RX ADMIN — Medication 50 MCG: at 06:41

## 2025-04-19 RX ADMIN — METOPROLOL TARTRATE 12.5 MG: 25 TABLET, FILM COATED ORAL at 20:06

## 2025-04-19 RX ADMIN — POTASSIUM CHLORIDE 20 MEQ: 1500 TABLET, EXTENDED RELEASE ORAL at 08:32

## 2025-04-19 RX ADMIN — ACETAMINOPHEN 650 MG: 325 TABLET, FILM COATED ORAL at 14:16

## 2025-04-19 RX ADMIN — IPRATROPIUM BROMIDE AND ALBUTEROL SULFATE 3 ML: 2.5; .5 SOLUTION RESPIRATORY (INHALATION) at 12:17

## 2025-04-19 RX ADMIN — METFORMIN ER 500 MG 500 MG: 500 TABLET ORAL at 08:32

## 2025-04-19 RX ADMIN — Medication 3 L/MIN: at 07:08

## 2025-04-19 RX ADMIN — IPRATROPIUM BROMIDE AND ALBUTEROL SULFATE 3 ML: 2.5; .5 SOLUTION RESPIRATORY (INHALATION) at 19:22

## 2025-04-19 RX ADMIN — METOPROLOL TARTRATE 25 MG: 25 TABLET, FILM COATED ORAL at 08:32

## 2025-04-19 RX ADMIN — ATORVASTATIN CALCIUM 80 MG: 80 TABLET, FILM COATED ORAL at 08:32

## 2025-04-19 RX ADMIN — METOPROLOL TARTRATE 5 MG: 5 INJECTION INTRAVENOUS at 20:30

## 2025-04-19 RX ADMIN — WARFARIN SODIUM 5 MG: 5 TABLET ORAL at 17:34

## 2025-04-19 ASSESSMENT — PAIN SCALES - GENERAL
PAINLEVEL_OUTOF10: 0 - NO PAIN
PAINLEVEL_OUTOF10: 5 - MODERATE PAIN
PAINLEVEL_OUTOF10: 0 - NO PAIN

## 2025-04-19 ASSESSMENT — COGNITIVE AND FUNCTIONAL STATUS - GENERAL
MOVING FROM LYING ON BACK TO SITTING ON SIDE OF FLAT BED WITH BEDRAILS: A LITTLE
DRESSING REGULAR UPPER BODY CLOTHING: A LITTLE
STANDING UP FROM CHAIR USING ARMS: A LITTLE
DRESSING REGULAR UPPER BODY CLOTHING: A LITTLE
PERSONAL GROOMING: A LITTLE
HELP NEEDED FOR BATHING: A LITTLE
DAILY ACTIVITIY SCORE: 16
DAILY ACTIVITIY SCORE: 18
EATING MEALS: A LITTLE
MOVING TO AND FROM BED TO CHAIR: A LITTLE
DRESSING REGULAR LOWER BODY CLOTHING: A LITTLE
WALKING IN HOSPITAL ROOM: A LOT
TOILETING: A LITTLE
CLIMB 3 TO 5 STEPS WITH RAILING: A LITTLE
MOBILITY SCORE: 17
PERSONAL GROOMING: A LITTLE
TURNING FROM BACK TO SIDE WHILE IN FLAT BAD: A LITTLE
DRESSING REGULAR LOWER BODY CLOTHING: A LOT
TOILETING: A LOT
HELP NEEDED FOR BATHING: A LOT

## 2025-04-19 ASSESSMENT — PAIN - FUNCTIONAL ASSESSMENT
PAIN_FUNCTIONAL_ASSESSMENT: 0-10

## 2025-04-19 ASSESSMENT — ACTIVITIES OF DAILY LIVING (ADL)
EFFECT OF PAIN ON DAILY ACTIVITIES: UNABLE TO COMPLETE ADL
ADL_ASSISTANCE: INDEPENDENT
EFFECT OF PAIN ON DAILY ACTIVITIES: UNABLE TO COMPLETE ADL

## 2025-04-19 ASSESSMENT — PAIN DESCRIPTION - DESCRIPTORS
DESCRIPTORS: DISCOMFORT;ACHING
DESCRIPTORS: DISCOMFORT;ACHING

## 2025-04-19 NOTE — CARE PLAN
The patient's goals for the shift include maintain sp02 greater than 92%    The clinical goals for the shift include patient remain HDS throughout shift      Problem: Pain - Adult  Goal: Verbalizes/displays adequate comfort level or baseline comfort level  Outcome: Progressing     Problem: Safety - Adult  Goal: Free from fall injury  Outcome: Progressing     Problem: Discharge Planning  Goal: Discharge to home or other facility with appropriate resources  Outcome: Progressing     Problem: Chronic Conditions and Co-morbidities  Goal: Patient's chronic conditions and co-morbidity symptoms are monitored and maintained or improved  Outcome: Progressing     Problem: Nutrition  Goal: Nutrient intake appropriate for maintaining nutritional needs  Outcome: Progressing     Problem: Fall/Injury  Goal: Not fall by end of shift  Outcome: Progressing  Goal: Be free from injury by end of the shift  Outcome: Progressing  Goal: Verbalize understanding of personal risk factors for fall in the hospital  Outcome: Progressing  Goal: Verbalize understanding of risk factor reduction measures to prevent injury from fall in the home  Outcome: Progressing  Goal: Use assistive devices by end of the shift  Outcome: Progressing  Goal: Pace activities to prevent fatigue by end of the shift  Outcome: Progressing     Problem: Skin  Goal: Decreased wound size/increased tissue granulation at next dressing change  Outcome: Progressing  Goal: Participates in plan/prevention/treatment measures  Outcome: Progressing  Goal: Prevent/manage excess moisture  Outcome: Progressing  Goal: Prevent/minimize sheer/friction injuries  Outcome: Progressing  Goal: Promote/optimize nutrition  Outcome: Progressing  Goal: Promote skin healing  Outcome: Progressing     Problem: Respiratory  Goal: Clear secretions with interventions this shift  Outcome: Progressing  Goal: Minimize anxiety/maximize coping throughout shift  Outcome: Progressing  Goal: Minimal/no exertional  discomfort or dyspnea this shift  Outcome: Progressing  Goal: No signs of respiratory distress (eg. Use of accessory muscles. Peds grunting)  Outcome: Progressing  Goal: Patent airway maintained this shift  Outcome: Progressing  Goal: Tolerate mechanical ventilation evidenced by VS/agitation level this shift  Outcome: Progressing  Goal: Tolerate pulmonary toileting this shift  Outcome: Progressing  Goal: Verbalize decreased shortness of breath this shift  Outcome: Progressing  Goal: Wean oxygen to maintain O2 saturation per order/standard this shift  Outcome: Progressing  Goal: Increase self care and/or family involvement in next 24 hours  Outcome: Progressing

## 2025-04-19 NOTE — ASSESSMENT & PLAN NOTE
89-year-old gentleman with known history of severe coronary artery disease status post PCI of LAD in 2014 and NSTEMI in 2022, history of chronic atrial fibrillation, hypertension, type 2 diabetes mellitus presents with increasing shortness of breath and leg edema, orthopnea and moderate-size increased right pleural effusion.  Patient has been taking his Lasix and metolazone weekly as prescribed as an outpatient.  His chest x-ray was consistent with right pleural effusion significantly larger than before and EKG shows atrial fibrillation with slow ventricular response and his BN peptide is elevated

## 2025-04-19 NOTE — PROGRESS NOTES
Ariel Rodríguez is a 89 y.o. male admitted for shortness of breath. Pharmacy has been consulted for warfarin dosing and monitoring for Atrial Fibrillation/Atrial Flutter with goal INR of 2.0-3.0.     Home regimen   MON TUE WED THR FRI SAT SUN   Dose 5  mg 5  mg 5  mg 5  mg 5  mg 5  mg 5  mg   Total weekly dose: 35 mg  Source: [facility]    Labs  INR: 2.3  Hgb/Hct/Plt  Lab Results   Component Value Date    HGB 11.1 (L) 04/19/2025    HGB 10.3 (L) 04/18/2025    HGB 10.4 (L) 04/17/2025    HGB 10.7 (L) 04/02/2025    HGB 10.9 (L) 12/18/2024        Lab Results   Component Value Date    HCT 33.2 (L) 04/19/2025    HCT 31.8 (L) 04/18/2025    HCT 31.4 (L) 04/17/2025    HCT 34.2 (L) 04/02/2025    HCT 34.4 (L) 12/18/2024        Platelets   Date Value Ref Range Status   04/19/2025 156 150 - 450 x10*3/uL Final   04/18/2025 164 150 - 450 x10*3/uL Final   04/17/2025 157 150 - 450 x10*3/uL Final   11/06/2024 179 150 - 450 x10*3/uL Final     PLATELET COUNT   Date Value Ref Range Status   04/02/2025 163 140 - 400 Thousand/uL Final      Warfarin Therapy   Current regimen: resuming home reigmen  Bridging: None needed  Interacting medications: no interacting medications    Dosing History During Current Admission  Date 4/17 4/18 4/19   INR 3.0 2.6 2.3   Dose  (mg) HOLD 3 mg 5 mg     Assessment and Plan  Patient's INR of 2.3 today is Therapeutic. Hemoglobin/hematocrit/platelet stable  Warfarin inpatient plan: Give 5 mg today per home regimen.  Monitor s/sx of bleeding including epistaxis, hematuria, unusual bruising, hemoptysis, hematochezia as well as s/sx of stroke including impaired speech, unilateral paralysis, blurry vision.  Pharmacy will continue to monitor the patient and adjust therapy as needed.    Thank you for the consult. Please do not hesitate to contact a pharmacist with any questions.    Gracie Ornelas, MeenaD

## 2025-04-19 NOTE — PROGRESS NOTES
"Subjective Data:  Patient reports feeling comfortable at rest, supplemental oxygen via nc 3 L  Reports feeling better with breathing after thoracentesis 25 with R 1300 cc fluid removal  Patient still appears tachypneic with conversation and minimal movement    Overnight Events:    None reported     Objective Data:  Last Recorded Vitals:  Vitals:    25 0708 25 0817 25 1217 25 1224   BP:  110/63  116/71   BP Location:  Right arm  Right arm   Patient Position:  Lying  Lying   Pulse:  (!) 113  107   Resp:  18  16   Temp:  37 °C (98.6 °F)  37 °C (98.6 °F)   TempSrc:  Temporal  Temporal   SpO2: 94% 94% 96% 96%   Weight:       Height:         Medical Gas Therapy: Supplemental oxygen  Medical Gas Delivery Method: Nasal cannula  Weight  Av.1 kg (170 lb)  Min: 77.1 kg (170 lb)  Max: 77.1 kg (170 lb)    LABS:  CMP:  Results from last 7 days   Lab Units 25  0548 25  0611 25  1343 25  1248   SODIUM mmol/L 135* 136  --  132*   POTASSIUM mmol/L 3.4* 3.6  --  4.3   CHLORIDE mmol/L 91* 94*  --  99   CO2 mmol/L 36* 35*  --  24   ANION GAP mmol/L 11 11  --  13   BUN mg/dL 24* 21  --  26*   CREATININE mg/dL 0.96 0.96  --  1.10   EGFR mL/min/1.73m*2 75 75  --  64   MAGNESIUM mg/dL 1.84 2.05 1.87 1.86   ALBUMIN g/dL  --   --   --  3.6   ALT U/L  --   --   --  14   AST U/L  --   --   --  22   BILIRUBIN TOTAL mg/dL  --   --   --  0.7     CBC:  Results from last 7 days   Lab Units 25  0548 25  0611 25  1248   WBC AUTO x10*3/uL 10.4 6.5 6.5   HEMOGLOBIN g/dL 11.1* 10.3* 10.4*   HEMATOCRIT % 33.2* 31.8* 31.4*   PLATELETS AUTO x10*3/uL 156 164 157   MCV fL 88 90 89     COAG:   Results from last 7 days   Lab Units 25  0548 25  0611 25  1248   INR  2.3* 2.6* 3.0*     ABO: No results found for: \"ABO\"  HEME/ENDO:     CARDIAC:   Results from last 7 days   Lab Units 25  0611 25  1343 25  1248   LD U/L 153  --   --    TROPHS ng/L  --  5 5 "   BNP pg/mL  --   --  466*             Last I/O:    Intake/Output Summary (Last 24 hours) at 4/19/2025 1349  Last data filed at 4/19/2025 0400  Gross per 24 hour   Intake --   Output 1600 ml   Net -1600 ml     Net IO Since Admission: -2,790 mL [04/19/25 1349]         Physical Exam:  GENERAL: alert, cooperative, pleasant, in no acute distress  SKIN: warm, dry  NECK: no JVD  CARDIAC: Irregularly irregular + murmur  PULMONARY: + Wheezing/crackles throughout lung fields; supp oxygen in place  ABDOMEN: soft, nondistended  EXTREMITIES: No noted lower extremity edema  NEURO: Alert and oriented x 3.  Grossly normal.  Moves all 4 extremities.      Assessment/Plan  Ariel Rodríguez is a 89 y.o. male with past medical history significant for Severe coronary artery disease s/p prior PCI to proximal and distal LAD and  NSTEMI 8/2014 (medical management), HFpEF, permanent atrial fibrillation on Coumadin, hypertension, Hyperlipidemia, Diabetes mellitus, Right pleural effusion.  Presented with shortness of breath.  Cardiology is consulted for CHF.     Home CV meds: Aspirin 81 mg daily Atorvastatin 80 mg daily Diltiazem  mg daily Metoprolol tartrate 25 mg twice daily Coumadin 5 mg daily Doxazosin 6 mg daily Ramipril 5 mg daily Furosemide 40 mg daily Metolazone 2.5 mg weekly     I reviewed telemetry, AF rates 's     #Acute hypoxic respiratory failure- Currently on 3 L NC (on no oxygen at home)   #Chronic right pleural effusion s/p thoracentesis with 1300 cc fluid removal 4/18/2025  #Acute on chronic HFpEF-    # Severe CAD s/p prior PCI to proximal and distal LAD and  NSTEMI 8/2014 (medical management)  #Permanent atrial fibrillation on Coumadin- rate controlled   #Hypertension  #Hyperlipidemia     Recommendations   Will increase Lasix to 80 mg IV twice daily> monitor response  Echocardiogram with normal EF, and (small to moderate pericardial effusion noted without tamponade> for continued surveillance)  Daily  weights, strict I's and O's, monitor renal function, monitor electrolytes, keep K>4.0 and Mag>2.0, supp prn        Code Status:  DNR and No Intubation    I spent 45 minutes in the professional and overall care of this patient.        Angeles Caceres, APRN-CNP

## 2025-04-19 NOTE — PROGRESS NOTES
"Ariel Rodríguez is a 89 y.o. male on day 2 of admission presenting with Biventricular congestive heart failure.    Subjective   Patient seen and examined, he is coming along, breathing a lot better this completely resolved.  Patient is status post right thoracocentesis and 1300 cc of pleural fluid was removed other day for fluid analysis is consistent with a transudate.  No fever or chills has dry cough , no sputum.       Objective     Physical Exam  GENERAL:  Alert, mild distress, cooperative  SKIN:  Skin color, texture, turgor normal. No rashes or lesions.  OROPHARYNX:  Lips, mucosa, and tongue are normal.Teeth and gums, normal. Oropharynx normal.  NECK:  No jugulovenous distention, No carotid bruits, Carotid pulse normal contour, Supple  LUNGS: Improved air exchange, status post right thoracocentesis and 1300 cc of pleural fluid was removed on 4/18/25  CARDIAC: Atrial fibrillation with variable rate around 110 bpm, normal S1 and S2; no rubs,  or gallops, 2/6 systolic ejection murmur left sternal border, CHF improving  ABDOMEN:  Abdomen soft, non-tender, BS normal, No masses or organomegaly  EXTREMETIES:  Extremities normal, no deformities, trace edema,no clubbing NEURO:  Alert, oriented X 3, Non-focal. PULSES:  2+ radial, 2+ carotid       Last Recorded Vitals  Blood pressure 116/71, pulse 107, temperature 37 °C (98.6 °F), temperature source Temporal, resp. rate 16, height 1.6 m (5' 3\"), weight 77.1 kg (170 lb), SpO2 96%.  Intake/Output last 3 Shifts:  I/O last 3 completed shifts:  In: 60 (0.8 mL/kg) [I.V.:60 (0.8 mL/kg)]  Out: 2850 (37 mL/kg) [Urine:2850 (1 mL/kg/hr)]  Weight: 77.1 kg     Relevant Results  Scheduled medications  Scheduled Medications[1]  Continuous medications  Continuous Medications[2]  PRN medications  PRN Medications[3]     Results for orders placed or performed during the hospital encounter of 04/17/25 (from the past 96 hours)   ECG 12 lead   Result Value Ref Range    Ventricular Rate 64 " BPM    QRS Duration 82 ms    QT Interval 392 ms    QTC Calculation(Bazett) 404 ms    R Axis 81 degrees    T Axis 39 degrees    QRS Count 11 beats    Q Onset 221 ms    T Offset 417 ms    QTC Fredericia 400 ms   CBC and Auto Differential   Result Value Ref Range    WBC 6.5 4.4 - 11.3 x10*3/uL    nRBC 0.0 0.0 - 0.0 /100 WBCs    RBC 3.52 (L) 4.50 - 5.90 x10*6/uL    Hemoglobin 10.4 (L) 13.5 - 17.5 g/dL    Hematocrit 31.4 (L) 41.0 - 52.0 %    MCV 89 80 - 100 fL    MCH 29.5 26.0 - 34.0 pg    MCHC 33.1 32.0 - 36.0 g/dL    RDW 15.8 (H) 11.5 - 14.5 %    Platelets 157 150 - 450 x10*3/uL    Neutrophils % 77.3 40.0 - 80.0 %    Immature Granulocytes %, Automated 0.3 0.0 - 0.9 %    Lymphocytes % 8.0 13.0 - 44.0 %    Monocytes % 11.6 2.0 - 10.0 %    Eosinophils % 2.5 0.0 - 6.0 %    Basophils % 0.3 0.0 - 2.0 %    Neutrophils Absolute 5.01 1.60 - 5.50 x10*3/uL    Immature Granulocytes Absolute, Automated 0.02 0.00 - 0.50 x10*3/uL    Lymphocytes Absolute 0.52 (L) 0.80 - 3.00 x10*3/uL    Monocytes Absolute 0.75 0.05 - 0.80 x10*3/uL    Eosinophils Absolute 0.16 0.00 - 0.40 x10*3/uL    Basophils Absolute 0.02 0.00 - 0.10 x10*3/uL   Basic metabolic panel   Result Value Ref Range    Glucose 160 (H) 74 - 99 mg/dL    Sodium 132 (L) 136 - 145 mmol/L    Potassium 4.3 3.5 - 5.3 mmol/L    Chloride 99 98 - 107 mmol/L    Bicarbonate 24 21 - 32 mmol/L    Anion Gap 13 10 - 20 mmol/L    Urea Nitrogen 26 (H) 6 - 23 mg/dL    Creatinine 1.10 0.50 - 1.30 mg/dL    eGFR 64 >60 mL/min/1.73m*2    Calcium 8.7 8.6 - 10.3 mg/dL   Magnesium   Result Value Ref Range    Magnesium 1.86 1.60 - 2.40 mg/dL   Hepatic function panel   Result Value Ref Range    Albumin 3.6 3.4 - 5.0 g/dL    Bilirubin, Total 0.7 0.0 - 1.2 mg/dL    Bilirubin, Direct 0.3 0.0 - 0.3 mg/dL    Alkaline Phosphatase 85 33 - 136 U/L    ALT 14 10 - 52 U/L    AST 22 9 - 39 U/L    Total Protein 6.7 6.4 - 8.2 g/dL   Protime-INR   Result Value Ref Range    Protime 33.3 (H) 9.8 - 12.4 seconds    INR 3.0  (H) 0.9 - 1.1   B-Type Natriuretic Peptide   Result Value Ref Range     (H) 0 - 99 pg/mL   Blood Gas Venous Full Panel   Result Value Ref Range    POCT pH, Venous 7.37 7.33 - 7.43 pH    POCT pCO2, Venous 51 41 - 51 mm Hg    POCT pO2, Venous 67 (H) 35 - 45 mm Hg    POCT SO2, Venous 93 (H) 45 - 75 %    POCT Oxy Hemoglobin, Venous 91.0 (H) 45.0 - 75.0 %    POCT Hematocrit Calculated, Venous 33.0 (L) 41.0 - 52.0 %    POCT Sodium, Venous 132 (L) 136 - 145 mmol/L    POCT Potassium, Venous 4.8 3.5 - 5.3 mmol/L    POCT Chloride, Venous 98 98 - 107 mmol/L    POCT Ionized Calicum, Venous 1.22 1.10 - 1.33 mmol/L    POCT Glucose, Venous 166 (H) 74 - 99 mg/dL    POCT Lactate, Venous 1.8 0.4 - 2.0 mmol/L    POCT Base Excess, Venous 3.4 (H) -2.0 - 3.0 mmol/L    POCT HCO3 Calculated, Venous 29.5 (H) 22.0 - 26.0 mmol/L    POCT Hemoglobin, Venous 11.0 (L) 13.5 - 17.5 g/dL    POCT Anion Gap, Venous 9.0 (L) 10.0 - 25.0 mmol/L    Patient Temperature 37.0 degrees Celsius    FiO2 36 %   Sars-CoV-2 PCR   Result Value Ref Range    Coronavirus 2019, PCR Not Detected Not Detected   RSV PCR   Result Value Ref Range    RSV PCR Not Detected Not Detected   Influenza A, and B PCR   Result Value Ref Range    Flu A Result Not Detected Not Detected    Flu B Result Not Detected Not Detected   Troponin I, High Sensitivity, Initial   Result Value Ref Range    Troponin I, High Sensitivity 5 0 - 20 ng/L   Troponin, High Sensitivity, 1 Hour   Result Value Ref Range    Troponin I, High Sensitivity 5 0 - 20 ng/L   Magnesium   Result Value Ref Range    Magnesium 1.87 1.60 - 2.40 mg/dL   POCT GLUCOSE   Result Value Ref Range    POCT Glucose 151 (H) 74 - 99 mg/dL   POCT GLUCOSE   Result Value Ref Range    POCT Glucose 171 (H) 74 - 99 mg/dL   Basic metabolic panel   Result Value Ref Range    Glucose 122 (H) 74 - 99 mg/dL    Sodium 136 136 - 145 mmol/L    Potassium 3.6 3.5 - 5.3 mmol/L    Chloride 94 (L) 98 - 107 mmol/L    Bicarbonate 35 (H) 21 - 32 mmol/L     Anion Gap 11 10 - 20 mmol/L    Urea Nitrogen 21 6 - 23 mg/dL    Creatinine 0.96 0.50 - 1.30 mg/dL    eGFR 75 >60 mL/min/1.73m*2    Calcium 8.7 8.6 - 10.3 mg/dL   CBC   Result Value Ref Range    WBC 6.5 4.4 - 11.3 x10*3/uL    nRBC 0.0 0.0 - 0.0 /100 WBCs    RBC 3.53 (L) 4.50 - 5.90 x10*6/uL    Hemoglobin 10.3 (L) 13.5 - 17.5 g/dL    Hematocrit 31.8 (L) 41.0 - 52.0 %    MCV 90 80 - 100 fL    MCH 29.2 26.0 - 34.0 pg    MCHC 32.4 32.0 - 36.0 g/dL    RDW 15.7 (H) 11.5 - 14.5 %    Platelets 164 150 - 450 x10*3/uL   Magnesium   Result Value Ref Range    Magnesium 2.05 1.60 - 2.40 mg/dL   Protime-INR   Result Value Ref Range    Protime 29.3 (H) 9.8 - 12.4 seconds    INR 2.6 (H) 0.9 - 1.1   Lactate Dehydrogenase   Result Value Ref Range     84 - 246 U/L   Protein, Total   Result Value Ref Range    Total Protein 7.3 6.4 - 8.2 g/dL   Transthoracic Echo (TTE) Complete   Result Value Ref Range    AV pk sen 2.27 m/s    AV mn grad 12 mmHg    LVOT diam 2.29 cm    LA vol index A/L 88.1 ml/m2    Tricuspid annular plane systolic excursion 1.8 cm    LV EF 60 %    RV free wall pk S' 16.00 cm/s    LVIDd 4.29 cm    RVSP 46.4 mmHg    Aortic Valve Area by Continuity of VTI 1.96 cm2    Aortic Valve Area by Continuity of Peak Velocity 1.71 cm2    AV pk grad 21 mmHg    LV A4C EF 62.2    pH, Body Fluid   Result Value Ref Range    pH, Fluid 7.78 See Below   Sterile Fluid Culture/Smear    Specimen: Pleural; Fluid   Result Value Ref Range    Sterile Fluid Culture/Smear No growth to date     Gram Stain (3+) Moderate Polymorphonuclear leukocytes     Gram Stain No organisms seen    AFB Processed   Result Value Ref Range    Extra Tube Hold for add-ons.    Lactate Dehydrogenase, Body Fluid   Result Value Ref Range    LD, Fluid 105 Not established. U/L   Protein, Total, Body Fluid   Result Value Ref Range    Protein, Total Fluid 4.7 Not established g/dL   Body Fluid Cell Count   Result Value Ref Range    Color, Fluid Yellow Colorless, Straw,  Yellow    Clarity, Fluid Hazy (A) Clear    WBC, Fluid 267 See Comment /uL    RBC, Fluid 3,000 see comment /uL   Body Fluid Differential   Result Value Ref Range    Neutrophils %, Manual, Fluid 5 see comment %    Lymphocytes %, Manual, Fluid 26 see comment %    Mono/Macrophages %, Manual, Fluid 69 see comment %    Eosinophils %, Manual, Fluid 0 see comment %    Basophils %, Manual, Fluid 0 not established %    Immature Granulocytes %, Manual, Fluid 0 not established %    Blasts %, Manual, Fluid 0 not established %    Unclassified Cells %, Manual, Fluid 0 not established %    Plasma Cells %, Manual, Fluid 0 not established %    Total Cells Counted, Fluid 100    POCT GLUCOSE   Result Value Ref Range    POCT Glucose 113 (H) 74 - 99 mg/dL   POCT GLUCOSE   Result Value Ref Range    POCT Glucose 162 (H) 74 - 99 mg/dL   POCT GLUCOSE   Result Value Ref Range    POCT Glucose 161 (H) 74 - 99 mg/dL   Basic metabolic panel   Result Value Ref Range    Glucose 114 (H) 74 - 99 mg/dL    Sodium 135 (L) 136 - 145 mmol/L    Potassium 3.4 (L) 3.5 - 5.3 mmol/L    Chloride 91 (L) 98 - 107 mmol/L    Bicarbonate 36 (H) 21 - 32 mmol/L    Anion Gap 11 10 - 20 mmol/L    Urea Nitrogen 24 (H) 6 - 23 mg/dL    Creatinine 0.96 0.50 - 1.30 mg/dL    eGFR 75 >60 mL/min/1.73m*2    Calcium 8.8 8.6 - 10.3 mg/dL   CBC   Result Value Ref Range    WBC 10.4 4.4 - 11.3 x10*3/uL    nRBC 0.0 0.0 - 0.0 /100 WBCs    RBC 3.76 (L) 4.50 - 5.90 x10*6/uL    Hemoglobin 11.1 (L) 13.5 - 17.5 g/dL    Hematocrit 33.2 (L) 41.0 - 52.0 %    MCV 88 80 - 100 fL    MCH 29.5 26.0 - 34.0 pg    MCHC 33.4 32.0 - 36.0 g/dL    RDW 15.4 (H) 11.5 - 14.5 %    Platelets 156 150 - 450 x10*3/uL   Magnesium   Result Value Ref Range    Magnesium 1.84 1.60 - 2.40 mg/dL   Protime-INR   Result Value Ref Range    Protime 26.0 (H) 9.8 - 12.4 seconds    INR 2.3 (H) 0.9 - 1.1   POCT GLUCOSE   Result Value Ref Range    POCT Glucose 112 (H) 74 - 99 mg/dL   POCT GLUCOSE   Result Value Ref Range    POCT  Glucose 197 (H) 74 - 99 mg/dL   US thoracentesis  Result Date: 4/18/2025  Interpreted By:  Alex Tabares, STUDY: US THORACENTESIS4/18/20253:15 pm   INDICATION: Signs/Symptoms:right sided pleural effusion   COMPARISON: CXR 4/17/25   ACCESSION NUMBER(S): [id number]   ORDERING CLINICIAN: ROSEMARIE CHAVEZ   TECHNIQUE: INTERVENTIONALIST(S): Alex Tabares   CONSENT: The patient/patient's POA/next of kin was informed of the nature of the proposed procedure. The purposes, alternatives, risks, and benefits were explained and discussed. All questions were answered and consent was obtained.   SEDATION: None   MEDICATION/CONTRAST: 1% lidocaine was used to anesthetize subcutaneously.   TIME OUT: A time out was performed immediately prior to procedure start with the interventional team, correctly identifying the patient name, date of birth, MRN, procedure, anatomy (including marking of site and side), patient position, procedure consent form, relevant laboratory and imaging test results, antibiotic administration, safety precautions, and procedure-specific equipment needs.   FINDINGS: The patient was placed in the sitting position.   The pleural space was examined with grey scale ultrasound, and the most accessible fluid identified and marked for thoracentesis.   The skin was prepped and draped in usual manner. Local anesthesia with lidocaine was administered and a right-sided thoracentesis was performed.  A 5 Croatian One-Step Valved thoracentesis needle/catheter was then placed where marked. Approximately 1,300 mL of yellowish colored fluid was removed.  The needle/catheter was then withdrawn.   The patient tolerated the procedure well and there were no immediate complications. Specimen(s) sent to the laboratory for further evaluation, per the requesting team.       Uneventful thoracentesis, as detailed above. Right pleural space, 1,300 mL   I personally performed and/or directly supervised this study and was present for the entire  procedure.   Performed and dictated at [facility].   Signed by: Alex Tabares 4/18/2025 3:15 PM Dictation workstation:   [id number]    Transthoracic Echo (TTE) Complete  Result Date: 4/18/2025   [facility], [address] [place] [address]              Tel [phone] and Fax [phone] TRANSTHORACIC ECHOCARDIOGRAM REPORT  Patient Name:       PHOENIX DAVIS   Reading Physician:    [id number] Arriaga DO Study Date:         4/18/2025           Ordering Provider:    [id number] ARRIAGA MRN/PID:            [id number]            Fellow: Accession#:         [id number]        Nurse: Date of Birth/Age:  11/22/1934 / 89     Sonographer:          Maria Fernanda Stockton RDCS                     years Gender assigned at                     Additional Staff: Birth: Height:             160.02 cm           Admit Date:           4/17/2025 Weight:             77.11 kg            Admission Status:     Inpatient -                                                               Routine BSA / BMI:          1.80 m2 / 30.11     Encounter#:           [id number]                     kg/m2 Blood Pressure:     131/78 mmHg         Department Location:  [facility] Non                                                               Invasive Study Type:    TRANSTHORACIC ECHO (TTE) COMPLETE Diagnosis/ICD: Heart failure, unspecified-I50.9; Abnormal electrocardiogram                [ECG] [EKG]-R94.31 Indication:    CHF, abn EKG, afib CPT Code:      Echo Complete w Full Doppler-[id number] Patient History: Pertinent History: HTN and A-Fib. Atrial flutter, bradycardia, unstable angina,                    NSTEMI. Study Detail: The following Echo studies were performed: 2D, M-Mode, Doppler and               color flow. Unable to obtain subcostal and poor subcostal window               view.   PHYSICIAN INTERPRETATION: Left Ventricle: Left ventricular ejection fraction is normal, calculated by Rodriguez's biplane at 60%. There are no regional left ventricular wall motion abnormalities. The left ventricular cavity size is normal. There is mildly increased septal and normal posterior left ventricular wall thickness. There is left ventricular concentric remodeling. Left ventricular diastolic filling cannot be determined due to atrial fibrillation/flutter. Left Atrium: The left atrial size is severely dilated. Right Ventricle: The right ventricle is normal in size. There is normal right ventricular global systolic function. Right Atrium: The right atrium is severely dilated. Aortic Valve: The aortic valve is trileaflet. The aortic valve dimensionless index is 0.48. There is no evidence of aortic valve regurgitation. The peak instantaneous gradient of the aortic valve is 21 mmHg. The mean gradient of the aortic valve is 12 mmHg. Mitral Valve: The mitral valve is normal in structure. The mitral valve spectral Doppler A-wave is absent, consistent with atrial fibrillation. There is trace mitral valve regurgitation. Tricuspid Valve: The tricuspid valve is structurally normal. There is trace tricuspid regurgitation. The Doppler estimated RVSP is mildly elevated at 46.4 mmHg. Pulmonic Valve: The pulmonic valve is structurally normal. There is no indication of pulmonic valve regurgitation. Pericardium: Small to moderate pericardial effusion. Aorta: The aortic root is normal. Systemic Veins: The inferior vena cava was not well visualized. In comparison to the previous echocardiogram(s): Compared with study dated 6/28/2023, pericardial effusion has increased.  CONCLUSIONS:  1. Left ventricular ejection fraction is normal, calculated by Rodriguez's biplane at 60%.  2. The left atrial size is severely dilated.  3. The right atrium is severely dilated.  4. Small to moderate pericardial effusion.  5. Absent A-wave on MV  spectral Doppler tracing, consistent with atrial fibrillation.  6. Mildly elevated right ventricular systolic pressure.  7. Compared with study dated 2023, pericardial effusion has increased. QUANTITATIVE DATA SUMMARY:  2D MEASUREMENTS:          Normal Ranges: LAs:             5.77 cm  (2.7-4.0cm) IVSd:            1.22 cm  (0.6-1.1cm) LVPWd:           0.98 cm  (0.6-1.1cm) LVIDd:           4.29 cm  (3.9-5.9cm) LVIDs:           2.89 cm LV Mass Index:   90 g/m2 LVEDV Index:     43 ml/m2 LV % FS          32.7 %  LEFT ATRIUM:                  Normal Ranges: LA Vol A4C:        141.3 ml   (22+/-6mL/m2) LA Vol A2C:        156.9 ml LA Vol BP:         159.1 ml LA Vol Index A4C:  78.3ml/m2 LA Vol Index A2C:  86.9 ml/m2 LA Vol Index BP:   88.1 ml/m2 LA Area A4C:       36.7 cm2 LA Area A2C:       36.2 cm2 LA Major Axis A4C: 8.1 cm LA Major Axis A2C: 7.1 cm LA Volume Index:   88.1 ml/m2 LA Vol A4C:        134.4 ml LA Vol A2C:        147.7 ml LA Vol Index BSA:  78.2 ml/m2  RIGHT ATRIUM:                 Normal Ranges: RA Vol A4C:        113.8 ml   (8.3-19.5ml) RA Vol Index A4C:  63.1 ml/m2 RA Area A4C:       30.4 cm2 RA Major Axis A4C: 6.9 cm  M-MODE MEASUREMENTS:         Normal Ranges: LAs:                 6.23 cm (2.7-4.0cm)  AORTA MEASUREMENTS:         Normal Ranges: Ao Sinus, d:        3.10 cm (2.1-3.5cm) Ao STJ, d:          2.50 cm (1.7-3.4cm) Asc Ao, d:          3.20 cm (2.1-3.4cm)  LV SYSTOLIC FUNCTION:                      Normal Ranges: EF-A4C View:    62 % (>=55%) EF-A2C View:    57 % EF-Biplane:     60 % LV EF Reported: 60 %  AORTIC VALVE:                      Normal Ranges: AoV Vmax:                2.27 m/s  (<=1.7m/s) AoV Peak P.6 mmHg (<20mmHg) AoV Mean P.1 mmHg (1.7-11.5mmHg) LVOT Max Rex:            0.94 m/s  (<=1.1m/s) AoV VTI:                 39.15 cm  (18-25cm) LVOT VTI:                18.61 cm LVOT Diameter:           2.29 cm   (1.8-2.4cm) AoV Area, VTI:           1.96 cm2   (2.5-5.5cm2) AoV Area,Vmax:           1.71 cm2  (2.5-4.5cm2) AoV Dimensionless Index: 0.48  RIGHT VENTRICLE: RV Basal 4.50 cm RV Mid   3.80 cm RV Major 6.1 cm TAPSE:   18.0 mm RV s'    0.16 m/s  TRICUSPID VALVE/RVSP:          Normal Ranges: Peak TR Velocity:     3.30 m/s Est. RA Pressure:     3 mmHg RV Syst Pressure:     46 mmHg  (< 30mmHg)  PULMONIC VALVE:          Normal Ranges: PV Accel Time:  63 msec  (>120ms) PV Max Rex:     1.2 m/s  (0.6-0.9m/s) PV Max P.1 mmHg  AORTA: Asc Ao Diam 3.22 cm  [id number] Trino June DO Electronically signed on 2025 at 12:33:15 PM  ** Final **     XR chest 1 view  Result Date: 2025  Interpreted By:  Rainer Wyatt, STUDY: XR CHEST 1 VIEW;  2025 11:19 am   INDICATION: Signs/Symptoms:S/P Thora.   COMPARISON: Chest x-ray from 2025. CT scan chest from 2024.   ACCESSION NUMBER(S): [id number]   ORDERING CLINICIAN: DIEGO JONES   TECHNIQUE: Single AP portable view of the chest was obtained.   FINDINGS: MEDIASTINUM/ LUNGS/ MAGNO: Suboptimal level of inspiration. There is still a small to moderate-sized right pleural effusion, improved. There is persistent mild atelectasis at the base of the aerated right lung. No left pleural effusion. Scant atelectasis at the retrocardiac left lung base. Cardiomegaly without vascular congestion. No pneumothorax. No tracheal deviation. No abnormal hilar fullness or gross mass on either side.   BONES: No lytic or blastic destructive bone lesion.   UPPER ABDOMEN: Grossly intact.       As above. Status post right thoracentesis. No pneumothorax.   MACRO: None   Signed by: Rainer Wyatt 2025 11:24 AM Dictation workstation:   [id number]    ECG 12 lead  Result Date: 2025  Atrial fibrillation Low voltage QRS Abnormal ECG When compared with ECG of 2024 11:12, No significant change was found See ED provider note for full interpretation and clinical correlation Confirmed by Lisa Fisher ([id number]) on 2025  2:02:32 PM    XR chest 1 view  Result Date: 4/17/2025  Interpreted By:  Denis Morales, STUDY: XR CHEST 1 VIEW  4/17/2025 12:50 pm   INDICATION: Signs/Symptoms:sob   COMPARISON: 11/13/2024   ACCESSION NUMBER(S): [id number]   ORDERING CLINICIAN: MARINA CASTANO   TECHNIQUE: A single AP portable radiograph of the chest was obtained.   FINDINGS: Multiple cardiac monitoring leads are seen over the chest.  A moderate-sized right-sided pleural effusion is present. Right basilar airspace consolidation is seen and may represent atelectasis and/or pneumonia. No pneumothorax is identified. The cardiac silhouette is within normal limits for size.       Right-sided pleural effusion and airspace consolidation, as above. Clinical correlation and continued follow-up until clearing is recommended.   MACRO: None.   Signed by: Denis Morales 4/17/2025 1:33 PM Dictation workstation:   [id number]               Assessment & Plan  Biventricular congestive heart failure  89-year-old gentleman with known history of severe coronary artery disease status post PCI of LAD in 2014 and NSTEMI in 2022, history of chronic atrial fibrillation, hypertension, type 2 diabetes mellitus presents with increasing shortness of breath and leg edema, orthopnea and moderate-size increased right pleural effusion.  Patient has been taking his Lasix and metolazone weekly as prescribed as an outpatient.  His chest x-ray was consistent with right pleural effusion significantly larger than before and EKG shows atrial fibrillation with slow ventricular response and his BN peptide is elevated    1.  Acute on chronic diastolic heart failure with increasing right edema and moderate-sized right pleural effusion and acute hypoxic respiratory failure  Chest x-ray consistent with large right pleural effusion, had thoracocentesis on April 18, 2025 and 1300 cc of pleural fluid was removed, pleural fluid cytology analysis is pending.  IV diuretics Lasix 40 mg IV twice daily,  cardiology input appreciated.  Echocardiogram reviewed consistent with diastolic dysfunction.  Significant improvement, leg edema resolved diuresed good, will reduce Lasix to 40 mg po bid from tomorrow.  Serum potassium 3.4, patient receiving 20 mg of potassium daily.     2.  Chronic atrial fibrillation with slow ventricular response and blood pressures were rather low on admission therefore his diltiazem and metoprolol is being held, however today heart rate is around 100 to110 bpm and blood pressures have improved therefore will resume metoprolol.  Patient has been on Coumadin and INR is 2.3, on Coumadin 5 mg daily     3.  Diabetes mellitus type 2 stable, continue with current regime of metformin and sliding scale insulin if necessary, most recent A1c was 6.4.     4.Previous history of NSTEMI and LAD PCI.     Reviewed all labs and imaging studies discussed the plan of treatment with patient and his wife was present in the room, and she is updated with patient's condition     I spent 35 minutes in the professional and overall care of this patient.         Chon Lanza MD         [1] aspirin, 81 mg, oral, Daily  atorvastatin, 80 mg, oral, Daily  cholecalciferol, 50 mcg, oral, Daily  [Held by provider] dilTIAZem ER, 360 mg, oral, Nightly  [Held by provider] doxazosin, 6 mg, oral, Daily  furosemide, 40 mg, intravenous, BID  insulin lispro, 0-5 Units, subcutaneous, TID AC  ipratropium-albuteroL, 3 mL, nebulization, TID  [Held by provider] lisinopril, 10 mg, oral, Daily  metFORMIN XR, 500 mg, oral, Daily  metoprolol tartrate, 12.5 mg, oral, Nightly  metoprolol tartrate, 25 mg, oral, Daily  oxygen, , inhalation, Continuous - Inhalation  pantoprazole, 40 mg, oral, Daily before breakfast  potassium chloride CR, 20 mEq, oral, Daily  warfarin, 5 mg, oral, Daily  [2]    [3] PRN medications: acetaminophen **OR** acetaminophen **OR** acetaminophen, alum-mag hydroxide-simeth, dextromethorphan-guaifenesin,  ipratropium-albuteroL, melatonin, ondansetron ODT **OR** ondansetron, polyethylene glycol

## 2025-04-19 NOTE — PROGRESS NOTES
Occupational Therapy    Evaluation/Treatment    Patient Name: Ariel Rodríguez  MRN: [id number]  Department: [facility] [id number]  Room: [address] [place] [address]  Today's Date: 04/19/25  Time Calculation  Start Time: 1345  Stop Time: 1411  Time Calculation (min): 26 min       Assessment:  OT Assessment: 89 yo presenting with generalized weakness and decr. core strength; balance impairments present, decr. safety and independence witha ll ADLs, transfers, functional mobility. Anticipate HIGH intensity OT at dc, pt very independent with ADLs/IADLs and amb without AD prior to admission. Will follow per POC  Prognosis: Excellent  Barriers to Discharge Home: Physical needs  Physical Needs: In-home setup navigation limited by function/safety, 24hr ADL assistance needed, 24hr mobility assistance needed, High falls risk due to function or environment, Ambulating household distances limited by function/safety  Evaluation/Treatment Tolerance: Patient tolerated treatment well  Medical Staff Made Aware: Yes  End of Session Communication: Bedside nurse  End of Session Patient Position: Alarm on, Up in chair  OT Assessment Results: Decreased ADL status, Decreased safe judgment during ADL, Decreased cognition, Decreased functional mobility, Decreased gross motor control, Decreased trunk control for functional activities, Decreased IADLs, Decreased endurance  Prognosis: Excellent  Barriers to Discharge: None  Evaluation/Treatment Tolerance: Patient tolerated treatment well  Medical Staff Made Aware: Yes  Strengths: Ability to acquire knowledge, Attitude of self, Coping skills, Housing layout, Premorbid level of function  Barriers to Participation: Comorbidities  Plan:  Treatment Interventions: ADL retraining, Functional transfer training, UE strengthening/ROM, Endurance training, Patient/family training, Equipment evaluation/education, Neuromuscular reeducation, Fine motor coordination activities, Compensatory technique education, Cognitive reorientation  OT  Frequency: 4 times per week  OT Discharge Recommendations: High intensity level of continued care  OT Recommended Transfer Status: Moderate assist, Assist of 1  OT - OK to Discharge: Yes  Treatment Interventions: ADL retraining, Functional transfer training, UE strengthening/ROM, Endurance training, Patient/family training, Equipment evaluation/education, Neuromuscular reeducation, Fine motor coordination activities, Compensatory technique education, Cognitive reorientation    Subjective   Current Problem:  1. Biventricular congestive heart failure        2. Pleural effusion on right        3. Other congestive heart failure  Transthoracic Echo (TTE) Complete    Transthoracic Echo (TTE) Complete      4. Abnormal electrocardiogram (ECG) (EKG)  Transthoracic Echo (TTE) Complete    Transthoracic Echo (TTE) Complete        General:   OT Received On: 04/19/25  General  Reason for Referral: decline in ADls; 89 yo presenting with worsening SOB and LE edema. Acute on chronic diastolic heart failure with increasing right edema and moderate-sized right pleural effusion and acute hypoxic respiratory failure. s/p Thoracentesis 4/18 with 1300cc fluid removal  Referred By: Loni Galo, APRN-CNP  Past Medical History Relevant to Rehab: CAD s/p PCI, NSTEMI 8/2014, HFpEF, A-Fib on Coumadin, HTN, HLD, DMT2   Prior to Session Communication: Bedside nurse  Patient Position Received: Bed, 3 rail up, Alarm on  Preferred Learning Style: auditory, kinesthetic, verbal  General Comment: Pt pleasant and agreeable to OT eval. Pt's wife present for session   Precautions:  Hearing/Visual Limitations: hard of hearing  Medical Precautions: Fall precautions    Pain:  Pain Assessment  Pain Assessment: 0-10  0-10 (Numeric) Pain Score: 5 - Moderate pain  Pain Type: Chronic pain  Pain Location: Ankle  Pain Orientation:  (bilateral (old injuries))  Pain Interventions: Repositioned  Response to Interventions: Increase in pain (inc with weight bearing  activity, RN aware)    Objective   Cognition:  Overall Cognitive Status: Within Functional Limits  Orientation Level: Oriented X4  Attention: Within Functional Limits  Insight: Moderate (poor awareness of balance/safety defucits during functional mobility/ambulation)  Impulsive: Within functional limits    Home Living:  Type of Home: House  Lives With: Spouse  Home Adaptive Equipment: Cane, Walker rolling or standard (rollator)  Home Layout: Two level, Able to live on main level with bedroom/bathroom (rarely uses second floor)  Home Access: Stairs to enter with rails  Bathroom Shower/Tub: Walk-in shower  Bathroom Toilet: Adaptive toilet seating  Bathroom Equipment: Raised toilet seat with rails  Prior Function:  Level of [place]: Independent with ADLs and functional transfers, Independent with homemaking with ambulation  ADL Assistance: Independent  Homemaking Assistance:  (folds laundry and does dishes, spouse completes other household tasks)  Ambulatory Assistance: Independent (using cane PRN for community distances)  Prior Function Comments: Denies any falls in the past 6 months.  IADL History:  Homemaking Responsibilities: Yes  IADL Comments: +drives and assists with IADls  ADL:  Eating Assistance: Independent  LE Dressing Assistance: Minimal  LE Dressing Deficit: Increased time to complete, Don/doff R sock, Don/doff L sock (simulated at EOB, inc effort and difficulty performing figure four technique though able to complete; slightly retropulsive)  Toileting Assistance with Device: Stand by  Toileting Deficit: Setup, Perineal hygiene (inc assist for toileting as pt not safe to ambualte bathroom distances, recommend bsc use at this time)  Functional Assistance: Moderate  Functional Deficit: Verbal cueing, Supervision/safety, Commode transfer, Increased time to complete  ADL Comments: very retropulsive in standing; high falls risk, constant trunk support required to prevent LOB    Activity  Tolerance:  Endurance: Tolerates 10 - 20 min exercise with multiple rests    Functional Standing Tolerance: <2 min due to overt retro balance impairments in static and dynamic standing     Bed Mobility/Transfers: Bed Mobility  Bed Mobility: Yes  Bed Mobility 1  Bed Mobility 1: Supine to sitting  Level of Assistance 1: Close supervision, Minimal verbal cues  Bed Mobility Comments 1: increased time to complete, use of bed rails    Transfers  Transfer: Yes  Transfer 1  Technique 1: Sit to stand, Stand to sit  Transfer Device 1: Walker  Transfer Level of Assistance 1: Minimum assistance, Maximum verbal cues, Maximum tactile cues  Trials/Comments 1: max cues for hannd placement, fair eccentric lowering control    Functional Mobility:  Functional Mobility  Functional Mobility Performed: Yes  Functional Mobility 1  Surface 1: Level tile  Device 1: Rolling walker  Functional Mobility Support Devices: Gait belt  Assistance 1: Moderate assistance, Maximum verbal cues, Maximum tactile cues  Comments 1: ranging from CGA (static standing balance support at times) to mod trunk support to prevent retropulsive LOB in static and dynamic standing for transer from EOB to chair. Pt with motor planning ansequencing impairments when using ww; max v/t cues for technique and anterior weight shift  Sitting Balance:  Static Sitting Balance  Static Sitting-Level of Assistance: Close supervision  Dynamic Sitting Balance  Dynamic Sitting-Level of Assistance: Contact guard  Dynamic Sitting-Balance:  (when attempting to simulate figure four technique seated EOB)  Standing Balance:  Static Standing Balance  Static Standing-Balance Support: Bilateral upper extremity supported  Static Standing-Level of Assistance: Moderate assistance  Dynamic Standing Balance  Dynamic Standing-Balance Support: Bilateral upper extremity supported  Dynamic Standing-Level of Assistance: Moderate assistance  Dynamic Standing-Comments: increased difficulty with anterior  weight shift and providing UE support through walker to compensate for weakness    Vision:Vision - Basic Assessment  Current Vision: Wears glasses all the time      Sensation:  Sensation Comment: Carpal tunnel syndrome in pierre UEs  Strength:  Strength Comments: generalized weakness and decr. core strength    Perception:  Inattention/Neglect: Appears intact  Coordination:  Movements are Fluid and Coordinated: Yes     Hand Function:  Hand Function  Gross Grasp: Functional  Coordination: Functional    Extremities: RUE   RUE : Within Functional Limits, LUE   LUE: Within Functional Limits, RLE   RLE : Within Functional Limits, and LLE   LLE : Within Functional Limits    Outcome Measures: [facility] Daily Activity  Putting on and taking off regular lower body clothing: A lot  Bathing (including washing, rinsing, drying): A lot  Putting on and taking off regular upper body clothing: A little  Toileting, which includes using toilet, bedpan or urinal: A lot  Taking care of personal grooming such as brushing teeth: A little  Eating Meals: None  Daily Activity - Total Score: 16      Education Documentation  Body Mechanics, taught by Don Cheek OT at 4/19/2025  3:34 PM.  Learner: Family, Patient  Readiness: Acceptance  Method: Explanation  Response: Needs Reinforcement    Precautions, taught by Don Cheek OT at 4/19/2025  3:34 PM.  Learner: Family, Patient  Readiness: Acceptance  Method: Explanation  Response: Needs Reinforcement    ADL Training, taught by Don Cheek OT at 4/19/2025  3:34 PM.  Learner: Family, Patient  Readiness: Acceptance  Method: Explanation  Response: Needs Reinforcement    Education Comments  No comments found.      Goals:  Encounter Problems       Encounter Problems (Active)       ADLs       Patient with complete upper body dressing with independent level of assistance donning and doffing all UE clothes while edge of bed        Start:  04/19/25    Expected End:  05/03/25            Patient  will complete lower body dressing with modified independent level of assistance donning and doffing all LE clothes  with PRN adaptive equipment while edge of bed  and standing       Start:  04/19/25    Expected End:  05/03/25            Patient will complete daily grooming tasks  with contact guard assist level of assistance and PRN adaptive equipment while standing.       Start:  04/19/25    Expected End:  05/03/25            Patient will complete toileting including hygiene clothing management/hygiene with minimal assist  level of assistance and raised toilet seat and grab bars.       Start:  04/19/25    Expected End:  05/03/25               BALANCE       Pt will maintain dynamic standing balance during ADL task with contact guard assist level of assistance in order to demonstrate decreased risk of falling and improved postural control.       Start:  04/19/25    Expected End:  05/03/25            Patient will tolerate standing for 10 minutes to contact guard assist level of assistance with least restrictive device in order to improve functional activity tolerance for ADL tasks.       Start:  04/19/25    Expected End:  05/03/25               EXERCISE/STRENGTHENING       Patient will be educated on BUE HEP for increased ADL performance.       Start:  04/19/25    Expected End:  05/03/25               TRANSFERS       Patient will complete sit to stand transfer with contact guard assist level of assistance and least restrictive device in order to improve safety and prepare for out of bed mobility.       Start:  04/19/25    Expected End:  05/03/25

## 2025-04-19 NOTE — CARE PLAN
Problem: Pain - Adult  Goal: Verbalizes/displays adequate comfort level or baseline comfort level  Outcome: Progressing   The patient's goals for the shift include maintain sp02 greater than 92%    The clinical goals for the shift include pt.ill have good air exchange    Over the shift, the patient did not make progress toward the following goals. Barriers to progression include . Recommendations to address these barriers include .

## 2025-04-20 VITALS
DIASTOLIC BLOOD PRESSURE: 80 MMHG | RESPIRATION RATE: 18 BRPM | BODY MASS INDEX: 30.12 KG/M2 | TEMPERATURE: 99.1 F | HEART RATE: 117 BPM | HEIGHT: 63 IN | WEIGHT: 170 LBS | OXYGEN SATURATION: 99 % | SYSTOLIC BLOOD PRESSURE: 131 MMHG

## 2025-04-20 LAB
ANION GAP SERPL CALC-SCNC: 13 MMOL/L (ref 10–20)
BACTERIA FLD CULT: NORMAL
BUN SERPL-MCNC: 27 MG/DL (ref 6–23)
CALCIUM SERPL-MCNC: 8.7 MG/DL (ref 8.6–10.3)
CHLORIDE SERPL-SCNC: 90 MMOL/L (ref 98–107)
CO2 SERPL-SCNC: 36 MMOL/L (ref 21–32)
CREAT SERPL-MCNC: 0.96 MG/DL (ref 0.5–1.3)
EGFRCR SERPLBLD CKD-EPI 2021: 75 ML/MIN/1.73M*2
ERYTHROCYTE [DISTWIDTH] IN BLOOD BY AUTOMATED COUNT: 15.3 % (ref 11.5–14.5)
FUNGUS SPEC FUNGUS STN: NORMAL
GLUCOSE BLD MANUAL STRIP-MCNC: 137 MG/DL (ref 74–99)
GLUCOSE BLD MANUAL STRIP-MCNC: 154 MG/DL (ref 74–99)
GLUCOSE BLD MANUAL STRIP-MCNC: 170 MG/DL (ref 74–99)
GLUCOSE BLD MANUAL STRIP-MCNC: 176 MG/DL (ref 74–99)
GLUCOSE SERPL-MCNC: 117 MG/DL (ref 74–99)
GRAM STN SPEC: NORMAL
GRAM STN SPEC: NORMAL
HCT VFR BLD AUTO: 33.6 % (ref 41–52)
HGB BLD-MCNC: 11 G/DL (ref 13.5–17.5)
INR PPP: 2.3 (ref 0.9–1.1)
MAGNESIUM SERPL-MCNC: 1.85 MG/DL (ref 1.6–2.4)
MCH RBC QN AUTO: 28.9 PG (ref 26–34)
MCHC RBC AUTO-ENTMCNC: 32.7 G/DL (ref 32–36)
MCV RBC AUTO: 88 FL (ref 80–100)
NRBC BLD-RTO: 0 /100 WBCS (ref 0–0)
PLATELET # BLD AUTO: 160 X10*3/UL (ref 150–450)
POTASSIUM SERPL-SCNC: 3.7 MMOL/L (ref 3.5–5.3)
PROTHROMBIN TIME: 25.9 SECONDS (ref 9.8–12.4)
RBC # BLD AUTO: 3.8 X10*6/UL (ref 4.5–5.9)
SODIUM SERPL-SCNC: 135 MMOL/L (ref 136–145)
WBC # BLD AUTO: 9.2 X10*3/UL (ref 4.4–11.3)

## 2025-04-20 PROCEDURE — 82947 ASSAY GLUCOSE BLOOD QUANT: CPT

## 2025-04-20 PROCEDURE — 2500000001 HC RX 250 WO HCPCS SELF ADMINISTERED DRUGS (ALT 637 FOR MEDICARE OP): Performed by: INTERNAL MEDICINE

## 2025-04-20 PROCEDURE — 80051 ELECTROLYTE PANEL: CPT | Performed by: NURSE PRACTITIONER

## 2025-04-20 PROCEDURE — 36415 COLL VENOUS BLD VENIPUNCTURE: CPT | Performed by: NURSE PRACTITIONER

## 2025-04-20 PROCEDURE — 2500000004 HC RX 250 GENERAL PHARMACY W/ HCPCS (ALT 636 FOR OP/ED): Mod: JZ

## 2025-04-20 PROCEDURE — 85027 COMPLETE CBC AUTOMATED: CPT | Performed by: NURSE PRACTITIONER

## 2025-04-20 PROCEDURE — 2500000002 HC RX 250 W HCPCS SELF ADMINISTERED DRUGS (ALT 637 FOR MEDICARE OP, ALT 636 FOR OP/ED)

## 2025-04-20 PROCEDURE — 2500000001 HC RX 250 WO HCPCS SELF ADMINISTERED DRUGS (ALT 637 FOR MEDICARE OP): Performed by: NURSE PRACTITIONER

## 2025-04-20 PROCEDURE — 99233 SBSQ HOSP IP/OBS HIGH 50: CPT

## 2025-04-20 PROCEDURE — 94640 AIRWAY INHALATION TREATMENT: CPT

## 2025-04-20 PROCEDURE — 2500000002 HC RX 250 W HCPCS SELF ADMINISTERED DRUGS (ALT 637 FOR MEDICARE OP, ALT 636 FOR OP/ED): Performed by: NURSE PRACTITIONER

## 2025-04-20 PROCEDURE — 2500000004 HC RX 250 GENERAL PHARMACY W/ HCPCS (ALT 636 FOR OP/ED): Mod: JZ | Performed by: INTERNAL MEDICINE

## 2025-04-20 PROCEDURE — 83735 ASSAY OF MAGNESIUM: CPT | Performed by: NURSE PRACTITIONER

## 2025-04-20 PROCEDURE — 85610 PROTHROMBIN TIME: CPT

## 2025-04-20 PROCEDURE — 99232 SBSQ HOSP IP/OBS MODERATE 35: CPT | Performed by: INTERNAL MEDICINE

## 2025-04-20 PROCEDURE — 2500000005 HC RX 250 GENERAL PHARMACY W/O HCPCS: Performed by: INTERNAL MEDICINE

## 2025-04-20 PROCEDURE — 2060000001 HC INTERMEDIATE ICU ROOM DAILY

## 2025-04-20 PROCEDURE — 2500000002 HC RX 250 W HCPCS SELF ADMINISTERED DRUGS (ALT 637 FOR MEDICARE OP, ALT 636 FOR OP/ED): Mod: JZ | Performed by: INTERNAL MEDICINE

## 2025-04-20 RX ORDER — IPRATROPIUM BROMIDE AND ALBUTEROL SULFATE 2.5; .5 MG/3ML; MG/3ML
3 SOLUTION RESPIRATORY (INHALATION)
Status: DISCONTINUED | OUTPATIENT
Start: 2025-04-20 | End: 2025-04-20

## 2025-04-20 RX ORDER — FUROSEMIDE 10 MG/ML
40 INJECTION INTRAMUSCULAR; INTRAVENOUS
Status: DISCONTINUED | OUTPATIENT
Start: 2025-04-21 | End: 2025-04-21

## 2025-04-20 RX ORDER — IPRATROPIUM BROMIDE AND ALBUTEROL SULFATE 2.5; .5 MG/3ML; MG/3ML
3 SOLUTION RESPIRATORY (INHALATION) EVERY 6 HOURS PRN
Status: DISCONTINUED | OUTPATIENT
Start: 2025-04-20 | End: 2025-04-21

## 2025-04-20 RX ORDER — DIGOXIN 0.25 MG/ML
250 INJECTION INTRAMUSCULAR; INTRAVENOUS EVERY 6 HOURS
Status: COMPLETED | OUTPATIENT
Start: 2025-04-20 | End: 2025-04-21

## 2025-04-20 RX ORDER — METOPROLOL TARTRATE 50 MG/1
50 TABLET ORAL 2 TIMES DAILY
Status: DISCONTINUED | OUTPATIENT
Start: 2025-04-20 | End: 2025-04-23 | Stop reason: HOSPADM

## 2025-04-20 RX ORDER — DIGOXIN 0.25 MG/ML
500 INJECTION INTRAMUSCULAR; INTRAVENOUS ONCE
Status: COMPLETED | OUTPATIENT
Start: 2025-04-20 | End: 2025-04-20

## 2025-04-20 RX ORDER — METOPROLOL TARTRATE 25 MG/1
25 TABLET, FILM COATED ORAL 2 TIMES DAILY
Status: DISCONTINUED | OUTPATIENT
Start: 2025-04-20 | End: 2025-04-20

## 2025-04-20 RX ADMIN — METFORMIN ER 500 MG 500 MG: 500 TABLET ORAL at 09:11

## 2025-04-20 RX ADMIN — ATORVASTATIN CALCIUM 80 MG: 80 TABLET, FILM COATED ORAL at 09:12

## 2025-04-20 RX ADMIN — INSULIN LISPRO 1 UNITS: 100 INJECTION, SOLUTION INTRAVENOUS; SUBCUTANEOUS at 12:05

## 2025-04-20 RX ADMIN — IPRATROPIUM BROMIDE AND ALBUTEROL SULFATE 3 ML: 2.5; .5 SOLUTION RESPIRATORY (INHALATION) at 08:30

## 2025-04-20 RX ADMIN — METOPROLOL TARTRATE 5 MG: 5 INJECTION INTRAVENOUS at 02:23

## 2025-04-20 RX ADMIN — Medication 50 MCG: at 06:56

## 2025-04-20 RX ADMIN — DIGOXIN 250 MCG: 250 INJECTION, SOLUTION INTRAMUSCULAR; INTRAVENOUS; PARENTERAL at 18:04

## 2025-04-20 RX ADMIN — FUROSEMIDE 80 MG: 10 INJECTION, SOLUTION INTRAMUSCULAR; INTRAVENOUS at 09:15

## 2025-04-20 RX ADMIN — WARFARIN SODIUM 5 MG: 5 TABLET ORAL at 18:04

## 2025-04-20 RX ADMIN — METOPROLOL TARTRATE 25 MG: 25 TABLET, FILM COATED ORAL at 09:12

## 2025-04-20 RX ADMIN — METOPROLOL TARTRATE 50 MG: 50 TABLET, FILM COATED ORAL at 21:01

## 2025-04-20 RX ADMIN — PANTOPRAZOLE SODIUM 40 MG: 40 TABLET, DELAYED RELEASE ORAL at 06:55

## 2025-04-20 RX ADMIN — INSULIN LISPRO 1 UNITS: 100 INJECTION, SOLUTION INTRAVENOUS; SUBCUTANEOUS at 18:04

## 2025-04-20 RX ADMIN — DIGOXIN 500 MCG: 250 INJECTION, SOLUTION INTRAMUSCULAR; INTRAVENOUS; PARENTERAL at 12:05

## 2025-04-20 RX ADMIN — FUROSEMIDE 80 MG: 10 INJECTION, SOLUTION INTRAMUSCULAR; INTRAVENOUS at 18:04

## 2025-04-20 RX ADMIN — Medication 3 L/MIN: at 21:02

## 2025-04-20 RX ADMIN — POTASSIUM CHLORIDE 20 MEQ: 1500 TABLET, EXTENDED RELEASE ORAL at 09:12

## 2025-04-20 RX ADMIN — ASPIRIN 81 MG: 81 TABLET, COATED ORAL at 09:12

## 2025-04-20 ASSESSMENT — COGNITIVE AND FUNCTIONAL STATUS - GENERAL
STANDING UP FROM CHAIR USING ARMS: A LITTLE
HELP NEEDED FOR BATHING: A LITTLE
TOILETING: A LITTLE
PERSONAL GROOMING: A LITTLE
MOVING TO AND FROM BED TO CHAIR: A LITTLE
MOBILITY SCORE: 18
MOVING FROM LYING ON BACK TO SITTING ON SIDE OF FLAT BED WITH BEDRAILS: A LITTLE
CLIMB 3 TO 5 STEPS WITH RAILING: A LITTLE
DAILY ACTIVITIY SCORE: 18
EATING MEALS: A LITTLE
TURNING FROM BACK TO SIDE WHILE IN FLAT BAD: A LITTLE
WALKING IN HOSPITAL ROOM: A LITTLE
DRESSING REGULAR LOWER BODY CLOTHING: A LITTLE
DAILY ACTIVITIY SCORE: 18
DRESSING REGULAR UPPER BODY CLOTHING: A LITTLE
CLIMB 3 TO 5 STEPS WITH RAILING: A LITTLE
TURNING FROM BACK TO SIDE WHILE IN FLAT BAD: A LITTLE
PERSONAL GROOMING: A LITTLE
WALKING IN HOSPITAL ROOM: A LITTLE
MOVING TO AND FROM BED TO CHAIR: A LITTLE
STANDING UP FROM CHAIR USING ARMS: A LITTLE
HELP NEEDED FOR BATHING: A LITTLE
MOBILITY SCORE: 18
DRESSING REGULAR LOWER BODY CLOTHING: A LITTLE
DRESSING REGULAR UPPER BODY CLOTHING: A LITTLE
EATING MEALS: A LITTLE
MOVING FROM LYING ON BACK TO SITTING ON SIDE OF FLAT BED WITH BEDRAILS: A LITTLE
TOILETING: A LITTLE

## 2025-04-20 ASSESSMENT — PAIN - FUNCTIONAL ASSESSMENT
PAIN_FUNCTIONAL_ASSESSMENT: 0-10

## 2025-04-20 ASSESSMENT — PAIN SCALES - GENERAL
PAINLEVEL_OUTOF10: 0 - NO PAIN

## 2025-04-20 NOTE — CARE PLAN
Problem: Pain - Adult  Goal: Verbalizes/displays adequate comfort level or baseline comfort level  Outcome: Progressing   The patient's goals for the shift include maintain sp02 greater than 92%    The clinical goals for the shift include patient remain HDS throughout shift    Over the shift, the patient did not make progress toward the following goals. Barriers to progression include . Recommendations to address these barriers include .

## 2025-04-20 NOTE — PROGRESS NOTES
Subjective Data:  Patient resting, reports feeling better, though noted conversational and minimal exertional dyspnea  Supp oxygen via nc 3 L  Telemetry reveals AF rates 130-140's sustained>patient asymptomatic  Optimal urinary response with increased lasix dosing 80 mg IV BID  24 hour urine output 1800 ml   Will start digoxin IV load and monitor response    Overnight Events:    None reported     Objective Data:  Last Recorded Vitals:  Vitals:    25 0039 25 0400 25 0724 25 0830   BP: 107/68 112/73 124/80    BP Location: Right arm Right arm Right arm    Patient Position: Lying Lying Lying    Pulse: (!) 119  (!) 120    Resp: 18 18 18    Temp: 36.6 °C (97.8 °F) 36.7 °C (98 °F) 36.6 °C (97.9 °F)    TempSrc: Temporal Temporal Temporal    SpO2: 95% 94% 95% 95%   Weight:       Height:         Medical Gas Therapy: Supplemental oxygen  Medical Gas Delivery Method: Nasal cannula  Weight  Av.1 kg (170 lb)  Min: 77.1 kg (170 lb)  Max: 77.1 kg (170 lb)    LABS:  CMP:  Results from last 7 days   Lab Units 25  0550 25  0548 25  0611 25  1343 25  1248   SODIUM mmol/L 135* 135* 136  --  132*   POTASSIUM mmol/L 3.7 3.4* 3.6  --  4.3   CHLORIDE mmol/L 90* 91* 94*  --  99   CO2 mmol/L 36* 36* 35*  --  24   ANION GAP mmol/L 13 11 11  --  13   BUN mg/dL 27* 24* 21  --  26*   CREATININE mg/dL 0.96 0.96 0.96  --  1.10   EGFR mL/min/1.73m*2 75 75 75  --  64   MAGNESIUM mg/dL 1.85 1.84 2.05 1.87 1.86   ALBUMIN g/dL  --   --   --   --  3.6   ALT U/L  --   --   --   --  14   AST U/L  --   --   --   --  22   BILIRUBIN TOTAL mg/dL  --   --   --   --  0.7     CBC:  Results from last 7 days   Lab Units 25  0550 25  0548 25  0611 25  1248   WBC AUTO x10*3/uL 9.2 10.4 6.5 6.5   HEMOGLOBIN g/dL 11.0* 11.1* 10.3* 10.4*   HEMATOCRIT % 33.6* 33.2* 31.8* 31.4*   PLATELETS AUTO x10*3/uL 160 156 164 157   MCV fL 88 88 90 89     COAG:   Results from last 7 days   Lab Units  "04/20/25  0550 04/19/25  0548 04/18/25  0611 04/17/25  1248   INR  2.3* 2.3* 2.6* 3.0*     ABO: No results found for: \"ABO\"  HEME/ENDO:     CARDIAC:   Results from last 7 days   Lab Units 04/18/25  0611 04/17/25  1343 04/17/25  1248   LD U/L 153  --   --    TROPHS ng/L  --  5 5   BNP pg/mL  --   --  466*             Last I/O:    Intake/Output Summary (Last 24 hours) at 4/20/2025 1013  Last data filed at 4/20/2025 0931  Gross per 24 hour   Intake --   Output 1800 ml   Net -1800 ml     Net IO Since Admission: -4,590 mL [04/20/25 1013]         Inpatient Medications:  Scheduled Medications[1]  PRN Medications[2]  Continuous Medications[3]      Physical Exam:  GENERAL: pleasant male, alert and oriented, NAD  SKIN: warm, dry  NECK: mild JVD  CARDIAC: Irregularly irregular + murmur  PULMONARY: + crackles in the bases; supp oxygen in place  ABDOMEN: soft, nondistended  EXTREMITIES: No noted lower extremity edema  NEURO: Alert and oriented x 3.  Grossly normal.  Moves all 4 extremities.     Echocardiogram 4/18/2025:  CONCLUSIONS:   1. Left ventricular ejection fraction is normal, calculated by Rodriguez's biplane at 60%.   2. The left atrial size is severely dilated.   3. The right atrium is severely dilated.   4. Small to moderate pericardial effusion.   5. Absent A-wave on MV spectral Doppler tracing, consistent with atrial fibrillation.   6. Mildly elevated right ventricular systolic pressure.   7. Compared with study dated 6/28/2023, pericardial effusion has increased.     Assessment/Plan  Ariel Rodríguez is a 89 y.o. male with past medical history significant for Severe coronary artery disease s/p prior PCI to proximal and distal LAD and  NSTEMI 8/2014 (medical management), HFpEF, permanent atrial fibrillation on Coumadin, hypertension, Hyperlipidemia, Diabetes mellitus, Right pleural effusion.  Presented with shortness of breath.  Cardiology is consulted for CHF.     Home CV meds: Aspirin 81 mg daily Atorvastatin 80 mg " daily Diltiazem  mg daily Metoprolol tartrate 25 mg twice daily Coumadin 5 mg daily Doxazosin 6 mg daily Ramipril 5 mg daily Furosemide 40 mg daily Metolazone 2.5 mg weekly     I reviewed telemetry, AF rates 130-140's     #Acute hypoxic respiratory failure- Currently on 3 L NC (on no oxygen at home)   #Chronic right pleural effusion s/p thoracentesis with 1300 cc fluid removal 4/18/2025  #Acute on chronic HFpEF-    # Severe CAD s/p prior PCI to proximal and distal LAD and  NSTEMI 8/2014 (medical management)  #Permanent atrial fibrillation on Coumadin- rate controlled   #Hypertension  #Hyperlipidemia     Recommendations after discussion with Cardiologist  Dr. Monroe:  Noted AF RVR on telemetry; start Digoxin IV load as ordered>monitor response (4/20/25)  Will hold on transition to oral digoxin post IV load for now, pend discussion with Dr. June  C/w Lopressor, Coumadin and remain CV meds as ordered  C/w Lasix to 80 mg IV twice daily> monitor response  Echocardiogram with normal EF 60%, and (small to moderate pericardial effusion noted without tamponade> for continued surveillance)  Daily weights, strict I's and O's, monitor renal function, monitor electrolytes, keep K>4.0 and Mag>2.0, supp prn    Code Status:  DNR and No Intubation    I spent 45 minutes in the professional and overall care of this patient.        Angeles Caceres, APRN-CNP       [1]   Scheduled medications   Medication Dose Route Frequency    aspirin  81 mg oral Daily    atorvastatin  80 mg oral Daily    cholecalciferol  50 mcg oral Daily    digoxin  250 mcg intravenous q6h    digoxin  500 mcg intravenous Once    [Held by provider] dilTIAZem ER  360 mg oral Nightly    [Held by provider] doxazosin  6 mg oral Daily    furosemide  80 mg intravenous BID    insulin lispro  0-5 Units subcutaneous TID AC    ipratropium-albuteroL  3 mL nebulization TID    [Held by provider] lisinopril  10 mg oral Daily    metFORMIN XR  500 mg oral Daily     metoprolol tartrate  12.5 mg oral Nightly    metoprolol tartrate  25 mg oral Daily    oxygen   inhalation Continuous - Inhalation    pantoprazole  40 mg oral Daily before breakfast    potassium chloride CR  20 mEq oral Daily    warfarin  5 mg oral Daily   [2]   PRN medications   Medication    acetaminophen    Or    acetaminophen    Or    acetaminophen    alum-mag hydroxide-simeth    dextromethorphan-guaifenesin    ipratropium-albuteroL    melatonin    metoprolol    ondansetron ODT    Or    ondansetron    polyethylene glycol   [3]   Continuous Medications   Medication Dose Last Rate

## 2025-04-20 NOTE — PROGRESS NOTES
"Ariel Rodríguez is a 89 y.o. male on day 3 of admission presenting with Biventricular congestive heart failure.    Subjective   Patient remains along fine.  Sitting out in chair breathing a lot better leg edema had improved significantly, no chest pain, no fever.  Remains in atrial fibrillation with heart rate around 110-120, patient has been digitalized on recommendation of cardiology today, I will also increase his metoprolol to 50 mg twice daily.        Objective     Physical Exam  GENERAL:  Alert, mild distress, cooperative  SKIN:  Skin color, texture, turgor normal. No rashes or lesions.  OROPHARYNX:  Lips, mucosa, and tongue are normal.Teeth and gums, normal. Oropharynx normal.  NECK:  No jugulovenous distention, No carotid bruits, Carotid pulse normal contour, Supple  LUNGS: Improved air exchange, status post right thoracocentesis and 1300 cc of pleural fluid was removed on 4/18/25  CARDIAC: Atrial fibrillation with variable rate around 110 bpm, normal S1 and S2; no rubs,  or gallops, 2/6 systolic ejection murmur left sternal border, CHF improving  ABDOMEN:  Abdomen soft, non-tender, BS normal, No masses or organomegaly  EXTREMETIES:  Extremities normal, no deformities, trace edema,no clubbing   NEURO:  Alert, oriented X 3, Non-focal. PULSES:  2+ radial, 2+ carotid        Last Recorded Vitals  Blood pressure 113/71, pulse (!) 122, temperature 37 °C (98.6 °F), resp. rate 18, height 1.6 m (5' 3\"), weight 77.1 kg (170 lb), SpO2 100%.  Intake/Output last 3 Shifts:  I/O last 3 completed shifts:  In: - (0 mL/kg)   Out: 1900 (24.6 mL/kg) [Urine:1900 (0.7 mL/kg/hr)]  Weight: 77.1 kg     Relevant Results    Scheduled medications  aspirin, 81 mg, oral, Daily  atorvastatin, 80 mg, oral, Daily  cholecalciferol, 50 mcg, oral, Daily  digoxin, 250 mcg, intravenous, q6h  [Held by provider] doxazosin, 6 mg, oral, Daily  furosemide, 80 mg, intravenous, BID  insulin lispro, 0-5 Units, subcutaneous, TID " AC  ipratropium-albuteroL, 3 mL, nebulization, q6h  metFORMIN XR, 500 mg, oral, Daily  metoprolol tartrate, 50 mg, oral, BID  oxygen, , inhalation, Continuous - Inhalation  pantoprazole, 40 mg, oral, Daily before breakfast  potassium chloride CR, 20 mEq, oral, Daily  warfarin, 5 mg, oral, Daily    Continuous medications       PRN medications  PRN medications: acetaminophen **OR** acetaminophen **OR** acetaminophen, alum-mag hydroxide-simeth, dextromethorphan-guaifenesin, melatonin, metoprolol, ondansetron ODT **OR** ondansetron, polyethylene glycol     Results for orders placed or performed during the hospital encounter of 04/17/25 (from the past 96 hours)   ECG 12 lead   Result Value Ref Range    Ventricular Rate 64 BPM    QRS Duration 82 ms    QT Interval 392 ms    QTC Calculation(Bazett) 404 ms    R Axis 81 degrees    T Axis 39 degrees    QRS Count 11 beats    Q Onset 221 ms    T Offset 417 ms    QTC Fredericia 400 ms   CBC and Auto Differential   Result Value Ref Range    WBC 6.5 4.4 - 11.3 x10*3/uL    nRBC 0.0 0.0 - 0.0 /100 WBCs    RBC 3.52 (L) 4.50 - 5.90 x10*6/uL    Hemoglobin 10.4 (L) 13.5 - 17.5 g/dL    Hematocrit 31.4 (L) 41.0 - 52.0 %    MCV 89 80 - 100 fL    MCH 29.5 26.0 - 34.0 pg    MCHC 33.1 32.0 - 36.0 g/dL    RDW 15.8 (H) 11.5 - 14.5 %    Platelets 157 150 - 450 x10*3/uL    Neutrophils % 77.3 40.0 - 80.0 %    Immature Granulocytes %, Automated 0.3 0.0 - 0.9 %    Lymphocytes % 8.0 13.0 - 44.0 %    Monocytes % 11.6 2.0 - 10.0 %    Eosinophils % 2.5 0.0 - 6.0 %    Basophils % 0.3 0.0 - 2.0 %    Neutrophils Absolute 5.01 1.60 - 5.50 x10*3/uL    Immature Granulocytes Absolute, Automated 0.02 0.00 - 0.50 x10*3/uL    Lymphocytes Absolute 0.52 (L) 0.80 - 3.00 x10*3/uL    Monocytes Absolute 0.75 0.05 - 0.80 x10*3/uL    Eosinophils Absolute 0.16 0.00 - 0.40 x10*3/uL    Basophils Absolute 0.02 0.00 - 0.10 x10*3/uL   Basic metabolic panel   Result Value Ref Range    Glucose 160 (H) 74 - 99 mg/dL    Sodium 132  (L) 136 - 145 mmol/L    Potassium 4.3 3.5 - 5.3 mmol/L    Chloride 99 98 - 107 mmol/L    Bicarbonate 24 21 - 32 mmol/L    Anion Gap 13 10 - 20 mmol/L    Urea Nitrogen 26 (H) 6 - 23 mg/dL    Creatinine 1.10 0.50 - 1.30 mg/dL    eGFR 64 >60 mL/min/1.73m*2    Calcium 8.7 8.6 - 10.3 mg/dL   Magnesium   Result Value Ref Range    Magnesium 1.86 1.60 - 2.40 mg/dL   Hepatic function panel   Result Value Ref Range    Albumin 3.6 3.4 - 5.0 g/dL    Bilirubin, Total 0.7 0.0 - 1.2 mg/dL    Bilirubin, Direct 0.3 0.0 - 0.3 mg/dL    Alkaline Phosphatase 85 33 - 136 U/L    ALT 14 10 - 52 U/L    AST 22 9 - 39 U/L    Total Protein 6.7 6.4 - 8.2 g/dL   Protime-INR   Result Value Ref Range    Protime 33.3 (H) 9.8 - 12.4 seconds    INR 3.0 (H) 0.9 - 1.1   B-Type Natriuretic Peptide   Result Value Ref Range     (H) 0 - 99 pg/mL   Blood Gas Venous Full Panel   Result Value Ref Range    POCT pH, Venous 7.37 7.33 - 7.43 pH    POCT pCO2, Venous 51 41 - 51 mm Hg    POCT pO2, Venous 67 (H) 35 - 45 mm Hg    POCT SO2, Venous 93 (H) 45 - 75 %    POCT Oxy Hemoglobin, Venous 91.0 (H) 45.0 - 75.0 %    POCT Hematocrit Calculated, Venous 33.0 (L) 41.0 - 52.0 %    POCT Sodium, Venous 132 (L) 136 - 145 mmol/L    POCT Potassium, Venous 4.8 3.5 - 5.3 mmol/L    POCT Chloride, Venous 98 98 - 107 mmol/L    POCT Ionized Calicum, Venous 1.22 1.10 - 1.33 mmol/L    POCT Glucose, Venous 166 (H) 74 - 99 mg/dL    POCT Lactate, Venous 1.8 0.4 - 2.0 mmol/L    POCT Base Excess, Venous 3.4 (H) -2.0 - 3.0 mmol/L    POCT HCO3 Calculated, Venous 29.5 (H) 22.0 - 26.0 mmol/L    POCT Hemoglobin, Venous 11.0 (L) 13.5 - 17.5 g/dL    POCT Anion Gap, Venous 9.0 (L) 10.0 - 25.0 mmol/L    Patient Temperature 37.0 degrees Celsius    FiO2 36 %   Sars-CoV-2 PCR   Result Value Ref Range    Coronavirus 2019, PCR Not Detected Not Detected   RSV PCR   Result Value Ref Range    RSV PCR Not Detected Not Detected   Influenza A, and B PCR   Result Value Ref Range    Flu A Result Not  Detected Not Detected    Flu B Result Not Detected Not Detected   Troponin I, High Sensitivity, Initial   Result Value Ref Range    Troponin I, High Sensitivity 5 0 - 20 ng/L   Troponin, High Sensitivity, 1 Hour   Result Value Ref Range    Troponin I, High Sensitivity 5 0 - 20 ng/L   Magnesium   Result Value Ref Range    Magnesium 1.87 1.60 - 2.40 mg/dL   POCT GLUCOSE   Result Value Ref Range    POCT Glucose 151 (H) 74 - 99 mg/dL   POCT GLUCOSE   Result Value Ref Range    POCT Glucose 171 (H) 74 - 99 mg/dL   Basic metabolic panel   Result Value Ref Range    Glucose 122 (H) 74 - 99 mg/dL    Sodium 136 136 - 145 mmol/L    Potassium 3.6 3.5 - 5.3 mmol/L    Chloride 94 (L) 98 - 107 mmol/L    Bicarbonate 35 (H) 21 - 32 mmol/L    Anion Gap 11 10 - 20 mmol/L    Urea Nitrogen 21 6 - 23 mg/dL    Creatinine 0.96 0.50 - 1.30 mg/dL    eGFR 75 >60 mL/min/1.73m*2    Calcium 8.7 8.6 - 10.3 mg/dL   CBC   Result Value Ref Range    WBC 6.5 4.4 - 11.3 x10*3/uL    nRBC 0.0 0.0 - 0.0 /100 WBCs    RBC 3.53 (L) 4.50 - 5.90 x10*6/uL    Hemoglobin 10.3 (L) 13.5 - 17.5 g/dL    Hematocrit 31.8 (L) 41.0 - 52.0 %    MCV 90 80 - 100 fL    MCH 29.2 26.0 - 34.0 pg    MCHC 32.4 32.0 - 36.0 g/dL    RDW 15.7 (H) 11.5 - 14.5 %    Platelets 164 150 - 450 x10*3/uL   Magnesium   Result Value Ref Range    Magnesium 2.05 1.60 - 2.40 mg/dL   Protime-INR   Result Value Ref Range    Protime 29.3 (H) 9.8 - 12.4 seconds    INR 2.6 (H) 0.9 - 1.1   Lactate Dehydrogenase   Result Value Ref Range     84 - 246 U/L   Protein, Total   Result Value Ref Range    Total Protein 7.3 6.4 - 8.2 g/dL   Transthoracic Echo (TTE) Complete   Result Value Ref Range    AV pk sen 2.27 m/s    AV mn grad 12 mmHg    LVOT diam 2.29 cm    LA vol index A/L 88.1 ml/m2    Tricuspid annular plane systolic excursion 1.8 cm    LV EF 60 %    RV free wall pk S' 16.00 cm/s    LVIDd 4.29 cm    RVSP 46.4 mmHg    Aortic Valve Area by Continuity of VTI 1.96 cm2    Aortic Valve Area by  Continuity of Peak Velocity 1.71 cm2    AV pk grad 21 mmHg    LV A4C EF 62.2    pH, Body Fluid   Result Value Ref Range    pH, Fluid 7.78 See Below   Sterile Fluid Culture/Smear    Specimen: Pleural; Fluid   Result Value Ref Range    Sterile Fluid Culture/Smear No growth to date     Gram Stain (3+) Moderate Polymorphonuclear leukocytes     Gram Stain No organisms seen    AFB Culture/Smear    Specimen: Pleural; Fluid   Result Value Ref Range    AFB Culture       Culture in progress and will be examined weekly. A result will be issued either when positive or after 8 weeks incubation.    AFB Stain No acid fast bacilli seen    AFB Processed   Result Value Ref Range    Extra Tube Hold for add-ons.    Lactate Dehydrogenase, Body Fluid   Result Value Ref Range    LD, Fluid 105 Not established. U/L   Protein, Total, Body Fluid   Result Value Ref Range    Protein, Total Fluid 4.7 Not established g/dL   Body Fluid Cell Count   Result Value Ref Range    Color, Fluid Yellow Colorless, Straw, Yellow    Clarity, Fluid Hazy (A) Clear    WBC, Fluid 267 See Comment /uL    RBC, Fluid 3,000 see comment /uL   Body Fluid Differential   Result Value Ref Range    Neutrophils %, Manual, Fluid 5 see comment %    Lymphocytes %, Manual, Fluid 26 see comment %    Mono/Macrophages %, Manual, Fluid 69 see comment %    Eosinophils %, Manual, Fluid 0 see comment %    Basophils %, Manual, Fluid 0 not established %    Immature Granulocytes %, Manual, Fluid 0 not established %    Blasts %, Manual, Fluid 0 not established %    Unclassified Cells %, Manual, Fluid 0 not established %    Plasma Cells %, Manual, Fluid 0 not established %    Total Cells Counted, Fluid 100    POCT GLUCOSE   Result Value Ref Range    POCT Glucose 113 (H) 74 - 99 mg/dL   POCT GLUCOSE   Result Value Ref Range    POCT Glucose 162 (H) 74 - 99 mg/dL   POCT GLUCOSE   Result Value Ref Range    POCT Glucose 161 (H) 74 - 99 mg/dL   Basic metabolic panel   Result Value Ref Range     Glucose 114 (H) 74 - 99 mg/dL    Sodium 135 (L) 136 - 145 mmol/L    Potassium 3.4 (L) 3.5 - 5.3 mmol/L    Chloride 91 (L) 98 - 107 mmol/L    Bicarbonate 36 (H) 21 - 32 mmol/L    Anion Gap 11 10 - 20 mmol/L    Urea Nitrogen 24 (H) 6 - 23 mg/dL    Creatinine 0.96 0.50 - 1.30 mg/dL    eGFR 75 >60 mL/min/1.73m*2    Calcium 8.8 8.6 - 10.3 mg/dL   CBC   Result Value Ref Range    WBC 10.4 4.4 - 11.3 x10*3/uL    nRBC 0.0 0.0 - 0.0 /100 WBCs    RBC 3.76 (L) 4.50 - 5.90 x10*6/uL    Hemoglobin 11.1 (L) 13.5 - 17.5 g/dL    Hematocrit 33.2 (L) 41.0 - 52.0 %    MCV 88 80 - 100 fL    MCH 29.5 26.0 - 34.0 pg    MCHC 33.4 32.0 - 36.0 g/dL    RDW 15.4 (H) 11.5 - 14.5 %    Platelets 156 150 - 450 x10*3/uL   Magnesium   Result Value Ref Range    Magnesium 1.84 1.60 - 2.40 mg/dL   Protime-INR   Result Value Ref Range    Protime 26.0 (H) 9.8 - 12.4 seconds    INR 2.3 (H) 0.9 - 1.1   POCT GLUCOSE   Result Value Ref Range    POCT Glucose 112 (H) 74 - 99 mg/dL   POCT GLUCOSE   Result Value Ref Range    POCT Glucose 197 (H) 74 - 99 mg/dL   POCT GLUCOSE   Result Value Ref Range    POCT Glucose 142 (H) 74 - 99 mg/dL   POCT GLUCOSE   Result Value Ref Range    POCT Glucose 134 (H) 74 - 99 mg/dL   Basic metabolic panel   Result Value Ref Range    Glucose 117 (H) 74 - 99 mg/dL    Sodium 135 (L) 136 - 145 mmol/L    Potassium 3.7 3.5 - 5.3 mmol/L    Chloride 90 (L) 98 - 107 mmol/L    Bicarbonate 36 (H) 21 - 32 mmol/L    Anion Gap 13 10 - 20 mmol/L    Urea Nitrogen 27 (H) 6 - 23 mg/dL    Creatinine 0.96 0.50 - 1.30 mg/dL    eGFR 75 >60 mL/min/1.73m*2    Calcium 8.7 8.6 - 10.3 mg/dL   CBC   Result Value Ref Range    WBC 9.2 4.4 - 11.3 x10*3/uL    nRBC 0.0 0.0 - 0.0 /100 WBCs    RBC 3.80 (L) 4.50 - 5.90 x10*6/uL    Hemoglobin 11.0 (L) 13.5 - 17.5 g/dL    Hematocrit 33.6 (L) 41.0 - 52.0 %    MCV 88 80 - 100 fL    MCH 28.9 26.0 - 34.0 pg    MCHC 32.7 32.0 - 36.0 g/dL    RDW 15.3 (H) 11.5 - 14.5 %    Platelets 160 150 - 450 x10*3/uL   Magnesium   Result  Value Ref Range    Magnesium 1.85 1.60 - 2.40 mg/dL   Protime-INR   Result Value Ref Range    Protime 25.9 (H) 9.8 - 12.4 seconds    INR 2.3 (H) 0.9 - 1.1   POCT GLUCOSE   Result Value Ref Range    POCT Glucose 137 (H) 74 - 99 mg/dL   POCT GLUCOSE   Result Value Ref Range    POCT Glucose 170 (H) 74 - 99 mg/dL     *Note: Due to a large number of results and/or encounters for the requested time period, some results have not been displayed. A complete set of results can be found in Results Review.    US thoracentesis  Result Date: 4/18/2025  Interpreted By:  Alex Tabares, STUDY: US THORACENTESIS4/18/20253:15 pm   INDICATION: Signs/Symptoms:right sided pleural effusion   COMPARISON: CXR 4/17/25   ACCESSION NUMBER(S): [id number]   ORDERING CLINICIAN: ROSEMARIE CHAVEZ   TECHNIQUE: INTERVENTIONALIST(S): Alex Tabares   CONSENT: The patient/patient's POA/next of kin was informed of the nature of the proposed procedure. The purposes, alternatives, risks, and benefits were explained and discussed. All questions were answered and consent was obtained.   SEDATION: None   MEDICATION/CONTRAST: 1% lidocaine was used to anesthetize subcutaneously.   TIME OUT: A time out was performed immediately prior to procedure start with the interventional team, correctly identifying the patient name, date of birth, MRN, procedure, anatomy (including marking of site and side), patient position, procedure consent form, relevant laboratory and imaging test results, antibiotic administration, safety precautions, and procedure-specific equipment needs.   FINDINGS: The patient was placed in the sitting position.   The pleural space was examined with grey scale ultrasound, and the most accessible fluid identified and marked for thoracentesis.   The skin was prepped and draped in usual manner. Local anesthesia with lidocaine was administered and a right-sided thoracentesis was performed.  A 5 Nepali One-Step Valved thoracentesis needle/catheter was  then placed where marked. Approximately 1,300 mL of yellowish colored fluid was removed.  The needle/catheter was then withdrawn.   The patient tolerated the procedure well and there were no immediate complications. Specimen(s) sent to the laboratory for further evaluation, per the requesting team.       Uneventful thoracentesis, as detailed above. Right pleural space, 1,300 mL   I personally performed and/or directly supervised this study and was present for the entire procedure.   Performed and dictated at [facility].   Signed by: Alex Tabares 4/18/2025 3:15 PM Dictation workstation:   [id number]    Transthoracic Echo (TTE) Complete  Result Date: 4/18/2025   [facility], [address] [place] [address]              Tel [phone] and Fax [phone] TRANSTHORACIC ECHOCARDIOGRAM REPORT  Patient Name:       PHOENIX BROWN SUSAN   Reading Physician:    [id number] Arriaga DO Study Date:         4/18/2025           Ordering Provider:    [id number] LITA ARRIAGA MRN/PID:            [id number]            Fellow: Accession#:         [id number]        Nurse: Date of Birth/Age:  11/22/1934 / 89     Sonographer:          Maria Fernanda Stockton RDCS                     years Gender assigned at  M                   Additional Staff: Birth: Height:             160.02 cm           Admit Date:           4/17/2025 Weight:             77.11 kg            Admission Status:     Inpatient -                                                               Routine BSA / BMI:          1.80 m2 / 30.11     Encounter#:           [id number]                     kg/m2 Blood Pressure:     131/78 mmHg         Department Location:  [facility] Non                                                               Invasive Study Type:    TRANSTHORACIC ECHO (TTE) COMPLETE Diagnosis/ICD: Heart  failure, unspecified-I50.9; Abnormal electrocardiogram                [ECG] [EKG]-R94.31 Indication:    CHF, abn EKG, afib CPT Code:      Echo Complete w Full Doppler-[id number] Patient History: Pertinent History: HTN and A-Fib. Atrial flutter, bradycardia, unstable angina,                    NSTEMI. Study Detail: The following Echo studies were performed: 2D, M-Mode, Doppler and               color flow. Unable to obtain subcostal and poor subcostal window               view.  PHYSICIAN INTERPRETATION: Left Ventricle: Left ventricular ejection fraction is normal, calculated by Rodriguez's biplane at 60%. There are no regional left ventricular wall motion abnormalities. The left ventricular cavity size is normal. There is mildly increased septal and normal posterior left ventricular wall thickness. There is left ventricular concentric remodeling. Left ventricular diastolic filling cannot be determined due to atrial fibrillation/flutter. Left Atrium: The left atrial size is severely dilated. Right Ventricle: The right ventricle is normal in size. There is normal right ventricular global systolic function. Right Atrium: The right atrium is severely dilated. Aortic Valve: The aortic valve is trileaflet. The aortic valve dimensionless index is 0.48. There is no evidence of aortic valve regurgitation. The peak instantaneous gradient of the aortic valve is 21 mmHg. The mean gradient of the aortic valve is 12 mmHg. Mitral Valve: The mitral valve is normal in structure. The mitral valve spectral Doppler A-wave is absent, consistent with atrial fibrillation. There is trace mitral valve regurgitation. Tricuspid Valve: The tricuspid valve is structurally normal. There is trace tricuspid regurgitation. The Doppler estimated RVSP is mildly elevated at 46.4 mmHg. Pulmonic Valve: The pulmonic valve is structurally normal. There is no indication of pulmonic valve regurgitation. Pericardium: Small to moderate pericardial effusion. Aorta:  The aortic root is normal. Systemic Veins: The inferior vena cava was not well visualized. In comparison to the previous echocardiogram(s): Compared with study dated 6/28/2023, pericardial effusion has increased.  CONCLUSIONS:  1. Left ventricular ejection fraction is normal, calculated by Rodriguez's biplane at 60%.  2. The left atrial size is severely dilated.  3. The right atrium is severely dilated.  4. Small to moderate pericardial effusion.  5. Absent A-wave on MV spectral Doppler tracing, consistent with atrial fibrillation.  6. Mildly elevated right ventricular systolic pressure.  7. Compared with study dated 6/28/2023, pericardial effusion has increased. QUANTITATIVE DATA SUMMARY:  2D MEASUREMENTS:          Normal Ranges: LAs:             5.77 cm  (2.7-4.0cm) IVSd:            1.22 cm  (0.6-1.1cm) LVPWd:           0.98 cm  (0.6-1.1cm) LVIDd:           4.29 cm  (3.9-5.9cm) LVIDs:           2.89 cm LV Mass Index:   90 g/m2 LVEDV Index:     43 ml/m2 LV % FS          32.7 %  LEFT ATRIUM:                  Normal Ranges: LA Vol A4C:        141.3 ml   (22+/-6mL/m2) LA Vol A2C:        156.9 ml LA Vol BP:         159.1 ml LA Vol Index A4C:  78.3ml/m2 LA Vol Index A2C:  86.9 ml/m2 LA Vol Index BP:   88.1 ml/m2 LA Area A4C:       36.7 cm2 LA Area A2C:       36.2 cm2 LA Major Axis A4C: 8.1 cm LA Major Axis A2C: 7.1 cm LA Volume Index:   88.1 ml/m2 LA Vol A4C:        134.4 ml LA Vol A2C:        147.7 ml LA Vol Index BSA:  78.2 ml/m2  RIGHT ATRIUM:                 Normal Ranges: RA Vol A4C:        113.8 ml   (8.3-19.5ml) RA Vol Index A4C:  63.1 ml/m2 RA Area A4C:       30.4 cm2 RA Major Axis A4C: 6.9 cm  M-MODE MEASUREMENTS:         Normal Ranges: LAs:                 6.23 cm (2.7-4.0cm)  AORTA MEASUREMENTS:         Normal Ranges: Ao Sinus, d:        3.10 cm (2.1-3.5cm) Ao STJ, d:          2.50 cm (1.7-3.4cm) Asc Ao, d:          3.20 cm (2.1-3.4cm)  LV SYSTOLIC FUNCTION:                      Normal Ranges: EF-A4C View:     62 % (>=55%) EF-A2C View:    57 % EF-Biplane:     60 % LV EF Reported: 60 %  AORTIC VALVE:                      Normal Ranges: AoV Vmax:                2.27 m/s  (<=1.7m/s) AoV Peak P.6 mmHg (<20mmHg) AoV Mean P.1 mmHg (1.7-11.5mmHg) LVOT Max Rex:            0.94 m/s  (<=1.1m/s) AoV VTI:                 39.15 cm  (18-25cm) LVOT VTI:                18.61 cm LVOT Diameter:           2.29 cm   (1.8-2.4cm) AoV Area, VTI:           1.96 cm2  (2.5-5.5cm2) AoV Area,Vmax:           1.71 cm2  (2.5-4.5cm2) AoV Dimensionless Index: 0.48  RIGHT VENTRICLE: RV Basal 4.50 cm RV Mid   3.80 cm RV Major 6.1 cm TAPSE:   18.0 mm RV s'    0.16 m/s  TRICUSPID VALVE/RVSP:          Normal Ranges: Peak TR Velocity:     3.30 m/s Est. RA Pressure:     3 mmHg RV Syst Pressure:     46 mmHg  (< 30mmHg)  PULMONIC VALVE:          Normal Ranges: PV Accel Time:  63 msec  (>120ms) PV Max Rex:     1.2 m/s  (0.6-0.9m/s) PV Max P.1 mmHg  AORTA: Asc Ao Diam 3.22 cm  [id number] Trino June DO Electronically signed on 2025 at 12:33:15 PM  ** Final **     XR chest 1 view  Result Date: 2025  Interpreted By:  Rainer Wyatt, STUDY: XR CHEST 1 VIEW;  2025 11:19 am   INDICATION: Signs/Symptoms:S/P Thora.   COMPARISON: Chest x-ray from 2025. CT scan chest from 2024.   ACCESSION NUMBER(S): [id number]   ORDERING CLINICIAN: DIEGO JONES   TECHNIQUE: Single AP portable view of the chest was obtained.   FINDINGS: MEDIASTINUM/ LUNGS/ MAGNO: Suboptimal level of inspiration. There is still a small to moderate-sized right pleural effusion, improved. There is persistent mild atelectasis at the base of the aerated right lung. No left pleural effusion. Scant atelectasis at the retrocardiac left lung base. Cardiomegaly without vascular congestion. No pneumothorax. No tracheal deviation. No abnormal hilar fullness or gross mass on either side.   BONES: No lytic or blastic destructive bone lesion.   UPPER  ABDOMEN: Grossly intact.       As above. Status post right thoracentesis. No pneumothorax.   MACRO: None   Signed by: Rainer Wyatt 4/18/2025 11:24 AM Dictation workstation:   [id number]    ECG 12 lead  Result Date: 4/17/2025  Atrial fibrillation Low voltage QRS Abnormal ECG When compared with ECG of 27-NOV-2024 11:12, No significant change was found See ED provider note for full interpretation and clinical correlation Confirmed by Lisa Fisher ([id number]) on 4/17/2025 2:02:32 PM    XR chest 1 view  Result Date: 4/17/2025  Interpreted By:  Denis Morales, STUDY: XR CHEST 1 VIEW  4/17/2025 12:50 pm   INDICATION: Signs/Symptoms:sob   COMPARISON: 11/13/2024   ACCESSION NUMBER(S): [id number]   ORDERING CLINICIAN: MARINA CASTANO   TECHNIQUE: A single AP portable radiograph of the chest was obtained.   FINDINGS: Multiple cardiac monitoring leads are seen over the chest.  A moderate-sized right-sided pleural effusion is present. Right basilar airspace consolidation is seen and may represent atelectasis and/or pneumonia. No pneumothorax is identified. The cardiac silhouette is within normal limits for size.       Right-sided pleural effusion and airspace consolidation, as above. Clinical correlation and continued follow-up until clearing is recommended.   MACRO: None.   Signed by: Denis Morales 4/17/2025 1:33 PM Dictation workstation:   [id number]            Assessment & Plan  Biventricular congestive heart failure  89-year-old gentleman with known history of severe coronary artery disease status post PCI of LAD in 2014 and NSTEMI in 2022, history of chronic atrial fibrillation, hypertension, type 2 diabetes mellitus presents with increasing shortness of breath and leg edema, orthopnea and moderate-size increased right pleural effusion.  Patient has been taking his Lasix and metolazone weekly as prescribed as an outpatient.  His chest x-ray was consistent with right pleural effusion significantly larger than before  and EKG shows atrial fibrillation with slow ventricular response and his BN peptide is elevated  Biventricular congestive heart failure  89-year-old gentleman with known history of severe coronary artery disease status post PCI of LAD in 2014 and NSTEMI in 2022, history of chronic atrial fibrillation, hypertension, type 2 diabetes mellitus presents with increasing shortness of breath and leg edema, orthopnea and moderate-size increased right pleural effusion.  Patient has been taking his Lasix and metolazone weekly as prescribed as an outpatient.  His chest x-ray was consistent with right pleural effusion significantly larger than before and EKG shows atrial fibrillation with slow ventricular response and his BN peptide is elevated     1.  Acute on chronic diastolic heart failure with increasing right edema and moderate-sized right pleural effusion and acute hypoxic respiratory failure  Chest x-ray consistent with large right pleural effusion, had thoracocentesis on April 18, 2025 and 1300 cc of pleural fluid was removed, pleural fluid cytology analysis is pending.  IV diuretics Lasix 40 mg IV twice daily, cardiology input appreciated.  Echocardiogram reviewed consistent with diastolic dysfunction.  Significant improvement, leg edema resolved diuresed good, will reduce Lasix to 40 mg po bid from tomorrow.  Serum potassium 3.4, patient receiving 20 mg of potassium daily.     2.  Chronic atrial fibrillation with slow ventricular response and blood pressures were rather low on admission therefore his diltiazem and metoprolol is being held, however today heart rate is around 100 to110 bpm and blood pressures have improved therefore will resume metoprolol.  As well as seen by cardiology and patient will receive IV digoxin.  Patient has been on Coumadin and INR is 2.3, on Coumadin 5 mg daily     3.  Diabetes mellitus type 2 stable, continue with current regime of metformin and sliding scale insulin if necessary, most  recent A1c was 6.4.     4.Previous history of NSTEMI and LAD PCI.     Reviewed all labs and imaging studies discussed the plan of treatment with patient and his wife was present in the room, and she is updated with patient's condition     I spent 35 minutes in the professional and overall care of this patient.             I spent  minutes in the professional and overall care of this patient.      Chon Lanza MD

## 2025-04-20 NOTE — NURSING NOTE
Elevated hr the beginning of the shift contacted on call cardiologist new orders metoprolol IV prn

## 2025-04-20 NOTE — PROGRESS NOTES
Ariel Rodríguez is a 89 y.o. male admitted for shortness of breath. Pharmacy has been consulted for warfarin dosing and monitoring for Atrial Fibrillation/Atrial Flutter with goal INR of 2.0-3.0.     Home regimen   MON TUE WED THR FRI SAT SUN   Dose 5  mg 5  mg 5  mg 5  mg 5  mg 5  mg 5  mg   Total weekly dose: 35 mg  Source: [facility]    Labs  INR: 2.3  Hgb/Hct/Plt  Lab Results   Component Value Date    HGB 11.0 (L) 04/20/2025    HGB 11.1 (L) 04/19/2025    HGB 10.3 (L) 04/18/2025    HGB 10.4 (L) 04/17/2025    HGB 10.7 (L) 04/02/2025        Lab Results   Component Value Date    HCT 33.6 (L) 04/20/2025    HCT 33.2 (L) 04/19/2025    HCT 31.8 (L) 04/18/2025    HCT 31.4 (L) 04/17/2025    HCT 34.2 (L) 04/02/2025        Platelets   Date Value Ref Range Status   04/20/2025 160 150 - 450 x10*3/uL Final   04/19/2025 156 150 - 450 x10*3/uL Final   04/18/2025 164 150 - 450 x10*3/uL Final   04/17/2025 157 150 - 450 x10*3/uL Final     PLATELET COUNT   Date Value Ref Range Status   04/02/2025 163 140 - 400 Thousand/uL Final      Warfarin Therapy   Current regimen: resuming home reigmen  Bridging: None needed  Interacting medications: no interacting medications    Dosing History During Current Admission  Date 4/17 4/18 4/19 4/20   INR 3.0 2.6 2.3 2.3   Dose  (mg) HOLD 3 mg 5 mg 5 mg     Assessment and Plan  Patient's INR of 2.3 today is Therapeutic. Hemoglobin/hematocrit/platelet stable  Warfarin inpatient plan: Give 5 mg today per home regimen.  Monitor s/sx of bleeding including epistaxis, hematuria, unusual bruising, hemoptysis, hematochezia as well as s/sx of stroke including impaired speech, unilateral paralysis, blurry vision.  Pharmacy will continue to monitor the patient and adjust therapy as needed.    Thank you for the consult. Please do not hesitate to contact a pharmacist with any questions.    Gracie Ornelas, PharmD

## 2025-04-21 ENCOUNTER — APPOINTMENT (OUTPATIENT)
Dept: RADIOLOGY | Facility: HOSPITAL | Age: OVER 89
DRG: 291 | End: 2025-04-21
Payer: MEDICARE

## 2025-04-21 LAB
ANION GAP SERPL CALC-SCNC: 9 MMOL/L (ref 10–20)
BACTERIA FLD CULT: NORMAL
BUN SERPL-MCNC: 33 MG/DL (ref 6–23)
CALCIUM SERPL-MCNC: 9.2 MG/DL (ref 8.6–10.3)
CHLORIDE SERPL-SCNC: 88 MMOL/L (ref 98–107)
CO2 SERPL-SCNC: 40 MMOL/L (ref 21–32)
CREAT SERPL-MCNC: 1 MG/DL (ref 0.5–1.3)
EGFRCR SERPLBLD CKD-EPI 2021: 71 ML/MIN/1.73M*2
ERYTHROCYTE [DISTWIDTH] IN BLOOD BY AUTOMATED COUNT: 14.7 % (ref 11.5–14.5)
GLUCOSE BLD MANUAL STRIP-MCNC: 157 MG/DL (ref 74–99)
GLUCOSE BLD MANUAL STRIP-MCNC: 157 MG/DL (ref 74–99)
GLUCOSE BLD MANUAL STRIP-MCNC: 160 MG/DL (ref 74–99)
GLUCOSE BLD MANUAL STRIP-MCNC: 176 MG/DL (ref 74–99)
GLUCOSE SERPL-MCNC: 125 MG/DL (ref 74–99)
GRAM STN SPEC: NORMAL
GRAM STN SPEC: NORMAL
HCT VFR BLD AUTO: 35.5 % (ref 41–52)
HGB BLD-MCNC: 11.8 G/DL (ref 13.5–17.5)
INR PPP: 2.4 (ref 0.9–1.1)
MAGNESIUM SERPL-MCNC: 1.97 MG/DL (ref 1.6–2.4)
MCH RBC QN AUTO: 29.5 PG (ref 26–34)
MCHC RBC AUTO-ENTMCNC: 33.2 G/DL (ref 32–36)
MCV RBC AUTO: 89 FL (ref 80–100)
NRBC BLD-RTO: 0 /100 WBCS (ref 0–0)
PLATELET # BLD AUTO: 186 X10*3/UL (ref 150–450)
POTASSIUM SERPL-SCNC: 3.4 MMOL/L (ref 3.5–5.3)
PROTHROMBIN TIME: 26.8 SECONDS (ref 9.8–12.4)
RBC # BLD AUTO: 4 X10*6/UL (ref 4.5–5.9)
SODIUM SERPL-SCNC: 134 MMOL/L (ref 136–145)
WBC # BLD AUTO: 8.1 X10*3/UL (ref 4.4–11.3)

## 2025-04-21 PROCEDURE — 97530 THERAPEUTIC ACTIVITIES: CPT | Mod: GP,CQ

## 2025-04-21 PROCEDURE — 36415 COLL VENOUS BLD VENIPUNCTURE: CPT | Performed by: NURSE PRACTITIONER

## 2025-04-21 PROCEDURE — 85027 COMPLETE CBC AUTOMATED: CPT | Performed by: NURSE PRACTITIONER

## 2025-04-21 PROCEDURE — 99231 SBSQ HOSP IP/OBS SF/LOW 25: CPT | Performed by: NURSE PRACTITIONER

## 2025-04-21 PROCEDURE — 82947 ASSAY GLUCOSE BLOOD QUANT: CPT

## 2025-04-21 PROCEDURE — 71250 CT THORAX DX C-: CPT | Performed by: RADIOLOGY

## 2025-04-21 PROCEDURE — 2500000001 HC RX 250 WO HCPCS SELF ADMINISTERED DRUGS (ALT 637 FOR MEDICARE OP): Performed by: INTERNAL MEDICINE

## 2025-04-21 PROCEDURE — 71046 X-RAY EXAM CHEST 2 VIEWS: CPT

## 2025-04-21 PROCEDURE — 2500000001 HC RX 250 WO HCPCS SELF ADMINISTERED DRUGS (ALT 637 FOR MEDICARE OP): Performed by: NURSE PRACTITIONER

## 2025-04-21 PROCEDURE — 2500000002 HC RX 250 W HCPCS SELF ADMINISTERED DRUGS (ALT 637 FOR MEDICARE OP, ALT 636 FOR OP/ED): Performed by: NURSE PRACTITIONER

## 2025-04-21 PROCEDURE — 97116 GAIT TRAINING THERAPY: CPT | Mod: GP,CQ

## 2025-04-21 PROCEDURE — 71046 X-RAY EXAM CHEST 2 VIEWS: CPT | Performed by: RADIOLOGY

## 2025-04-21 PROCEDURE — 83735 ASSAY OF MAGNESIUM: CPT | Performed by: NURSE PRACTITIONER

## 2025-04-21 PROCEDURE — 99232 SBSQ HOSP IP/OBS MODERATE 35: CPT | Performed by: INTERNAL MEDICINE

## 2025-04-21 PROCEDURE — 82374 ASSAY BLOOD CARBON DIOXIDE: CPT | Performed by: NURSE PRACTITIONER

## 2025-04-21 PROCEDURE — 2060000001 HC INTERMEDIATE ICU ROOM DAILY

## 2025-04-21 PROCEDURE — 85610 PROTHROMBIN TIME: CPT

## 2025-04-21 PROCEDURE — 2500000004 HC RX 250 GENERAL PHARMACY W/ HCPCS (ALT 636 FOR OP/ED): Mod: JZ | Performed by: INTERNAL MEDICINE

## 2025-04-21 PROCEDURE — 2500000002 HC RX 250 W HCPCS SELF ADMINISTERED DRUGS (ALT 637 FOR MEDICARE OP, ALT 636 FOR OP/ED)

## 2025-04-21 PROCEDURE — 99233 SBSQ HOSP IP/OBS HIGH 50: CPT | Performed by: INTERNAL MEDICINE

## 2025-04-21 PROCEDURE — 2500000004 HC RX 250 GENERAL PHARMACY W/ HCPCS (ALT 636 FOR OP/ED)

## 2025-04-21 PROCEDURE — 71250 CT THORAX DX C-: CPT

## 2025-04-21 RX ORDER — IPRATROPIUM BROMIDE AND ALBUTEROL SULFATE 2.5; .5 MG/3ML; MG/3ML
3 SOLUTION RESPIRATORY (INHALATION) EVERY 2 HOUR PRN
Status: DISCONTINUED | OUTPATIENT
Start: 2025-04-21 | End: 2025-04-23 | Stop reason: HOSPADM

## 2025-04-21 RX ORDER — DILTIAZEM HYDROCHLORIDE 120 MG/1
240 CAPSULE, COATED, EXTENDED RELEASE ORAL DAILY
Status: DISCONTINUED | OUTPATIENT
Start: 2025-04-21 | End: 2025-04-23 | Stop reason: HOSPADM

## 2025-04-21 RX ORDER — POTASSIUM CHLORIDE 20 MEQ/1
20 TABLET, EXTENDED RELEASE ORAL ONCE
Status: COMPLETED | OUTPATIENT
Start: 2025-04-21 | End: 2025-04-21

## 2025-04-21 RX ADMIN — FUROSEMIDE 40 MG: 10 INJECTION, SOLUTION INTRAMUSCULAR; INTRAVENOUS at 08:21

## 2025-04-21 RX ADMIN — WARFARIN SODIUM 5 MG: 5 TABLET ORAL at 17:17

## 2025-04-21 RX ADMIN — INSULIN LISPRO 1 UNITS: 100 INJECTION, SOLUTION INTRAVENOUS; SUBCUTANEOUS at 12:47

## 2025-04-21 RX ADMIN — METOPROLOL TARTRATE 50 MG: 50 TABLET, FILM COATED ORAL at 20:53

## 2025-04-21 RX ADMIN — INSULIN LISPRO 1 UNITS: 100 INJECTION, SOLUTION INTRAVENOUS; SUBCUTANEOUS at 08:33

## 2025-04-21 RX ADMIN — PANTOPRAZOLE SODIUM 40 MG: 40 TABLET, DELAYED RELEASE ORAL at 06:13

## 2025-04-21 RX ADMIN — METOPROLOL TARTRATE 50 MG: 50 TABLET, FILM COATED ORAL at 08:21

## 2025-04-21 RX ADMIN — POTASSIUM CHLORIDE 20 MEQ: 1500 TABLET, EXTENDED RELEASE ORAL at 08:21

## 2025-04-21 RX ADMIN — DILTIAZEM HYDROCHLORIDE 240 MG: 120 CAPSULE, COATED, EXTENDED RELEASE ORAL at 09:28

## 2025-04-21 RX ADMIN — POTASSIUM CHLORIDE 20 MEQ: 1500 TABLET, EXTENDED RELEASE ORAL at 09:28

## 2025-04-21 RX ADMIN — METFORMIN ER 500 MG 500 MG: 500 TABLET ORAL at 08:21

## 2025-04-21 RX ADMIN — INSULIN LISPRO 1 UNITS: 100 INJECTION, SOLUTION INTRAVENOUS; SUBCUTANEOUS at 17:17

## 2025-04-21 RX ADMIN — Medication 50 MCG: at 06:12

## 2025-04-21 RX ADMIN — ATORVASTATIN CALCIUM 80 MG: 80 TABLET, FILM COATED ORAL at 08:21

## 2025-04-21 RX ADMIN — ASPIRIN 81 MG: 81 TABLET, COATED ORAL at 08:21

## 2025-04-21 RX ADMIN — DIGOXIN 250 MCG: 250 INJECTION, SOLUTION INTRAMUSCULAR; INTRAVENOUS; PARENTERAL at 00:51

## 2025-04-21 ASSESSMENT — COGNITIVE AND FUNCTIONAL STATUS - GENERAL
MOVING FROM LYING ON BACK TO SITTING ON SIDE OF FLAT BED WITH BEDRAILS: A LITTLE
STANDING UP FROM CHAIR USING ARMS: A LITTLE
EATING MEALS: A LITTLE
DRESSING REGULAR LOWER BODY CLOTHING: A LITTLE
MOVING TO AND FROM BED TO CHAIR: A LITTLE
DRESSING REGULAR UPPER BODY CLOTHING: A LITTLE
WALKING IN HOSPITAL ROOM: A LITTLE
MOVING FROM LYING ON BACK TO SITTING ON SIDE OF FLAT BED WITH BEDRAILS: A LITTLE
DAILY ACTIVITIY SCORE: 18
MOBILITY SCORE: 18
CLIMB 3 TO 5 STEPS WITH RAILING: TOTAL
WALKING IN HOSPITAL ROOM: A LITTLE
MOVING TO AND FROM BED TO CHAIR: A LITTLE
TURNING FROM BACK TO SIDE WHILE IN FLAT BAD: A LITTLE
MOBILITY SCORE: 16
CLIMB 3 TO 5 STEPS WITH RAILING: A LITTLE
STANDING UP FROM CHAIR USING ARMS: A LITTLE
TURNING FROM BACK TO SIDE WHILE IN FLAT BAD: A LITTLE
PERSONAL GROOMING: A LITTLE
HELP NEEDED FOR BATHING: A LITTLE
TOILETING: A LITTLE

## 2025-04-21 ASSESSMENT — PAIN - FUNCTIONAL ASSESSMENT
PAIN_FUNCTIONAL_ASSESSMENT: 0-10
PAIN_FUNCTIONAL_ASSESSMENT: 0-10

## 2025-04-21 ASSESSMENT — PAIN SCALES - GENERAL
PAINLEVEL_OUTOF10: 0 - NO PAIN

## 2025-04-21 NOTE — PROGRESS NOTES
Subjective Data:  Tachycardic over the weekend in the setting of holding diltiazem and IV diuresis.  Was given IV digoxin yesterday.  Says he feels good and back to normal.  Visibly dyspneic  On 2.5 L nasal cannula.  I turned down to 1.5 this morning    Overnight Events:    None reported     Objective Data:  Last Recorded Vitals:  Vitals:    25 2101 25 0000 25 0400 25 0756   BP: 131/80 118/69 114/75 120/70   BP Location:  Right arm Left arm Right arm   Patient Position:  Lying Lying Lying   Pulse: (!) 117      Resp:  18 18    Temp:  36.7 °C (98 °F) 37.2 °C (99 °F) 36.4 °C (97.5 °F)   TempSrc:  Temporal Temporal Axillary   SpO2:  98% 93% 96%   Weight:       Height:         Medical Gas Therapy: Supplemental oxygen  Medical Gas Delivery Method: Nasal cannula  Weight  Av.1 kg (170 lb)  Min: 77.1 kg (170 lb)  Max: 77.1 kg (170 lb)    LABS:  CMP:  Results from last 7 days   Lab Units 25  0550 25  0548 25  0611 25  1343 25  1248   SODIUM mmol/L 135* 135* 136  --  132*   POTASSIUM mmol/L 3.7 3.4* 3.6  --  4.3   CHLORIDE mmol/L 90* 91* 94*  --  99   CO2 mmol/L 36* 36* 35*  --  24   ANION GAP mmol/L 13 11 11  --  13   BUN mg/dL 27* 24* 21  --  26*   CREATININE mg/dL 0.96 0.96 0.96  --  1.10   EGFR mL/min/1.73m*2 75 75 75  --  64   MAGNESIUM mg/dL 1.85 1.84 2.05 1.87 1.86   ALBUMIN g/dL  --   --   --   --  3.6   ALT U/L  --   --   --   --  14   AST U/L  --   --   --   --  22   BILIRUBIN TOTAL mg/dL  --   --   --   --  0.7     CBC:  Results from last 7 days   Lab Units 25  0750 25  0550 25  0548 25  0611 25  1248   WBC AUTO x10*3/uL 8.1 9.2 10.4 6.5 6.5   HEMOGLOBIN g/dL 11.8* 11.0* 11.1* 10.3* 10.4*   HEMATOCRIT % 35.5* 33.6* 33.2* 31.8* 31.4*   PLATELETS AUTO x10*3/uL 186 160 156 164 157   MCV fL 89 88 88 90 89     COAG:   Results from last 7 days   Lab Units 25  0750 25  0550 25  0548 25  0611 25  1248  "  INR  2.4* 2.3* 2.3* 2.6* 3.0*     ABO: No results found for: \"ABO\"  HEME/ENDO:     CARDIAC:   Results from last 7 days   Lab Units 04/18/25  0611 04/17/25  1343 04/17/25  1248   LD U/L 153  --   --    TROPHS ng/L  --  5 5   BNP pg/mL  --   --  466*             Last I/O:    Intake/Output Summary (Last 24 hours) at 4/21/2025 0839  Last data filed at 4/20/2025 1842  Gross per 24 hour   Intake --   Output 400 ml   Net -400 ml     Net IO Since Admission: -4,790 mL [04/21/25 0839]         Inpatient Medications:  Scheduled Medications[1]  PRN Medications[2]  Continuous Medications[3]      Physical Exam:  GENERAL: pleasant male, alert and oriented, NAD  SKIN: warm, dry  NECK: mild JVD  CARDIAC: Irregularly irregular + murmur  PULMONARY: + crackles in the bases; supp oxygen in place  ABDOMEN: soft, nondistended  EXTREMITIES: No noted lower extremity edema  NEURO: Alert and oriented x 3.  Grossly normal.  Moves all 4 extremities.     Echocardiogram 4/18/2025:  CONCLUSIONS:   1. Left ventricular ejection fraction is normal, calculated by Rodriguez's biplane at 60%.   2. The left atrial size is severely dilated.   3. The right atrium is severely dilated.   4. Small to moderate pericardial effusion.   5. Absent A-wave on MV spectral Doppler tracing, consistent with atrial fibrillation.   6. Mildly elevated right ventricular systolic pressure.   7. Compared with study dated 6/28/2023, pericardial effusion has increased.     Assessment/Plan  Ariel Rodríguez is a 89 y.o. male with past medical history significant for Severe coronary artery disease s/p prior PCI to proximal and distal LAD and  NSTEMI 8/2014 (medical management), HFpEF, permanent atrial fibrillation on Coumadin, hypertension, Hyperlipidemia, Diabetes mellitus, Right pleural effusion.  Presented with shortness of breath.  Cardiology is consulted for CHF.     Home CV meds: Aspirin 81 mg daily Atorvastatin 80 mg daily Diltiazem  mg daily Metoprolol tartrate 25 " mg twice daily Coumadin 5 mg daily Doxazosin 6 mg daily Ramipril 5 mg daily Furosemide 40 mg daily Metolazone 2.5 mg weekly     I reviewed telemetry, AF rates 130-140's     #Acute hypoxic respiratory failure- Currently on 1.5 L NC (on no oxygen at home)   #Chronic right pleural effusion s/p thoracentesis with 1300 cc fluid removal 4/18/2025.  Consistent with exudate by lights criteria  #Acute on chronic HFpEF-    # Severe CAD s/p prior PCI to proximal and distal LAD and  NSTEMI 8/2014 (medical management)  #Permanent atrial fibrillation on Coumadin- rate controlled   #Hypertension  #Hyperlipidemia     Recommendations  Restart diltiazem at 240.  Can uptitrate to 360 which is his home dose if tolerates  Continue metoprolol  DC IV furosemide.  He already received a dose today.   CT chest without contrast  Consider pulmonary consultation for exudative effusion    Code Status:  DNR and No Intubation        Trino June DO       [1]   Scheduled medications   Medication Dose Route Frequency    aspirin  81 mg oral Daily    atorvastatin  80 mg oral Daily    cholecalciferol  50 mcg oral Daily    dilTIAZem CD  240 mg oral Daily    [Held by provider] doxazosin  6 mg oral Daily    insulin lispro  0-5 Units subcutaneous TID AC    metFORMIN XR  500 mg oral Daily    metoprolol tartrate  50 mg oral BID    pantoprazole  40 mg oral Daily before breakfast    potassium chloride CR  20 mEq oral Daily    warfarin  5 mg oral Daily   [2]   PRN medications   Medication    acetaminophen    Or    acetaminophen    Or    acetaminophen    alum-mag hydroxide-simeth    dextromethorphan-guaifenesin    ipratropium-albuteroL    melatonin    metoprolol    ondansetron ODT    Or    ondansetron    oxygen    polyethylene glycol   [3]   Continuous Medications   Medication Dose Last Rate

## 2025-04-21 NOTE — CARE PLAN
The patient's goals for the shift include work with pt/ot    The clinical goals for the shift include Maintain pt safety    Over the shift, the patient did make progress toward the following goals.      Problem: Pain - Adult  Goal: Verbalizes/displays adequate comfort level or baseline comfort level  Outcome: Progressing  Flowsheets (Taken 4/21/2025 1116)  Verbalizes/displays adequate comfort level or baseline comfort level:   Encourage patient to monitor pain and request assistance   Administer analgesics based on type and severity of pain and evaluate response   Consider cultural and social influences on pain and pain management   Implement non-pharmacological measures as appropriate and evaluate response   Assess pain using appropriate pain scale     Problem: Safety - Adult  Goal: Free from fall injury  Outcome: Progressing  Flowsheets (Taken 4/21/2025 1116)  Free from fall injury:   Instruct family/caregiver on patient safety   Based on caregiver fall risk screen, instruct family/caregiver to ask for assistance with transferring infant if caregiver noted to have fall risk factors     Problem: Discharge Planning  Goal: Discharge to home or other facility with appropriate resources  Outcome: Progressing  Flowsheets (Taken 4/21/2025 1116)  Discharge to home or other facility with appropriate resources:   Identify barriers to discharge with patient and caregiver   Identify discharge learning needs (meds, wound care, etc)   Refer to discharge planning if patient needs post-hospital services based on physician order or complex needs related to functional status, cognitive ability or social support system   Arrange for needed discharge resources and transportation as appropriate   Arrange for interpreters to assist at discharge as needed     Problem: Chronic Conditions and Co-morbidities  Goal: Patient's chronic conditions and co-morbidity symptoms are monitored and maintained or improved  Outcome:  Progressing  Flowsheets (Taken 4/21/2025 1116)  Care Plan - Patient's Chronic Conditions and Co-Morbidity Symptoms are Monitored and Maintained or Improved:   Monitor and assess patient's chronic conditions and comorbid symptoms for stability, deterioration, or improvement   Collaborate with multidisciplinary team to address chronic and comorbid conditions and prevent exacerbation or deterioration   Update acute care plan with appropriate goals if chronic or comorbid symptoms are exacerbated and prevent overall improvement and discharge     Problem: Nutrition  Goal: Nutrient intake appropriate for maintaining nutritional needs  Outcome: Progressing     Problem: Fall/Injury  Goal: Not fall by end of shift  Outcome: Progressing  Goal: Be free from injury by end of the shift  Outcome: Progressing  Goal: Verbalize understanding of personal risk factors for fall in the hospital  Outcome: Progressing  Goal: Verbalize understanding of risk factor reduction measures to prevent injury from fall in the home  Outcome: Progressing  Goal: Use assistive devices by end of the shift  Outcome: Progressing  Goal: Pace activities to prevent fatigue by end of the shift  Outcome: Progressing     Problem: Skin  Goal: Decreased wound size/increased tissue granulation at next dressing change  Outcome: Progressing  Flowsheets (Taken 4/21/2025 1116)  Decreased wound size/increased tissue granulation at next dressing change:   Promote sleep for wound healing   Protective dressings over bony prominences  Goal: Participates in plan/prevention/treatment measures  Outcome: Progressing  Flowsheets (Taken 4/21/2025 1116)  Participates in plan/prevention/treatment measures:   Discuss with provider PT/OT consult   Elevate heels   Increase activity/out of bed for meals  Goal: Prevent/manage excess moisture  Outcome: Progressing  Flowsheets (Taken 4/21/2025 1116)  Prevent/manage excess moisture:   Cleanse incontinence/protect with barrier cream    Monitor for/manage infection if present   Follow provider orders for dressing changes   Moisturize dry skin  Goal: Prevent/minimize sheer/friction injuries  Outcome: Progressing  Flowsheets (Taken 4/21/2025 1116)  Prevent/minimize sheer/friction injuries:   Increase activity/out of bed for meals   HOB 30 degrees or less   Turn/reposition every 2 hours/use positioning/transfer devices   Complete micro-shifts as needed if patient unable. Adjust patient position to relieve pressure points, not a full turn   Use pull sheet  Goal: Promote/optimize nutrition  Outcome: Progressing  Flowsheets (Taken 4/21/2025 1116)  Promote/optimize nutrition:   Assist with feeding   Offer water/supplements/favorite foods   Monitor/record intake including meals   Reassess MST if dietician not consulted  Goal: Promote skin healing  Outcome: Progressing  Flowsheets (Taken 4/21/2025 1116)  Promote skin healing:   Assess skin/pad under line(s)/device(s)   Protective dressings over bony prominences   Ensure correct size (line/device) and apply per  instructions   Rotate device position/do not position patient on device   Turn/reposition every 2 hours/use positioning/transfer devices     Problem: Respiratory  Goal: Clear secretions with interventions this shift  Outcome: Progressing  Flowsheets (Taken 4/21/2025 1116)  Clear secretions with interventions this shift:   Encourage/provide pulmonary hygiene/secretion clearance   Med administration/monitoring of effect   Incentive spirometry  Goal: Minimize anxiety/maximize coping throughout shift  Outcome: Progressing  Flowsheets (Taken 4/21/2025 1116)  Minimize anxiety/maximize coping throughout shift:   Med administration/monitoring of effect   Monitor pain/anxiety level  Goal: Minimal/no exertional discomfort or dyspnea this shift  Outcome: Progressing  Flowsheets (Taken 4/21/2025 1116)  Minimal/no exertional discomfort or dyspnea this shift: Positioning to promote  ventilation/comfort  Goal: No signs of respiratory distress (eg. Use of accessory muscles. Peds grunting)  Outcome: Progressing  Flowsheets (Taken 4/21/2025 1116)  No signs of respiratory distress (eg. Use of accessory muscles. Peds grunting: Monitor maternal/fetal well-being  Goal: Tolerate pulmonary toileting this shift  Outcome: Progressing  Flowsheets (Taken 4/21/2025 1116)  Tolerate pulmonary toileting this shift:   Evaluate chest drainage   Positioning to promote ventilation/comfort  Goal: Verbalize decreased shortness of breath this shift  Outcome: Progressing  Flowsheets (Taken 4/21/2025 1116)  Verbalize decreased shortness of breath this shift:   Encourage/provide pulmonary hygiene/secretion clearance   Incentive spirometry  Goal: Wean oxygen to maintain O2 saturation per order/standard this shift  Outcome: Progressing  Flowsheets (Taken 4/21/2025 1116)  Wean oxygen to maintain O2 saturation per order/standard this shift:   Encourage activity/mobility   Incentive spirometry  Goal: Increase self care and/or family involvement in next 24 hours  Outcome: Progressing  Flowsheets (Taken 4/21/2025 1116)  Increase self care and/or family involvement in next 24 hours:   Asthma home management plan   Encourage activity/mobility     Problem: Diabetes  Goal: Achieve decreasing blood glucose levels by end of shift  Outcome: Progressing  Flowsheets (Taken 4/21/2025 1116)  Achieve decreasing blood glucose levels by end of shift:   Med administration/monitoring of effect   Self monitor blood glucose with staff oversight  Goal: Increase stability of blood glucose readings by end of shift  Outcome: Progressing  Flowsheets (Taken 4/21/2025 1116)  Increase stability of blood glucose readings by end of shift: Med administration/monitoring of effect  Goal: Decrease in ketones present in urine by end of shift  Outcome: Progressing  Flowsheets (Taken 4/21/2025 1116)  Decrease in ketones present in urine by end of shift:   Monitor  urine output   Med administration/monitoring of effect  Goal: Maintain electrolyte levels within acceptable range throughout shift  Outcome: Progressing  Flowsheets (Taken 4/21/2025 1116)  Maintain electrolyte levels within acceptable range throughout shift:   Med administration/monitoring of effect   Monitor urine output  Goal: Maintain glucose levels >70mg/dl to <250mg/dl throughout shift  Outcome: Progressing  Flowsheets (Taken 4/21/2025 1116)  Maintain glucose levels >70mg/dl to <250mg/dl throughout shift:   Med administration/monitoring of effect   Self monitor blood glucose with staff oversight  Goal: No changes in neurological exam by end of shift  Outcome: Progressing  Flowsheets (Taken 4/21/2025 1116)  No changes in neurological exam by end of shift: Complete frequent neurological assessments  Goal: Learn about and adhere to nutrition recommendations by end of shift  Outcome: Progressing  Flowsheets (Taken 4/21/2025 1116)  Learn about and adhere to nutrition recommendations by end of shift: Ensure/encourage compliance with appropriate diet  Goal: Vital signs within normal range for age by end of shift  Outcome: Progressing  Flowsheets (Taken 4/21/2025 1116)  Vital signs within normal range for age by end of shift: Med administration/monitoring of effect  Goal: Increase self care and/or family involovement by end of shift  Outcome: Progressing  Flowsheets (Taken 4/21/2025 1116)  Increase self care and/or family involovement by end of shift: Self monitor blood glucose with staff oversight  Goal: Receive DSME education by end of shift  Outcome: Progressing  Flowsheets (Taken 4/21/2025 1116)  Receive DSME education by end of shift: Provide patient centered education on Diabetic Self Management Education

## 2025-04-21 NOTE — PROGRESS NOTES
Physical Therapy    Physical Therapy Treatment    Patient Name: Ariel Rodríguez  MRN: [id number]  Department: [facility] [id number]  Room: [address] [place] [address]  Today's Date: 4/21/2025  Time Calculation  Start Time: 0903  Stop Time: 0930  Time Calculation (min): 27 min         Assessment/Plan   PT Assessment  Rehab Prognosis: Good  Barriers to Discharge Home: Caregiver assistance, Physical needs  Caregiver Assistance: Caregiver assistance needed per identified barriers - however, level of patient's required assistance exceeds assistance available at home  Physical Needs: Stair navigation into home limited by function/safety, In-home setup navigation limited by function/safety, Ambulating household distances limited by function/safety, Intermittent mobility assistance needed, Intermittent ADL assistance needed, High falls risk due to function or environment  End of Session Communication: Bedside nurse  Assessment Comment: Pt requires physical assist for transfers, seated and standing dynamic balance. Elevated HR noted at rest and with ambulation.  End of Session Patient Position: Up in chair, Alarm on  PT Plan  Inpatient/Swing Bed or Outpatient: Inpatient  PT Plan  Treatment/Interventions: Bed mobility, Transfer training, Gait training  PT Plan: Ongoing PT  PT Frequency: 3 times per week  PT Discharge Recommendations: Moderate intensity level of continued care  PT Recommended Transfer Status: Assist x1  PT - OK to Discharge: Yes (per POC)      General Visit Information:   PT  Visit  PT Received On: 04/21/25  General  Reason for Referral: decline in ADls; 89 yo presenting with worsening SOB and LE edema. Acute on chronic diastolic heart failure with increasing right edema and moderate-sized right pleural effusion and acute hypoxic respiratory failure. s/p Thoracentesis 4/18 with 1300cc fluid removal  Referred By: Loni Galo, APRN-CNP  Past Medical History Relevant to Rehab: Medical History[1]    Prior to Session Communication: Bedside  nurse (RN cleared)  Patient Position Received: Bed, 3 rail up, Alarm on    Subjective   Precautions:  Precautions  Hearing/Visual Limitations: hard of hearing  Medical Precautions: Fall precautions     Date/Time Vitals Session Patient Position Pulse Resp SpO2 BP MAP (mmHg)    04/21/25 1150 --  --  93  18  90 %  154/125  135     04/21/25 1240 --  --  --  --  --  108/67  81                 Objective   Pain:  Pain Assessment  Pain Assessment: 0-10  0-10 (Numeric) Pain Score: 0 - No pain  Cognition:  Cognition  Orientation Level: Disoriented to time       Postural Control:  Static Sitting Balance  Static Sitting-Balance Support: Bilateral upper extremity supported  Static Sitting-Level of Assistance: Close supervision  Dynamic Sitting Balance  Dynamic Sitting-Balance Support: Feet supported, No upper extremity supported  Dynamic Sitting-Level of Assistance: Close supervision  Dynamic Sitting-Balance: Lateral lean, Forward lean, Reaching for objects, Reaching across midline  Dynamic Sitting-Comments: pt required close supervision, pt able to return to midline wihout physical assist, VC to return to midline  Static Standing Balance  Static Standing-Balance Support: Bilateral upper extremity supported  Static Standing-Level of Assistance: Contact guard  Dynamic Standing Balance  Dynamic Standing-Balance Support: Left upper extremity supported  Dynamic Standing-Level of Assistance: Minimum assistance  Dynamic Standing-Balance: Lateral lean, Forward lean, Reaching for objects, Reaching across midline  Dynamic Standing-Comments: Pt requires Jj for retropulsion and fall prevention.      Activity Tolerance:  Activity Tolerance  Activity Tolerance Comments: HR elevation  Treatments:            Bed Mobility  Bed Mobility: Yes  Bed Mobility 1  Bed Mobility 1: Supine to sitting  Level of Assistance 1: Close supervision  Bed Mobility Comments 1: HOB elevated. Pt requires min VC for correct sequening. Use of bed rail  required.    Ambulation/Gait Training  Ambulation/Gait Training Performed: Yes  Ambulation/Gait Training 1  Surface 1: Level tile  Device 1: Rolling walker  Assistance 1: Contact guard, Minimum assistance  Quality of Gait 1: Diminished heel strike, Decreased step length, Forward flexed posture  Comments/Distance (ft) 1: 10, 40 x 2 (Pt requires WW management and Jj for unsteadiness. Pt progresses to contact guard. Max VC for upright posture and forward gaze.)  Transfers  Transfer: Yes  Transfer 1  Transfer From 1: Bed to  Transfer to 1: Stand  Technique 1: Sit to stand  Transfer Device 1: Walker  Transfer Level of Assistance 1: Minimum assistance  Trials/Comments 1: Jj for trunk elevation. Pt requires max VC for correct hand placement and sequencing.  Transfers 2  Transfer From 2: Toilet to  Transfer to 2: Stand  Technique 2: Sit to stand, Stand to sit  Transfer Device 2: Walker  Transfer Level of Assistance 2: Minimum assistance  Trials/Comments 2: Jj for trunk elevation, anterior weight shift, and eccentric control. Pt required VC for correct hand placement and safety.  Transfers 3  Transfer From 3: Chair with arms to  Transfer to 3: Stand  Technique 3: Sit to stand, Stand to sit  Transfer Device 3: Walker  Transfer Level of Assistance 3: Contact guard  Trials/Comments 3: Contact guard for safety. Pt demonstrates improved transfers from chair although requires VC for correct hand placement and safety.    Outcome Measures:  [facility] Basic Mobility  Turning from your back to your side while in a flat bed without using bedrails: A little  Moving from lying on your back to sitting on the side of a flat bed without using bedrails: A little  Moving to and from bed to chair (including a wheelchair): A little  Standing up from a chair using your arms (e.g. wheelchair or bedside chair): A little  To walk in hospital room: A little  Climbing 3-5 steps with railing: Total  Basic Mobility - Total Score: 16    Education  Documentation  Precautions, taught by Guerline Rajput PTA at 4/21/2025  1:13 PM.  Learner: Patient  Readiness: Acceptance  Method: Explanation  Response: Verbalizes Understanding    Body Mechanics, taught by Guerline Rajput PTA at 4/21/2025  1:13 PM.  Learner: Patient  Readiness: Acceptance  Method: Explanation  Response: Verbalizes Understanding    Mobility Training, taught by Guerline Rajput PTA at 4/21/2025  1:13 PM.  Learner: Patient  Readiness: Acceptance  Method: Explanation  Response: Verbalizes Understanding    Education Comments  No comments found.        Encounter Problems       Encounter Problems (Active)       Balance       complete all mobility with normal balance while dual tasking, negotiating in a dynamic environment, carrying items, etc., with proactive and reactive static and dynamic standing and sitting tasks, with mod I and LRAD, >15 minutes.   (Progressing)       Start:  04/18/25    Expected End:  05/02/25               Mobility       STG - Patient will ambulate 150 ft mod I using LRAD with stable vitals.  (Progressing)       Start:  04/18/25    Expected End:  05/02/25            STG - Patient will ascend and descend four to six stairs       Start:  04/18/25               PT Transfers       STG - Patient will perform bed mobility independently. (Progressing)       Start:  04/18/25    Expected End:  05/02/25            STG - Patient will transfer sit to and from stand mod I with LRAD.  (Not Progressing)       Start:  04/18/25    Expected End:  05/02/25               Pain - Adult                   [1]   Past Medical History:  Diagnosis Date    Acute myocardial infarction, unspecified 11/09/2022    Acute myocardial infarction    Acute upper respiratory infection, unspecified 04/10/2018    Viral URI    Atherosclerotic heart disease of native coronary artery with unspecified angina pectoris 11/21/2022    Athscl heart disease of native cor art w unsp ang pctrs    Benign neoplasm of  colon, unspecified 08/26/2016    Colonic adenoma    Encounter for screening for malignant neoplasm of colon 07/24/2014    Colon cancer screening    Hyperglycemia 01/26/2023    Hyponatremia 01/26/2023    Obesity, unspecified 10/28/2020    Class 1 obesity with body mass index (BMI) of 32.0 to 32.9 in adult    Obesity, unspecified 11/09/2022    Obesity, Class I, BMI 30.0-34.9 (see actual BMI)    Olecranon bursitis, unspecified elbow 02/19/2020    Olecranon bursitis    Osteoarthritis of knee, unspecified 10/03/2017    Osteoarthrosis of knee    Other fecal abnormalities 08/26/2016    Guaiac positive stools    Personal history of other diseases of the circulatory system 08/17/2016    History of angina pectoris    Personal history of other diseases of the musculoskeletal system and connective tissue     History of arthritis    Personal history of other diseases of the nervous system and sense organs     History of cataract    Personal history of other diseases of the respiratory system 04/10/2018    History of acute bronchitis    Personal history of other drug therapy 10/01/2019    History of influenza vaccination    Personal history of other endocrine, nutritional and metabolic disease 12/26/2018    History of vitamin D deficiency    Personal history of other endocrine, nutritional and metabolic disease 02/26/2015    History of hypokalemia    Personal history of other specified conditions     History of heartburn    Personal history of thrombophlebitis 12/29/2015    History of deep vein thrombophlebitis of lower extremity    Pure hypercholesterolemia, unspecified 01/02/2019    Pure hypercholesterolemia, unspecified    Supratherapeutic INR 06/11/2024    Unspecified atrial fibrillation (Multi) 01/03/2023    Atrial fibrillation    Unspecified injury of head, sequela 11/29/2019    CHI (closed head injury), sequela

## 2025-04-21 NOTE — PROGRESS NOTES
04/21/25 0813   Intensity of Service   Intensity of Service 0-30 min     PT rec mod intensity at discharge.  Will request SW discuss SNF with patient and/or his spouse.  PLAN/BARRIER: tele, CT chest, IV dig, cardiology, IR consult  DISP: TBD  ADOD: 1-3 days  Patient is not med ready  Esther Salmeron RN     4/21/25 @ 1300  Patient has refused SNF and homecare.  Will go home no needs when medically cleared.  Esther Salmeron RN

## 2025-04-21 NOTE — PROGRESS NOTES
Ariel Rodrgíuez is a 89 y.o. male on day 4 of admission presenting with Biventricular congestive heart failure.    Subjective   Patient seen and examined, he is coming along fine his breathing is improving and his leg edema has almost completely resolved.  Blood pressures are rather elevated today at 150/90 mmHg.  Remains in atrial fibrillation with heart rate above 100 bpm.  He was resumed on his metoprolol other days and he also was loaded with digoxin on recommendation of cardiology.  Since his heart rate is significantly increased today he has been placed back on Cardizem CD okay to 240 mg daily on recommendation of Dr. June.  Repeat chest x-ray still revealed moderate right pleural effusion and a small left pulm effusion.  CT scan of the chest was also done today which revealed moderate right pleural effusion and small left pleural effusion.  Patient is status post right thoracocentesis on admission and 1300 cc of pleural fluid were removed and pleural fluid analysis was consistent with transudate.       Objective     Physical Exam    GENERAL:  Alert, mild distress, cooperative  SKIN:  Skin color, texture, turgor normal. No rashes or lesions.  OROPHARYNX:  Lips, mucosa, and tongue are normal.Teeth and gums, normal. Oropharynx normal.  NECK:  No jugulovenous distention, No carotid bruits, Carotid pulse normal contour, Supple  LUNGS: Improved air exchange, status post right thoracocentesis and 1300 cc of pleural fluid was removed on 4/18/25  CARDIAC: Atrial fibrillation with variable rate around 110 bpm, normal S1 and S2; no rubs,  or gallops, 2/6 systolic ejection murmur left sternal border, CHF improving  ABDOMEN:  Abdomen soft, non-tender, BS normal, No masses or organomegaly  EXTREMETIES:  Extremities normal, no deformities, trace edema,no clubbing   NEURO:  Alert, oriented X 3, Non-focal. PULSES:  2+ radial, 2+ carotid  Last Recorded Vitals   Intake/Output last 3 Shifts:  I/O last 3 completed  shifts:  In: - (0 mL/kg)   Out: 1400 (18.2 mL/kg) [Urine:1400 (0.5 mL/kg/hr)]  Weight: 77.1 kg     Relevant Results    Scheduled medications  Scheduled Medications[1]  Continuous medications  Continuous Medications[2]  PRN medications  PRN Medications[3]   Results for orders placed or performed during the hospital encounter of 04/17/25 (from the past 96 hours)   POCT GLUCOSE   Result Value Ref Range    POCT Glucose 151 (H) 74 - 99 mg/dL   POCT GLUCOSE   Result Value Ref Range    POCT Glucose 171 (H) 74 - 99 mg/dL   Basic metabolic panel   Result Value Ref Range    Glucose 122 (H) 74 - 99 mg/dL    Sodium 136 136 - 145 mmol/L    Potassium 3.6 3.5 - 5.3 mmol/L    Chloride 94 (L) 98 - 107 mmol/L    Bicarbonate 35 (H) 21 - 32 mmol/L    Anion Gap 11 10 - 20 mmol/L    Urea Nitrogen 21 6 - 23 mg/dL    Creatinine 0.96 0.50 - 1.30 mg/dL    eGFR 75 >60 mL/min/1.73m*2    Calcium 8.7 8.6 - 10.3 mg/dL   CBC   Result Value Ref Range    WBC 6.5 4.4 - 11.3 x10*3/uL    nRBC 0.0 0.0 - 0.0 /100 WBCs    RBC 3.53 (L) 4.50 - 5.90 x10*6/uL    Hemoglobin 10.3 (L) 13.5 - 17.5 g/dL    Hematocrit 31.8 (L) 41.0 - 52.0 %    MCV 90 80 - 100 fL    MCH 29.2 26.0 - 34.0 pg    MCHC 32.4 32.0 - 36.0 g/dL    RDW 15.7 (H) 11.5 - 14.5 %    Platelets 164 150 - 450 x10*3/uL   Magnesium   Result Value Ref Range    Magnesium 2.05 1.60 - 2.40 mg/dL   Protime-INR   Result Value Ref Range    Protime 29.3 (H) 9.8 - 12.4 seconds    INR 2.6 (H) 0.9 - 1.1   Lactate Dehydrogenase   Result Value Ref Range     84 - 246 U/L   Protein, Total   Result Value Ref Range    Total Protein 7.3 6.4 - 8.2 g/dL   Transthoracic Echo (TTE) Complete   Result Value Ref Range    AV pk sen 2.27 m/s    AV mn grad 12 mmHg    LVOT diam 2.29 cm    LA vol index A/L 88.1 ml/m2    Tricuspid annular plane systolic excursion 1.8 cm    LV EF 60 %    RV free wall pk S' 16.00 cm/s    LVIDd 4.29 cm    RVSP 46.4 mmHg    Aortic Valve Area by Continuity of VTI 1.96 cm2    Aortic Valve Area by  Continuity of Peak Velocity 1.71 cm2    AV pk grad 21 mmHg    LV A4C EF 62.2    pH, Body Fluid   Result Value Ref Range    pH, Fluid 7.78 See Below   Sterile Fluid Culture/Smear    Specimen: Pleural; Fluid   Result Value Ref Range    Sterile Fluid Culture/Smear No growth to date     Gram Stain (3+) Moderate Polymorphonuclear leukocytes     Gram Stain No organisms seen    AFB Culture/Smear    Specimen: Pleural; Fluid   Result Value Ref Range    AFB Culture       Culture in progress and will be examined weekly. A result will be issued either when positive or after 8 weeks incubation.    AFB Stain No acid fast bacilli seen    Fungal Culture/Smear    Specimen: Pleural; Fluid   Result Value Ref Range    Fungal Culture/Smear       Culture in progress, a report will be issued when positive or after 2 weeks of incubation.    Fungal Smear No fungal elements seen    AFB Processed   Result Value Ref Range    Extra Tube Hold for add-ons.    Lactate Dehydrogenase, Body Fluid   Result Value Ref Range    LD, Fluid 105 Not established. U/L   Protein, Total, Body Fluid   Result Value Ref Range    Protein, Total Fluid 4.7 Not established g/dL   Body Fluid Cell Count   Result Value Ref Range    Color, Fluid Yellow Colorless, Straw, Yellow    Clarity, Fluid Hazy (A) Clear    WBC, Fluid 267 See Comment /uL    RBC, Fluid 3,000 see comment /uL   Body Fluid Differential   Result Value Ref Range    Neutrophils %, Manual, Fluid 5 see comment %    Lymphocytes %, Manual, Fluid 26 see comment %    Mono/Macrophages %, Manual, Fluid 69 see comment %    Eosinophils %, Manual, Fluid 0 see comment %    Basophils %, Manual, Fluid 0 not established %    Immature Granulocytes %, Manual, Fluid 0 not established %    Blasts %, Manual, Fluid 0 not established %    Unclassified Cells %, Manual, Fluid 0 not established %    Plasma Cells %, Manual, Fluid 0 not established %    Total Cells Counted, Fluid 100    POCT GLUCOSE   Result Value Ref Range    POCT  Glucose 113 (H) 74 - 99 mg/dL   POCT GLUCOSE   Result Value Ref Range    POCT Glucose 162 (H) 74 - 99 mg/dL   POCT GLUCOSE   Result Value Ref Range    POCT Glucose 161 (H) 74 - 99 mg/dL   Basic metabolic panel   Result Value Ref Range    Glucose 114 (H) 74 - 99 mg/dL    Sodium 135 (L) 136 - 145 mmol/L    Potassium 3.4 (L) 3.5 - 5.3 mmol/L    Chloride 91 (L) 98 - 107 mmol/L    Bicarbonate 36 (H) 21 - 32 mmol/L    Anion Gap 11 10 - 20 mmol/L    Urea Nitrogen 24 (H) 6 - 23 mg/dL    Creatinine 0.96 0.50 - 1.30 mg/dL    eGFR 75 >60 mL/min/1.73m*2    Calcium 8.8 8.6 - 10.3 mg/dL   CBC   Result Value Ref Range    WBC 10.4 4.4 - 11.3 x10*3/uL    nRBC 0.0 0.0 - 0.0 /100 WBCs    RBC 3.76 (L) 4.50 - 5.90 x10*6/uL    Hemoglobin 11.1 (L) 13.5 - 17.5 g/dL    Hematocrit 33.2 (L) 41.0 - 52.0 %    MCV 88 80 - 100 fL    MCH 29.5 26.0 - 34.0 pg    MCHC 33.4 32.0 - 36.0 g/dL    RDW 15.4 (H) 11.5 - 14.5 %    Platelets 156 150 - 450 x10*3/uL   Magnesium   Result Value Ref Range    Magnesium 1.84 1.60 - 2.40 mg/dL   Protime-INR   Result Value Ref Range    Protime 26.0 (H) 9.8 - 12.4 seconds    INR 2.3 (H) 0.9 - 1.1   POCT GLUCOSE   Result Value Ref Range    POCT Glucose 112 (H) 74 - 99 mg/dL   POCT GLUCOSE   Result Value Ref Range    POCT Glucose 197 (H) 74 - 99 mg/dL   POCT GLUCOSE   Result Value Ref Range    POCT Glucose 142 (H) 74 - 99 mg/dL   Electrocardiogram, 12-lead PRN ACS symptoms   Result Value Ref Range    Ventricular Rate 130 BPM    Atrial Rate 119 BPM    QRS Duration 84 ms    QT Interval 280 ms    QTC Calculation(Bazett) 412 ms    R Axis 24 degrees    T Axis 5 degrees    QRS Count 21 beats    Q Onset 220 ms    T Offset 360 ms    QTC Fredericia 362 ms   POCT GLUCOSE   Result Value Ref Range    POCT Glucose 134 (H) 74 - 99 mg/dL   Basic metabolic panel   Result Value Ref Range    Glucose 117 (H) 74 - 99 mg/dL    Sodium 135 (L) 136 - 145 mmol/L    Potassium 3.7 3.5 - 5.3 mmol/L    Chloride 90 (L) 98 - 107 mmol/L    Bicarbonate  36 (H) 21 - 32 mmol/L    Anion Gap 13 10 - 20 mmol/L    Urea Nitrogen 27 (H) 6 - 23 mg/dL    Creatinine 0.96 0.50 - 1.30 mg/dL    eGFR 75 >60 mL/min/1.73m*2    Calcium 8.7 8.6 - 10.3 mg/dL   CBC   Result Value Ref Range    WBC 9.2 4.4 - 11.3 x10*3/uL    nRBC 0.0 0.0 - 0.0 /100 WBCs    RBC 3.80 (L) 4.50 - 5.90 x10*6/uL    Hemoglobin 11.0 (L) 13.5 - 17.5 g/dL    Hematocrit 33.6 (L) 41.0 - 52.0 %    MCV 88 80 - 100 fL    MCH 28.9 26.0 - 34.0 pg    MCHC 32.7 32.0 - 36.0 g/dL    RDW 15.3 (H) 11.5 - 14.5 %    Platelets 160 150 - 450 x10*3/uL   Magnesium   Result Value Ref Range    Magnesium 1.85 1.60 - 2.40 mg/dL   Protime-INR   Result Value Ref Range    Protime 25.9 (H) 9.8 - 12.4 seconds    INR 2.3 (H) 0.9 - 1.1   POCT GLUCOSE   Result Value Ref Range    POCT Glucose 137 (H) 74 - 99 mg/dL   POCT GLUCOSE   Result Value Ref Range    POCT Glucose 170 (H) 74 - 99 mg/dL   POCT GLUCOSE   Result Value Ref Range    POCT Glucose 176 (H) 74 - 99 mg/dL   POCT GLUCOSE   Result Value Ref Range    POCT Glucose 154 (H) 74 - 99 mg/dL   Basic metabolic panel   Result Value Ref Range    Glucose 125 (H) 74 - 99 mg/dL    Sodium 134 (L) 136 - 145 mmol/L    Potassium 3.4 (L) 3.5 - 5.3 mmol/L    Chloride 88 (L) 98 - 107 mmol/L    Bicarbonate 40 (HH) 21 - 32 mmol/L    Anion Gap 9 (L) 10 - 20 mmol/L    Urea Nitrogen 33 (H) 6 - 23 mg/dL    Creatinine 1.00 0.50 - 1.30 mg/dL    eGFR 71 >60 mL/min/1.73m*2    Calcium 9.2 8.6 - 10.3 mg/dL   CBC   Result Value Ref Range    WBC 8.1 4.4 - 11.3 x10*3/uL    nRBC 0.0 0.0 - 0.0 /100 WBCs    RBC 4.00 (L) 4.50 - 5.90 x10*6/uL    Hemoglobin 11.8 (L) 13.5 - 17.5 g/dL    Hematocrit 35.5 (L) 41.0 - 52.0 %    MCV 89 80 - 100 fL    MCH 29.5 26.0 - 34.0 pg    MCHC 33.2 32.0 - 36.0 g/dL    RDW 14.7 (H) 11.5 - 14.5 %    Platelets 186 150 - 450 x10*3/uL   Magnesium   Result Value Ref Range    Magnesium 1.97 1.60 - 2.40 mg/dL   Protime-INR   Result Value Ref Range    Protime 26.8 (H) 9.8 - 12.4 seconds    INR 2.4 (H)  0.9 - 1.1   POCT GLUCOSE   Result Value Ref Range    POCT Glucose 176 (H) 74 - 99 mg/dL   POCT GLUCOSE   Result Value Ref Range    POCT Glucose 157 (H) 74 - 99 mg/dL     *Note: Due to a large number of results and/or encounters for the requested time period, some results have not been displayed. A complete set of results can be found in Results Review.    CT chest wo IV contrast  Result Date: 4/21/2025  Interpreted By:  Prabha Hobbs, STUDY: CT CHEST WO IV CONTRAST;  4/21/2025 11:23 am   INDICATION: Signs/Symptoms:dyspnea.   COMPARISON: 01/09/2024   ACCESSION NUMBER(S): [id number]   ORDERING CLINICIAN: LITA ARRIAGA   TECHNIQUE: Helical data acquisition of the chest was obtained without intravenous contrast. Images were reformatted in axial, coronal, and sagittal planes.   FINDINGS:   LIMITATIONS: Please note that the evaluation of vessels, lymph nodes and organs is limited without intravenous contrast. Breathing motion artifact is present which degrades the lung windows especially.   LUNGS AND AIRWAYS: The trachea and central airways are patent.   There is moderate-to-large right pleural effusion. Patchy density seen adjacent to the a right pleural effusion in the right lung base, which may be related to atelectasis or infiltrate   No left pleural effusion is seen. No pneumothorax is identified.   Elevation of the right hemidiaphragm is again noted.   MEDIASTINUM AND MAGNO, LOWER NECK AND AXILLA: The visualized thyroid gland is within normal limits.   No evidence of thoracic lymphadenopathy by CT criteria.   Esophagus appears within normal limits as seen.   HEART AND VESSELS: The thoracic aorta is of normal course and caliber with vascular calcifications.   Main pulmonary artery and its branches are normal in caliber.   Coronary artery calcifications are seen. The study is not optimized for evaluation of coronary arteries.   The heart is enlarged.   Pericardial effusion is seen, which has increased since the prior  exam.   UPPER ABDOMEN: The visualized subdiaphragmatic structures demonstrate no remarkable findings.   CHEST WALL AND OSSEOUS STRUCTURES: There are no suspicious osseous lesions. Multilevel degenerative changes are present       1.  Moderate to large right pleural effusion. 2. Cardiomegaly. 3. Pericardial effusion which has increased in size since the prior exam.   4. Patchy density in the right lung base adjacent to the pleural effusion, which may be related to atelectasis or infiltrate.   MACRO: None   Signed by: Prabha Hobbs 4/21/2025 11:48 AM Dictation workstation:   [id number]    XR chest 2 views  Result Date: 4/21/2025  Interpreted By:  Prabha Hobbs, STUDY: XR CHEST 2 VIEWS;  4/21/2025 11:03 am   INDICATION: Signs/Symptoms:Pleural effusion.   COMPARISON: 04/18/2025   ACCESSION NUMBER(S): [id number]   ORDERING CLINICIAN: MARIAN MORA   FINDINGS: Blunting of both costophrenic angles is seen, right greater than left, which most likely related to pleural effusions. These have not significantly changed. Bibasilar patchy densities are noted. The heart is enlarged. No pneumothorax is seen.       Bilateral pleural effusions, right greater than left, without significant change.   Bibasilar patchy densities, which may be related to atelectasis or infiltrates.   Cardiomegaly.     MACRO: None   Signed by: Prabha Hobbs 4/21/2025 11:06 AM Dictation workstation:   [id number]    Electrocardiogram, 12-lead PRN ACS symptoms  Result Date: 4/21/2025  Atrial fibrillation with rapid ventricular response Abnormal ECG When compared with ECG of 17-APR-2025 12:20, Vent. rate has increased BY  66 BPM Questionable change in QRS axis    US thoracentesis  Result Date: 4/18/2025  Interpreted By:  Alex Tabares, STUDY: US THORACENTESIS4/18/20253:15 pm   INDICATION: Signs/Symptoms:right sided pleural effusion   COMPARISON: CXR 4/17/25   ACCESSION NUMBER(S): [id number]   ORDERING CLINICIAN: ROSEMARIE CHAVEZ   TECHNIQUE:  INTERVENTIONALIST(S): Alex Tabares   CONSENT: The patient/patient's POA/next of kin was informed of the nature of the proposed procedure. The purposes, alternatives, risks, and benefits were explained and discussed. All questions were answered and consent was obtained.   SEDATION: None   MEDICATION/CONTRAST: 1% lidocaine was used to anesthetize subcutaneously.   TIME OUT: A time out was performed immediately prior to procedure start with the interventional team, correctly identifying the patient name, date of birth, MRN, procedure, anatomy (including marking of site and side), patient position, procedure consent form, relevant laboratory and imaging test results, antibiotic administration, safety precautions, and procedure-specific equipment needs.   FINDINGS: The patient was placed in the sitting position.   The pleural space was examined with grey scale ultrasound, and the most accessible fluid identified and marked for thoracentesis.   The skin was prepped and draped in usual manner. Local anesthesia with lidocaine was administered and a right-sided thoracentesis was performed.  A 5 [place]ian One-Step Valved thoracentesis needle/catheter was then placed where marked. Approximately 1,300 mL of yellowish colored fluid was removed.  The needle/catheter was then withdrawn.   The patient tolerated the procedure well and there were no immediate complications. Specimen(s) sent to the laboratory for further evaluation, per the requesting team.       Uneventful thoracentesis, as detailed above. Right pleural space, 1,300 mL   I personally performed and/or directly supervised this study and was present for the entire procedure.   Performed and dictated at [facility].   Signed by: Alex Tabares 4/18/2025 3:15 PM Dictation workstation:   [id number]    Transthoracic Echo (TTE) Complete  Result Date: 4/18/2025   [facility], [address] [place] [address]              Tel  [phone] and Fax [phone] TRANSTHORACIC ECHOCARDIOGRAM REPORT  Patient Name:       PHOENIX DAVIS   Reading Physician:    [id number] Trino June DO Study Date:         4/18/2025           Ordering Provider:    [id number] TRINO CARLEY WHEELERMOND MRN/PID:            [id number]            Fellow: Accession#:         [id number]        Nurse: Date of Birth/Age:  11/22/1934 / 89     Sonographer:          Maria Fernanda Stockton RDCS                     years Gender assigned at                     Additional Staff: Birth: Height:             160.02 cm           Admit Date:           4/17/2025 Weight:             77.11 kg            Admission Status:     Inpatient -                                                               Routine BSA / BMI:          1.80 m2 / 30.11     Encounter#:           [id number]                     kg/m2 Blood Pressure:     131/78 mmHg         Department Location:  [facility] Non                                                               Invasive Study Type:    TRANSTHORACIC ECHO (TTE) COMPLETE Diagnosis/ICD: Heart failure, unspecified-I50.9; Abnormal electrocardiogram                [ECG] [EKG]-R94.31 Indication:    CHF, abn EKG, afib CPT Code:      Echo Complete w Full Doppler-[id number] Patient History: Pertinent History: HTN and A-Fib. Atrial flutter, bradycardia, unstable angina,                    NSTEMI. Study Detail: The following Echo studies were performed: 2D, M-Mode, Doppler and               color flow. Unable to obtain subcostal and poor subcostal window               view.  PHYSICIAN INTERPRETATION: Left Ventricle: Left ventricular ejection fraction is normal, calculated by Rodriguez's biplane at 60%. There are no regional left ventricular wall motion abnormalities. The left ventricular cavity size is normal. There is mildly increased septal and normal posterior left ventricular  wall thickness. There is left ventricular concentric remodeling. Left ventricular diastolic filling cannot be determined due to atrial fibrillation/flutter. Left Atrium: The left atrial size is severely dilated. Right Ventricle: The right ventricle is normal in size. There is normal right ventricular global systolic function. Right Atrium: The right atrium is severely dilated. Aortic Valve: The aortic valve is trileaflet. The aortic valve dimensionless index is 0.48. There is no evidence of aortic valve regurgitation. The peak instantaneous gradient of the aortic valve is 21 mmHg. The mean gradient of the aortic valve is 12 mmHg. Mitral Valve: The mitral valve is normal in structure. The mitral valve spectral Doppler A-wave is absent, consistent with atrial fibrillation. There is trace mitral valve regurgitation. Tricuspid Valve: The tricuspid valve is structurally normal. There is trace tricuspid regurgitation. The Doppler estimated RVSP is mildly elevated at 46.4 mmHg. Pulmonic Valve: The pulmonic valve is structurally normal. There is no indication of pulmonic valve regurgitation. Pericardium: Small to moderate pericardial effusion. Aorta: The aortic root is normal. Systemic Veins: The inferior vena cava was not well visualized. In comparison to the previous echocardiogram(s): Compared with study dated 6/28/2023, pericardial effusion has increased.  CONCLUSIONS:  1. Left ventricular ejection fraction is normal, calculated by Rodriguez's biplane at 60%.  2. The left atrial size is severely dilated.  3. The right atrium is severely dilated.  4. Small to moderate pericardial effusion.  5. Absent A-wave on MV spectral Doppler tracing, consistent with atrial fibrillation.  6. Mildly elevated right ventricular systolic pressure.  7. Compared with study dated 6/28/2023, pericardial effusion has increased. QUANTITATIVE DATA SUMMARY:  2D MEASUREMENTS:          Normal Ranges: LAs:             5.77 cm  (2.7-4.0cm) IVSd:             1.22 cm  (0.6-1.1cm) LVPWd:           0.98 cm  (0.6-1.1cm) LVIDd:           4.29 cm  (3.9-5.9cm) LVIDs:           2.89 cm LV Mass Index:   90 g/m2 LVEDV Index:     43 ml/m2 LV % FS          32.7 %  LEFT ATRIUM:                  Normal Ranges: LA Vol A4C:        141.3 ml   (22+/-6mL/m2) LA Vol A2C:        156.9 ml LA Vol BP:         159.1 ml LA Vol Index A4C:  78.3ml/m2 LA Vol Index A2C:  86.9 ml/m2 LA Vol Index BP:   88.1 ml/m2 LA Area A4C:       36.7 cm2 LA Area A2C:       36.2 cm2 LA Major Axis A4C: 8.1 cm LA Major Axis A2C: 7.1 cm LA Volume Index:   88.1 ml/m2 LA Vol A4C:        134.4 ml LA Vol A2C:        147.7 ml LA Vol Index BSA:  78.2 ml/m2  RIGHT ATRIUM:                 Normal Ranges: RA Vol A4C:        113.8 ml   (8.3-19.5ml) RA Vol Index A4C:  63.1 ml/m2 RA Area A4C:       30.4 cm2 RA Major Axis A4C: 6.9 cm  M-MODE MEASUREMENTS:         Normal Ranges: LAs:                 6.23 cm (2.7-4.0cm)  AORTA MEASUREMENTS:         Normal Ranges: Ao Sinus, d:        3.10 cm (2.1-3.5cm) Ao STJ, d:          2.50 cm (1.7-3.4cm) Asc Ao, d:          3.20 cm (2.1-3.4cm)  LV SYSTOLIC FUNCTION:                      Normal Ranges: EF-A4C View:    62 % (>=55%) EF-A2C View:    57 % EF-Biplane:     60 % LV EF Reported: 60 %  AORTIC VALVE:                      Normal Ranges: AoV Vmax:                2.27 m/s  (<=1.7m/s) AoV Peak P.6 mmHg (<20mmHg) AoV Mean P.1 mmHg (1.7-11.5mmHg) LVOT Max Rex:            0.94 m/s  (<=1.1m/s) AoV VTI:                 39.15 cm  (18-25cm) LVOT VTI:                18.61 cm LVOT Diameter:           2.29 cm   (1.8-2.4cm) AoV Area, VTI:           1.96 cm2  (2.5-5.5cm2) AoV Area,Vmax:           1.71 cm2  (2.5-4.5cm2) AoV Dimensionless Index: 0.48  RIGHT VENTRICLE: RV Basal 4.50 cm RV Mid   3.80 cm RV Major 6.1 cm TAPSE:   18.0 mm RV s'    0.16 m/s  TRICUSPID VALVE/RVSP:          Normal Ranges: Peak TR Velocity:     3.30 m/s Est. RA Pressure:     3 mmHg RV Syst  Pressure:     46 mmHg  (< 30mmHg)  PULMONIC VALVE:          Normal Ranges: PV Accel Time:  63 msec  (>120ms) PV Max Rex:     1.2 m/s  (0.6-0.9m/s) PV Max P.1 mmHg  AORTA: Asc Ao Diam 3.22 cm  [id number] Trino June  Electronically signed on 2025 at 12:33:15 PM  ** Final **     XR chest 1 view  Result Date: 2025  Interpreted By:  Rainer Wyatt, STUDY: XR CHEST 1 VIEW;  2025 11:19 am   INDICATION: Signs/Symptoms:S/P Thora.   COMPARISON: Chest x-ray from 2025. CT scan chest from 2024.   ACCESSION NUMBER(S): [id number]   ORDERING CLINICIAN: DIEGO JONES   TECHNIQUE: Single AP portable view of the chest was obtained.   FINDINGS: MEDIASTINUM/ LUNGS/ MAGNO: Suboptimal level of inspiration. There is still a small to moderate-sized right pleural effusion, improved. There is persistent mild atelectasis at the base of the aerated right lung. No left pleural effusion. Scant atelectasis at the retrocardiac left lung base. Cardiomegaly without vascular congestion. No pneumothorax. No tracheal deviation. No abnormal hilar fullness or gross mass on either side.   BONES: No lytic or blastic destructive bone lesion.   UPPER ABDOMEN: Grossly intact.       As above. Status post right thoracentesis. No pneumothorax.   MACRO: None   Signed by: Rainer Wyatt 2025 11:24 AM Dictation workstation:   [id number]    ECG 12 lead  Result Date: 2025  Atrial fibrillation Low voltage QRS Abnormal ECG When compared with ECG of 2024 11:12, No significant change was found See ED provider note for full interpretation and clinical correlation Confirmed by Lisa Fisher ([id number]) on 2025 2:02:32 PM    XR chest 1 view  Result Date: 2025  Interpreted By:  Denis Morales, STUDY: XR CHEST 1 VIEW  2025 12:50 pm   INDICATION: Signs/Symptoms:sob   COMPARISON: 2024   ACCESSION NUMBER(S): [id number]   ORDERING CLINICIAN: MARINA CASTANO   TECHNIQUE: A single AP portable radiograph of the  chest was obtained.   FINDINGS: Multiple cardiac monitoring leads are seen over the chest.  A moderate-sized right-sided pleural effusion is present. Right basilar airspace consolidation is seen and may represent atelectasis and/or pneumonia. No pneumothorax is identified. The cardiac silhouette is within normal limits for size.       Right-sided pleural effusion and airspace consolidation, as above. Clinical correlation and continued follow-up until clearing is recommended.   MACRO: None.   Signed by: Denis Morales 4/17/2025 1:33 PM Dictation workstation:   [id number]              Assessment & Plan  Biventricular congestive heart failure  89-year-old gentleman with known history of severe coronary artery disease status post PCI of LAD in 2014 and NSTEMI in 2022, history of chronic atrial fibrillation, hypertension, type 2 diabetes mellitus presents with increasing shortness of breath and leg edema, orthopnea and moderate-size increased right pleural effusion.  Patient has been taking his Lasix and metolazone weekly as prescribed as an outpatient.  His chest x-ray was consistent with right pleural effusion significantly larger than before and EKG shows atrial fibrillation with slow ventricular response and his BN peptide is elevated  89-year-old gentleman with known history of severe coronary artery disease status post PCI of LAD in 2014 and NSTEMI in 2022, history of chronic atrial fibrillation, hypertension, type 2 diabetes mellitus presents with increasing shortness of breath and leg edema, orthopnea and moderate-size increased right pleural effusion.  Patient has been taking his Lasix and metolazone weekly as prescribed as an outpatient.  His chest x-ray was consistent with right pleural effusion significantly larger than before and EKG shows atrial fibrillation with slow ventricular response and his BN peptide is elevated     1.  Acute on chronic diastolic heart failure with increasing right edema and  moderate-sized right pleural effusion and acute hypoxic respiratory failure  Chest x-ray consistent with large right pleural effusion, had thoracocentesis on April 18, 2025 and 1300 cc of pleural fluid was removed, pleural fluid cytology analysis is pending.  IV diuretics Lasix 40 mg IV twice daily, cardiology input appreciated.  Echocardiogram reviewed consistent with diastolic dysfunction.  Significant improvement, leg edema resolved diuresed good, will reduce Lasix to 40 mg po bid.  Serum potassium 3.4, patient receiving 20 mg of potassium daily.     2.  Chronic atrial fibrillation with slow ventricular response and blood pressures were rather low on admission therefore his diltiazem and metoprolol is being held, however today heart rate is around 100 to110 bpm and blood pressures have improved therefore  resumed metoprolol.  As well as seen by cardiology and patient  received IV digoxin other day.  Patient seen by Dr. Moeller today and patient is restarted on Cardizem CD to 240 mg daily.  Blood pressures have rather risen now and currently they are 153/110 mmHg.  Patient has been on Coumadin and INR is 2.3, on Coumadin 5 mg daily     3.  Diabetes mellitus type 2 stable, continue with current regime of metformin and sliding scale insulin if necessary, most recent A1c was 6.4.     4.Previous history of NSTEMI and LAD PCI.     Reviewed all labs and imaging studies discussed the plan of treatment with patient and his wife was present in the room, and she is updated with patient's condition     I spent 35 minutes in the professional and overall care of this patient.       Chon Lanza MD         [1] aspirin, 81 mg, oral, Daily  atorvastatin, 80 mg, oral, Daily  cholecalciferol, 50 mcg, oral, Daily  dilTIAZem CD, 240 mg, oral, Daily  [Held by provider] doxazosin, 6 mg, oral, Daily  insulin lispro, 0-5 Units, subcutaneous, TID AC  metFORMIN XR, 500 mg, oral, Daily  metoprolol tartrate, 50 mg, oral,  BID  pantoprazole, 40 mg, oral, Daily before breakfast  potassium chloride CR, 20 mEq, oral, Daily  warfarin, 5 mg, oral, Daily  [2]    [3] PRN medications: acetaminophen **OR** acetaminophen **OR** acetaminophen, alum-mag hydroxide-simeth, dextromethorphan-guaifenesin, ipratropium-albuteroL, melatonin, metoprolol, ondansetron ODT **OR** ondansetron, oxygen, polyethylene glycol

## 2025-04-21 NOTE — CARE PLAN
The patient's goals for the shift include maintain sp02 greater than 92%    The clinical goals for the shift include monitor so2    Over the shift, the patient did not make progress toward the following goals. Barriers to progression include . Recommendations to address these barriers include   Problem: Pain - Adult  Goal: Verbalizes/displays adequate comfort level or baseline comfort level  Outcome: Progressing     Problem: Safety - Adult  Goal: Free from fall injury  Outcome: Progressing     Problem: Nutrition  Goal: Nutrient intake appropriate for maintaining nutritional needs  Outcome: Progressing     Problem: Fall/Injury  Goal: Not fall by end of shift  Outcome: Progressing  Goal: Be free from injury by end of the shift  Outcome: Progressing  Goal: Verbalize understanding of personal risk factors for fall in the hospital  Outcome: Progressing  Goal: Verbalize understanding of risk factor reduction measures to prevent injury from fall in the home  Outcome: Progressing  Goal: Use assistive devices by end of the shift  Outcome: Progressing  Goal: Pace activities to prevent fatigue by end of the shift  Outcome: Progressing     Problem: Skin  Goal: Decreased wound size/increased tissue granulation at next dressing change  Outcome: Progressing  Goal: Participates in plan/prevention/treatment measures  Outcome: Progressing  Goal: Prevent/manage excess moisture  Outcome: Progressing  Goal: Prevent/minimize sheer/friction injuries  Outcome: Progressing  Goal: Promote/optimize nutrition  Outcome: Progressing  Goal: Promote skin healing  Outcome: Progressing   .

## 2025-04-21 NOTE — PROGRESS NOTES
Occupational Therapy                 Therapy Communication Note    Patient Name: Ariel Rodríguez  MRN: [id number]  Department: [facility] [id number]  Room: [address] [place] [address]  Today's Date: 4/21/2025     Discipline: Occupational Therapy    OT Missed Visit: Yes     Missed Visit Reason: Other (Comment) (Pt off floor for xray/CT at time of OT tx attempt.)    Missed Time: Attempt

## 2025-04-21 NOTE — PROGRESS NOTES
Ariel Rodríguez is a 89 y.o. male admitted for shortness of breath. Pharmacy has been consulted for warfarin dosing and monitoring for Atrial Fibrillation/Atrial Flutter with goal INR of 2.0-3.0.     Home regimen   MON TUE WED THR FRI SAT SUN   Dose 5  mg 5  mg 5  mg 5  mg 5  mg 5  mg 5  mg   Total weekly dose: 35 mg  Source: [facility]    Labs  INR: 2.4  Hgb/Hct/Plt  Lab Results   Component Value Date    HGB 11.8 (L) 04/21/2025    HGB 11.0 (L) 04/20/2025    HGB 11.1 (L) 04/19/2025    HGB 10.3 (L) 04/18/2025    HGB 10.4 (L) 04/17/2025        Lab Results   Component Value Date    HCT 35.5 (L) 04/21/2025    HCT 33.6 (L) 04/20/2025    HCT 33.2 (L) 04/19/2025    HCT 31.8 (L) 04/18/2025    HCT 31.4 (L) 04/17/2025        Platelets   Date Value Ref Range Status   04/21/2025 186 150 - 450 x10*3/uL Final   04/20/2025 160 150 - 450 x10*3/uL Final   04/19/2025 156 150 - 450 x10*3/uL Final   04/18/2025 164 150 - 450 x10*3/uL Final   04/17/2025 157 150 - 450 x10*3/uL Final      Warfarin Therapy   Current regimen: resuming home reigmen  Bridging: None needed  Interacting medications: no interacting medications    Dosing History During Current Admission  Date 4/17 4/18 4/19 4/20 4/21   INR 3.0 2.6 2.3 2.3 2.4   Dose  (mg) HOLD 3 mg 5 mg 5 mg 5 mg     Assessment and Plan  Patient's INR of 2.4 today is Therapeutic. Hemoglobin/hematocrit/platelet stable  Warfarin inpatient plan: Give 5 mg today per home regimen.  Monitor s/sx of bleeding including epistaxis, hematuria, unusual bruising, hemoptysis, hematochezia as well as s/sx of stroke including impaired speech, unilateral paralysis, blurry vision.  Pharmacy will continue to monitor the patient and adjust therapy as needed.    Thank you for the consult. Please do not hesitate to contact a pharmacist with any questions.    Gracie Ornelas, PharmD

## 2025-04-21 NOTE — PROGRESS NOTES
Subjective:  Seen earlier.   States he feels ok, down to 1 L NC. Still appears dyspneic on exam.   Wife at bedside.   They are both declining SNF at this time and prefer to go home upon discharge.     Vitals (Last 24 Hours):  Heart Rate:  []   Temp:  [36 °C (96.8 °F)-37.4 °C (99.3 °F)]   Resp:  [18-25]   BP: (108-154)/()   SpO2:  [90 %-99 %]     I have reviewed imaging reports and labs from this visit    PHYSICAL EXAM:  Constitutional: NAD, alert and cooperative  Eyes: no icterus  ENMT: mucous membranes moist, no lesions  Head/Neck: supple  Respiratory/Thorax: crackles bilat, mildly labored breathing, no cough, on 1 L NC  Cardiovascular: irregular/tachy, +murmur   Gastrointestinal: ND/S/NT  : no Strong, no SP/flank discomfort  Musculoskeletal: no joint swelling, ROM intact  Extremities: edema resolved   Neurological: non-focal  Skin: warm and dry  Psych: calm, stable mood     MEDS:  Scheduled meds  Scheduled Medications[1]    Continuous meds  Continuous Medications[2]    PRN meds  PRN Medications[3]    ASSESSMENT/PLAN:  89 yo man with h/o CAD s/p PCI, NSTEMI 8/2014, HFpEF, A-Fib on Coumadin, HTN, HLD, DMT2, right pleural effusion, presented with shortness of breath. CXR with right-sided pleural effusion and airspace consolidation, BMP elevated 466.     Acute hypoxic respiratory failure   R pleural effusion s/p right-sided thoracentesis 4/18, 1300 mL of straw-yellow fluid drained - exudate per light's criteria   Acute on chronic HFpEF   Small/mod pericardial effusion without evidence of tamponade   -improved volume status, IV lasix stopped with plans to transition to PO, of note - he is on metolazone 2.5 mg once a week in addition to Lasix, contraction alkalosis noted, cardiology following, TTE EF 60% 4/18  -currently low suspicion for PNA, pleural fluid cytology pending   -CT chest from today noted, effusion small per IR review and repeat thora deferred, encourage incentive spirometry   -pt follow with  pulm as outpt   -continue to wean O2 as able   -pericardial effusion monitoring as per cardio    A-Fib RVR   -s/p Dig on 4/20  -continue metoprolol and cardizem; continue warfarin INR 2.4     HTN  -holding chronic BP meds (ramipril, cardura) for now     CAD s/p PCI  -continue ASA, statin, BB    DMT2 A1c 7.5  -continue metformin, corrective scale Insulin     VTE / GI prophylaxis   -on warfarin, PPI, bowel regimen in place     Discharge planning  -PT/OT: mod, pt/wife declining SNF and HC as of today     Discussed with Dr. Lanza and the interdisciplinary team     Loni Galo, APRN-CNP           [1] aspirin, 81 mg, oral, Daily  atorvastatin, 80 mg, oral, Daily  cholecalciferol, 50 mcg, oral, Daily  dilTIAZem CD, 240 mg, oral, Daily  [Held by provider] doxazosin, 6 mg, oral, Daily  insulin lispro, 0-5 Units, subcutaneous, TID AC  metFORMIN XR, 500 mg, oral, Daily  metoprolol tartrate, 50 mg, oral, BID  pantoprazole, 40 mg, oral, Daily before breakfast  potassium chloride CR, 20 mEq, oral, Daily  warfarin, 5 mg, oral, Daily     [2]    [3] PRN medications: acetaminophen **OR** acetaminophen **OR** acetaminophen, alum-mag hydroxide-simeth, dextromethorphan-guaifenesin, ipratropium-albuteroL, melatonin, metoprolol, ondansetron ODT **OR** ondansetron, oxygen, polyethylene glycol

## 2025-04-21 NOTE — PROGRESS NOTES
04/21/25 1301   Discharge Planning   Expected Discharge Disposition Home     Met with patient and wife at bedside. Discussed discharge planning- patient should qualify for snf. Patient and family was provided with SNF list. Wife states that she has had family at SNFs previously and was not impressed with care- seen for 20-30 mins a day by pt ot then sat in a chair the rest of the day. Patient and wife both feel comfortable with dc home, declining snf and hhc. SW will follow.    Addendum: Met with patient and wife at bedside. Discussed concerns presented by PT and providers. Wife stated that she is aware of patient's mobility abilities and concerns in regards to safety. Wife stated that she plans to be with patient whenever he is walking. They are still declining SNF. However, now patient and wife are agreeable to [facility]. SW will follow.

## 2025-04-22 LAB
ANION GAP SERPL CALC-SCNC: 11 MMOL/L (ref 10–20)
BUN SERPL-MCNC: 43 MG/DL (ref 6–23)
CALCIUM SERPL-MCNC: 8.9 MG/DL (ref 8.6–10.3)
CHLORIDE SERPL-SCNC: 89 MMOL/L (ref 98–107)
CO2 SERPL-SCNC: 39 MMOL/L (ref 21–32)
CREAT SERPL-MCNC: 1.13 MG/DL (ref 0.5–1.3)
EGFRCR SERPLBLD CKD-EPI 2021: 62 ML/MIN/1.73M*2
ERYTHROCYTE [DISTWIDTH] IN BLOOD BY AUTOMATED COUNT: 14.6 % (ref 11.5–14.5)
GLUCOSE BLD MANUAL STRIP-MCNC: 148 MG/DL (ref 74–99)
GLUCOSE BLD MANUAL STRIP-MCNC: 152 MG/DL (ref 74–99)
GLUCOSE BLD MANUAL STRIP-MCNC: 217 MG/DL (ref 74–99)
GLUCOSE BLD MANUAL STRIP-MCNC: 255 MG/DL (ref 74–99)
GLUCOSE SERPL-MCNC: 129 MG/DL (ref 74–99)
HCT VFR BLD AUTO: 33.9 % (ref 41–52)
HGB BLD-MCNC: 11.2 G/DL (ref 13.5–17.5)
INR PPP: 2.8 (ref 0.9–1.1)
MAGNESIUM SERPL-MCNC: 1.95 MG/DL (ref 1.6–2.4)
MCH RBC QN AUTO: 29 PG (ref 26–34)
MCHC RBC AUTO-ENTMCNC: 33 G/DL (ref 32–36)
MCV RBC AUTO: 88 FL (ref 80–100)
NRBC BLD-RTO: 0 /100 WBCS (ref 0–0)
PLATELET # BLD AUTO: 198 X10*3/UL (ref 150–450)
POTASSIUM SERPL-SCNC: 3.8 MMOL/L (ref 3.5–5.3)
PROTHROMBIN TIME: 31.5 SECONDS (ref 9.8–12.4)
RBC # BLD AUTO: 3.86 X10*6/UL (ref 4.5–5.9)
SODIUM SERPL-SCNC: 135 MMOL/L (ref 136–145)
WBC # BLD AUTO: 7.9 X10*3/UL (ref 4.4–11.3)

## 2025-04-22 PROCEDURE — 99232 SBSQ HOSP IP/OBS MODERATE 35: CPT | Performed by: NURSE PRACTITIONER

## 2025-04-22 PROCEDURE — 2500000002 HC RX 250 W HCPCS SELF ADMINISTERED DRUGS (ALT 637 FOR MEDICARE OP, ALT 636 FOR OP/ED)

## 2025-04-22 PROCEDURE — 2500000002 HC RX 250 W HCPCS SELF ADMINISTERED DRUGS (ALT 637 FOR MEDICARE OP, ALT 636 FOR OP/ED): Performed by: NURSE PRACTITIONER

## 2025-04-22 PROCEDURE — 85027 COMPLETE CBC AUTOMATED: CPT | Performed by: NURSE PRACTITIONER

## 2025-04-22 PROCEDURE — 97116 GAIT TRAINING THERAPY: CPT | Mod: GP,CQ

## 2025-04-22 PROCEDURE — 97530 THERAPEUTIC ACTIVITIES: CPT | Mod: GO

## 2025-04-22 PROCEDURE — 99232 SBSQ HOSP IP/OBS MODERATE 35: CPT | Performed by: INTERNAL MEDICINE

## 2025-04-22 PROCEDURE — 99232 SBSQ HOSP IP/OBS MODERATE 35: CPT

## 2025-04-22 PROCEDURE — 83735 ASSAY OF MAGNESIUM: CPT | Performed by: NURSE PRACTITIONER

## 2025-04-22 PROCEDURE — 2500000001 HC RX 250 WO HCPCS SELF ADMINISTERED DRUGS (ALT 637 FOR MEDICARE OP): Performed by: NURSE PRACTITIONER

## 2025-04-22 PROCEDURE — 2500000001 HC RX 250 WO HCPCS SELF ADMINISTERED DRUGS (ALT 637 FOR MEDICARE OP): Performed by: INTERNAL MEDICINE

## 2025-04-22 PROCEDURE — 2060000001 HC INTERMEDIATE ICU ROOM DAILY

## 2025-04-22 PROCEDURE — 85610 PROTHROMBIN TIME: CPT | Performed by: NURSE PRACTITIONER

## 2025-04-22 PROCEDURE — 82947 ASSAY GLUCOSE BLOOD QUANT: CPT

## 2025-04-22 PROCEDURE — 36415 COLL VENOUS BLD VENIPUNCTURE: CPT | Performed by: NURSE PRACTITIONER

## 2025-04-22 PROCEDURE — 97535 SELF CARE MNGMENT TRAINING: CPT | Mod: GO

## 2025-04-22 PROCEDURE — 97530 THERAPEUTIC ACTIVITIES: CPT | Mod: GP,CQ

## 2025-04-22 PROCEDURE — 80048 BASIC METABOLIC PNL TOTAL CA: CPT | Performed by: NURSE PRACTITIONER

## 2025-04-22 RX ORDER — FUROSEMIDE 40 MG/1
40 TABLET ORAL DAILY
Status: DISCONTINUED | OUTPATIENT
Start: 2025-04-22 | End: 2025-04-23 | Stop reason: HOSPADM

## 2025-04-22 RX ADMIN — INSULIN LISPRO 2 UNITS: 100 INJECTION, SOLUTION INTRAVENOUS; SUBCUTANEOUS at 08:40

## 2025-04-22 RX ADMIN — FUROSEMIDE 40 MG: 40 TABLET ORAL at 17:20

## 2025-04-22 RX ADMIN — WARFARIN SODIUM 5 MG: 5 TABLET ORAL at 17:20

## 2025-04-22 RX ADMIN — Medication 50 MCG: at 06:03

## 2025-04-22 RX ADMIN — PANTOPRAZOLE SODIUM 40 MG: 40 TABLET, DELAYED RELEASE ORAL at 06:02

## 2025-04-22 RX ADMIN — ASPIRIN 81 MG: 81 TABLET, COATED ORAL at 08:39

## 2025-04-22 RX ADMIN — INSULIN LISPRO 1 UNITS: 100 INJECTION, SOLUTION INTRAVENOUS; SUBCUTANEOUS at 11:53

## 2025-04-22 RX ADMIN — DILTIAZEM HYDROCHLORIDE 240 MG: 120 CAPSULE, COATED, EXTENDED RELEASE ORAL at 08:39

## 2025-04-22 RX ADMIN — POTASSIUM CHLORIDE 20 MEQ: 1500 TABLET, EXTENDED RELEASE ORAL at 08:39

## 2025-04-22 RX ADMIN — METOPROLOL TARTRATE 50 MG: 50 TABLET, FILM COATED ORAL at 08:39

## 2025-04-22 RX ADMIN — METFORMIN ER 500 MG 500 MG: 500 TABLET ORAL at 08:39

## 2025-04-22 RX ADMIN — ATORVASTATIN CALCIUM 80 MG: 80 TABLET, FILM COATED ORAL at 08:39

## 2025-04-22 RX ADMIN — METOPROLOL TARTRATE 50 MG: 50 TABLET, FILM COATED ORAL at 20:34

## 2025-04-22 ASSESSMENT — COGNITIVE AND FUNCTIONAL STATUS - GENERAL
MOBILITY SCORE: 18
TURNING FROM BACK TO SIDE WHILE IN FLAT BAD: A LITTLE
DRESSING REGULAR UPPER BODY CLOTHING: A LITTLE
DAILY ACTIVITIY SCORE: 19
HELP NEEDED FOR BATHING: A LOT
PERSONAL GROOMING: A LITTLE
CLIMB 3 TO 5 STEPS WITH RAILING: TOTAL
HELP NEEDED FOR BATHING: A LITTLE
TURNING FROM BACK TO SIDE WHILE IN FLAT BAD: A LITTLE
TOILETING: A LITTLE
PERSONAL GROOMING: A LITTLE
DAILY ACTIVITIY SCORE: 17
STANDING UP FROM CHAIR USING ARMS: A LITTLE
MOVING FROM LYING ON BACK TO SITTING ON SIDE OF FLAT BED WITH BEDRAILS: A LITTLE
WALKING IN HOSPITAL ROOM: A LITTLE
MOVING TO AND FROM BED TO CHAIR: A LITTLE
MOBILITY SCORE: 16
STANDING UP FROM CHAIR USING ARMS: A LITTLE
TOILETING: A LITTLE
DRESSING REGULAR LOWER BODY CLOTHING: A LITTLE
MOVING TO AND FROM BED TO CHAIR: A LITTLE
MOVING FROM LYING ON BACK TO SITTING ON SIDE OF FLAT BED WITH BEDRAILS: A LITTLE
DRESSING REGULAR LOWER BODY CLOTHING: A LOT
DRESSING REGULAR UPPER BODY CLOTHING: A LITTLE
CLIMB 3 TO 5 STEPS WITH RAILING: A LITTLE
WALKING IN HOSPITAL ROOM: A LITTLE

## 2025-04-22 ASSESSMENT — PAIN - FUNCTIONAL ASSESSMENT
PAIN_FUNCTIONAL_ASSESSMENT: 0-10

## 2025-04-22 ASSESSMENT — PAIN SCALES - GENERAL
PAINLEVEL_OUTOF10: 0 - NO PAIN

## 2025-04-22 ASSESSMENT — ACTIVITIES OF DAILY LIVING (ADL): HOME_MANAGEMENT_TIME_ENTRY: 20

## 2025-04-22 NOTE — PROGRESS NOTES
Ariel Rodríguez is a 89 y.o. male admitted for shortness of breath. Pharmacy has been consulted for warfarin dosing and monitoring for Atrial Fibrillation/Atrial Flutter with goal INR of 2.0-3.0.     Home regimen   MON TUE WED THR FRI SAT SUN   Dose 5  mg 5  mg 5  mg 5  mg 5  mg 5  mg 5  mg   Total weekly dose: 35 mg  Source: [facility]    Labs  INR: 2.8  Hgb/Hct/Plt  Lab Results   Component Value Date    HGB 11.2 (L) 04/22/2025    HGB 11.8 (L) 04/21/2025    HGB 11.0 (L) 04/20/2025    HGB 11.1 (L) 04/19/2025    HGB 10.3 (L) 04/18/2025        Lab Results   Component Value Date    HCT 33.9 (L) 04/22/2025    HCT 35.5 (L) 04/21/2025    HCT 33.6 (L) 04/20/2025    HCT 33.2 (L) 04/19/2025    HCT 31.8 (L) 04/18/2025        Platelets   Date Value Ref Range Status   04/22/2025 198 150 - 450 x10*3/uL Final   04/21/2025 186 150 - 450 x10*3/uL Final   04/20/2025 160 150 - 450 x10*3/uL Final   04/19/2025 156 150 - 450 x10*3/uL Final   04/18/2025 164 150 - 450 x10*3/uL Final      Warfarin Therapy   Current regimen: resuming home reigmen  Bridging: None needed  Interacting medications: no interacting medications    Dosing History During Current Admission  Date 4/17 4/18 4/19 4/20 4/21 4/22   INR 3.0 2.6 2.3 2.3 2.4 2.8   Dose  (mg) HOLD 3 mg 5 mg 5 mg 5 mg 5 mg     Assessment and Plan  Patient's INR of 2.8 today is Therapeutic. Hemoglobin/hematocrit/platelet stable  Warfarin inpatient plan: Give 5 mg today per home regimen.  Monitor s/sx of bleeding including epistaxis, hematuria, unusual bruising, hemoptysis, hematochezia as well as s/sx of stroke including impaired speech, unilateral paralysis, blurry vision.  Pharmacy will continue to monitor the patient and adjust therapy as needed.    Thank you for the consult. Please do not hesitate to contact a pharmacist with any questions.    Gracie Ornelas, PharmD

## 2025-04-22 NOTE — PROGRESS NOTES
Physical Therapy    Physical Therapy Treatment    Patient Name: Ariel Rodríguez  MRN: [id number]  Department: [facility] [place] [address]  Room: [address] [place] [address]  Today's Date: 4/22/2025  Time Calculation  Start Time: 0924  Stop Time: 0947  Time Calculation (min): 23 min         Assessment/Plan   PT Assessment  Rehab Prognosis: Good  Barriers to Discharge Home: Caregiver assistance, Physical needs  Caregiver Assistance: Caregiver assistance needed per identified barriers - however, level of patient's required assistance exceeds assistance available at home  Physical Needs: Stair navigation into home limited by function/safety, In-home setup navigation limited by function/safety, Ambulating household distances limited by function/safety, Intermittent mobility assistance needed, Intermittent ADL assistance needed, High falls risk due to function or environment  End of Session Communication: Bedside nurse  Assessment Comment: Pt continues to require physical assist to perform transfers. Attempted to perform without physical assist this date and unable to. Pt displays poor WW management and requires VC for safety.  End of Session Patient Position: Up in chair, Alarm on  PT Plan  Inpatient/Swing Bed or Outpatient: Inpatient  PT Plan  Treatment/Interventions: Transfer training, Gait training  PT Plan: Ongoing PT  PT Frequency: 3 times per week  PT Discharge Recommendations: Moderate intensity level of continued care  PT Recommended Transfer Status: Assist x1  PT - OK to Discharge: Yes (per POC)      General Visit Information:   PT  Visit  PT Received On: 04/22/25  General  Reason for Referral: 89 yo presenting with worsening SOB and LE edema. Acute on chronic diastolic heart failure with increasing right edema and moderate-sized right pleural effusion and acute hypoxic respiratory failure. s/p Thoracentesis 4/18 with 1300cc fluid removal  Referred By: DEBRA Juarez-CNP  Past Medical History Relevant to Rehab: Medical History[1]    Prior to  Session Communication: Bedside nurse  Patient Position Received: Alarm on, Up in chair    Subjective   Precautions:  Precautions  Hearing/Visual Limitations: hard of hearing  Medical Precautions: Fall precautions     Date/Time Vitals Session Patient Position Pulse Resp SpO2 BP MAP (mmHg)    04/22/25 0924 During PT  --  98  --  87 %  --  --           Vital Signs Comment: Pt ranging 87-95% thorghout tx session on 1L NC. Pt required VC for PLB, RN notified.     Objective   Pain:  Pain Assessment  Pain Assessment: 0-10  0-10 (Numeric) Pain Score: 0 - No pain  Cognition:  Cognition  Orientation Level: Disoriented to time       Postural Control:  Static Sitting Balance  Static Sitting-Balance Support: Feet supported, No upper extremity supported  Static Sitting-Level of Assistance: Distant supervision  Static Standing Balance  Static Standing-Balance Support: Bilateral upper extremity supported  Static Standing-Level of Assistance: Close supervision  Dynamic Standing Balance  Dynamic Standing-Balance Support: Left upper extremity supported  Dynamic Standing-Level of Assistance: Contact guard  Dynamic Standing-Balance: Lateral lean, Forward lean, Reaching for objects, Reaching across midline  Dynamic Standing-Comments: Pt requires contact guard for safety with all dynamic balance exercises. Pt demonstrates poor WW management and safety awareness, requiring max VC throughout activity for safety.       Treatments:  Therapeutic Exercise  Therapeutic Exercise Performed: Yes  Therapeutic Exercise Activity 1: Pt performed seated x15 reps AP, LAQ, and alternating marches. Pt required VC for correct technique and sequencing. Full ROM noted and no physical assist required.    Therapeutic Activity  Therapeutic Activity Performed: Yes  Therapeutic Activity 1: Pt performed item retrieval around room to practice standing dynamic balance activities, WW management, and home safety    Bed Mobility  Bed Mobility: No    Ambulation/Gait  Training  Ambulation/Gait Training Performed: Yes  Ambulation/Gait Training 1  Surface 1: Level tile  Device 1: Rolling walker  Assistance 1: Contact guard  Quality of Gait 1: Diminished heel strike, Decreased step length, Forward flexed posture  Comments/Distance (ft) 1: 40, 10 x 3 (Pt demonstrates improvements in balance although WW management and safety appears poor. Pt requires VC for obstacle avoidance and for upright posture.)  Transfers  Transfer: Yes  Transfer 1  Transfer From 1: Chair with arms to  Transfer to 1: Stand  Technique 1: Sit to stand, Stand to sit  Transfer Device 1: Walker  Transfer Level of Assistance 1: Minimum assistance  Trials/Comments 1: x3 attempts to perform without physical assist, pt unable to perform without physical assist. Pt required Jj for trunk elevation and eccentric control.    Outcome Measures:  [facility] Basic Mobility  Turning from your back to your side while in a flat bed without using bedrails: A little  Moving from lying on your back to sitting on the side of a flat bed without using bedrails: A little  Moving to and from bed to chair (including a wheelchair): A little  Standing up from a chair using your arms (e.g. wheelchair or bedside chair): A little  To walk in hospital room: A little  Climbing 3-5 steps with railing: Total  Basic Mobility - Total Score: 16    Education Documentation  Precautions, taught by Guerline Rapjut PTA at 4/22/2025 10:52 AM.  Learner: Patient  Readiness: Acceptance  Method: Explanation  Response: Verbalizes Understanding    Body Mechanics, taught by Guerline Rajput PTA at 4/22/2025 10:52 AM.  Learner: Patient  Readiness: Acceptance  Method: Explanation  Response: Verbalizes Understanding    Mobility Training, taught by Guerline Rajput PTA at 4/22/2025 10:52 AM.  Learner: Patient  Readiness: Acceptance  Method: Explanation  Response: Verbalizes Understanding    Education Comments  No comments found.           Encounter Problems        Encounter Problems (Active)       Balance       complete all mobility with normal balance while dual tasking, negotiating in a dynamic environment, carrying items, etc., with proactive and reactive static and dynamic standing and sitting tasks, with mod I and LRAD, >15 minutes.   (Progressing)       Start:  04/18/25    Expected End:  05/02/25               Mobility       STG - Patient will ambulate 150 ft mod I using LRAD with stable vitals.  (Not Progressing)       Start:  04/18/25    Expected End:  05/02/25            STG - Patient will ascend and descend four to six stairs (Not Progressing)       Start:  04/18/25               PT Transfers       STG - Patient will perform bed mobility independently. (Progressing)       Start:  04/18/25    Expected End:  05/02/25            STG - Patient will transfer sit to and from stand mod I with LRAD.  (Not Progressing)       Start:  04/18/25    Expected End:  05/02/25               Pain - Adult                   [1]   Past Medical History:  Diagnosis Date    Acute myocardial infarction, unspecified 11/09/2022    Acute myocardial infarction    Acute upper respiratory infection, unspecified 04/10/2018    Viral URI    Atherosclerotic heart disease of native coronary artery with unspecified angina pectoris 11/21/2022    Athscl heart disease of native cor art w unsp ang pctrs    Benign neoplasm of colon, unspecified 08/26/2016    Colonic adenoma    Encounter for screening for malignant neoplasm of colon 07/24/2014    Colon cancer screening    Hyperglycemia 01/26/2023    Hyponatremia 01/26/2023    Obesity, unspecified 10/28/2020    Class 1 obesity with body mass index (BMI) of 32.0 to 32.9 in adult    Obesity, unspecified 11/09/2022    Obesity, Class I, BMI 30.0-34.9 (see actual BMI)    Olecranon bursitis, unspecified elbow 02/19/2020    Olecranon bursitis    Osteoarthritis of knee, unspecified 10/03/2017    Osteoarthrosis of knee    Other fecal abnormalities 08/26/2016     Guaiac positive stools    Personal history of other diseases of the circulatory system 08/17/2016    History of angina pectoris    Personal history of other diseases of the musculoskeletal system and connective tissue     History of arthritis    Personal history of other diseases of the nervous system and sense organs     History of cataract    Personal history of other diseases of the respiratory system 04/10/2018    History of acute bronchitis    Personal history of other drug therapy 10/01/2019    History of influenza vaccination    Personal history of other endocrine, nutritional and metabolic disease 12/26/2018    History of vitamin D deficiency    Personal history of other endocrine, nutritional and metabolic disease 02/26/2015    History of hypokalemia    Personal history of other specified conditions     History of heartburn    Personal history of thrombophlebitis 12/29/2015    History of deep vein thrombophlebitis of lower extremity    Pure hypercholesterolemia, unspecified 01/02/2019    Pure hypercholesterolemia, unspecified    Supratherapeutic INR 06/11/2024    Unspecified atrial fibrillation (Multi) 01/03/2023    Atrial fibrillation    Unspecified injury of head, sequela 11/29/2019    CHI (closed head injury), sequela

## 2025-04-22 NOTE — PROGRESS NOTES
Subjective Data:  States breathing feels okay   Remains on 1.5 L NC     Rates are now controlled on telemetry     Overnight Events:    None reported     Objective Data:  Last Recorded Vitals:  Vitals:    25 0300 25 0744 25 0924 25 1126   BP: 131/76 131/78  127/69   BP Location: Left arm      Patient Position: Lying      Pulse:  (!) 122 98 66   Resp:    Temp: 37.1 °C (98.8 °F) 36.9 °C (98.5 °F)  37 °C (98.6 °F)   TempSrc: Temporal Temporal  Temporal   SpO2: (!) 84% 91% (!) 87% 96%   Weight:       Height:         Medical Gas Therapy: Supplemental oxygen  Medical Gas Delivery Method: Nasal cannula  Weight  Av.1 kg (170 lb)  Min: 77.1 kg (170 lb)  Max: 77.1 kg (170 lb)    LABS:  CMP:  Results from last 7 days   Lab Units 25  0531 25  0750 25  0550 25  0548 25  0611 25  1343 25  1248   SODIUM mmol/L 135* 134* 135* 135* 136  --  132*   POTASSIUM mmol/L 3.8 3.4* 3.7 3.4* 3.6  --  4.3   CHLORIDE mmol/L 89* 88* 90* 91* 94*  --  99   CO2 mmol/L 39* 40* 36* 36* 35*  --  24   ANION GAP mmol/L 11 9* 13 11 11  --  13   BUN mg/dL 43* 33* 27* 24* 21  --  26*   CREATININE mg/dL 1.13 1.00 0.96 0.96 0.96  --  1.10   EGFR mL/min/1.73m*2 62 71 75 75 75  --  64   MAGNESIUM mg/dL 1.95 1.97 1.85 1.84 2.05 1.87 1.86   ALBUMIN g/dL  --   --   --   --   --   --  3.6   ALT U/L  --   --   --   --   --   --  14   AST U/L  --   --   --   --   --   --  22   BILIRUBIN TOTAL mg/dL  --   --   --   --   --   --  0.7     CBC:  Results from last 7 days   Lab Units 25  0531 25  0750 25  0550 25  0548 25  0611 25  1248   WBC AUTO x10*3/uL 7.9 8.1 9.2 10.4 6.5 6.5   HEMOGLOBIN g/dL 11.2* 11.8* 11.0* 11.1* 10.3* 10.4*   HEMATOCRIT % 33.9* 35.5* 33.6* 33.2* 31.8* 31.4*   PLATELETS AUTO x10*3/uL 198 186 160 156 164 157   MCV fL 88 89 88 88 90 89     COAG:   Results from last 7 days   Lab Units 25  0531 25  0750 25  0550  "04/19/25  0548 04/18/25  0611 04/17/25  1248   INR  2.8* 2.4* 2.3* 2.3* 2.6* 3.0*     ABO: No results found for: \"ABO\"  HEME/ENDO:     CARDIAC:   Results from last 7 days   Lab Units 04/18/25  0611 04/17/25  1343 04/17/25  1248   LD U/L 153  --   --    TROPHS ng/L  --  5 5   BNP pg/mL  --   --  466*             Last I/O:    Intake/Output Summary (Last 24 hours) at 4/22/2025 1324  Last data filed at 4/22/2025 0800  Gross per 24 hour   Intake 240 ml   Output 850 ml   Net -610 ml     Net IO Since Admission: -5,950 mL [04/22/25 1324]         Inpatient Medications:  Scheduled Medications[1]  PRN Medications[2]  Continuous Medications[3]      Physical Exam:  GENERAL: pleasant male, alert and oriented, NAD  SKIN: warm, dry  NECK: No  JVD  CARDIAC: Irregularly irregular + murmur  PULMONARY: Diminished breath sounds; supp oxygen in place  ABDOMEN: soft, nondistended  EXTREMITIES: No noted lower extremity edema  NEURO: Alert and oriented x 3.  Grossly normal.  Moves all 4 extremities.     Echocardiogram 4/18/2025:  CONCLUSIONS:   1. Left ventricular ejection fraction is normal, calculated by Rodriguez's biplane at 60%.   2. The left atrial size is severely dilated.   3. The right atrium is severely dilated.   4. Small to moderate pericardial effusion.   5. Absent A-wave on MV spectral Doppler tracing, consistent with atrial fibrillation.   6. Mildly elevated right ventricular systolic pressure.   7. Compared with study dated 6/28/2023, pericardial effusion has increased.     Assessment/Plan  Ariel Rodríguez is a 89 y.o. male with past medical history significant for Severe coronary artery disease s/p prior PCI to proximal and distal LAD and  NSTEMI 8/2014 (medical management), HFpEF, permanent atrial fibrillation on Coumadin, hypertension, Hyperlipidemia, Diabetes mellitus, Right pleural effusion.  Presented with shortness of breath.  Cardiology is consulted for CHF.     Home CV meds: Aspirin 81 mg daily Atorvastatin 80 mg " daily Diltiazem  mg daily Metoprolol tartrate 25 mg twice daily Coumadin 5 mg daily Doxazosin 6 mg daily Ramipril 5 mg daily Furosemide 40 mg daily Metolazone 2.5 mg weekly     I reviewed telemetry, AF rates 60-70s     #Acute hypoxic respiratory failure- Currently on 1.5 L NC (on no oxygen at home)     #Chronic right pleural effusion s/p thoracentesis with 1300 cc fluid removal 4/18/2025.  Consistent with exudate by lights criteria. CT chest 4/21/2025 with moderate to large effusion (reportedly small per IR review and repeat thora was deferred)  -Defer management to primary tem     #Acute on chronic HFpEF-      # Severe CAD s/p prior PCI to proximal and distal LAD and  NSTEMI 8/2014 (medical management)    #Permanent atrial fibrillation on Coumadin- Now rate controlled on Diltiazem  and metoprolol tartrate 50 mg BID.     #Hypertension  BP acceptable     #Hyperlipidemia     Recommendations  Continue metoprolol and diltiazem   Resume oral furosemide 40 mg daily and weekly Metolazone   Consider pulmonary consultation for exudative effusion    Cardiology will sign off  Please call with questions     Follow up with Dr. Ruggiero in at discharge     Code Status:  DNR and No Intubation        Jermaine Neville, APRN-CNP       [1]   Scheduled medications   Medication Dose Route Frequency    aspirin  81 mg oral Daily    atorvastatin  80 mg oral Daily    cholecalciferol  50 mcg oral Daily    dilTIAZem CD  240 mg oral Daily    [Held by provider] doxazosin  6 mg oral Daily    insulin lispro  0-5 Units subcutaneous TID AC    metFORMIN XR  500 mg oral Daily    metoprolol tartrate  50 mg oral BID    pantoprazole  40 mg oral Daily before breakfast    potassium chloride CR  20 mEq oral Daily    warfarin  5 mg oral Daily   [2]   PRN medications   Medication    acetaminophen    Or    acetaminophen    Or    acetaminophen    alum-mag hydroxide-simeth    dextromethorphan-guaifenesin    ipratropium-albuteroL    melatonin     metoprolol    ondansetron ODT    Or    ondansetron    oxygen    polyethylene glycol   [3]   Continuous Medications   Medication Dose Last Rate

## 2025-04-22 NOTE — PROGRESS NOTES
Ariel Rodríguez is a 89 y.o. male on day 5 of admission presenting with Biventricular congestive heart failure.    Subjective   Patient seen and examined.  89-year-old gentleman with history of chronic diastolic heart failure and chronic atrial fibrillation with rapid ventricular response, chronic right pleural effusion was admitted with hypoxic respiratory failure and atrial fibrillation with rapid ventricular response and hypotension.  He underwent right thoracocentesis and 1300 cc of pleural fluid was removed, pleural fluid analysis is suggestive of an exudate however pH is 7.7 and no significant white cells were seen.  And there is no evidence of any pneumonic infiltrate.  He was also diuresed with IV Lasix.  On admission he was significantly hypotensive therefore his beta-blocker and Cardizem were held and subsequently both beta-blockers and Cardizem have been resumed and his heart rate has improved and he remains in atrial fibrillation with controlled ventricular rate now.  His breathing seems to have improved significantly and leg edema has completely resolved and blood pressure normalized.  He is ready for discharge home today to be followed by his pulmonologist Dr. Tolentino follow-up with cardiology Dr. June, follow-up with PCP in 1 week.       Objective     Physical Exam  GENERAL:  Alert, mild distress, cooperative  SKIN:  Skin color, texture, turgor normal. No rashes or lesions.  OROPHARYNX:  Lips, mucosa, and tongue are normal.Teeth and gums, normal. Oropharynx normal.  NECK:  No jugulovenous distention, No carotid bruits, Carotid pulse normal contour, Supple  LUNGS: Improved air exchange, status post right thoracocentesis and 1300 cc of pleural fluid was removed on 4/18/25  CARDIAC: Atrial fibrillation with controlled rate now, normal S1 and S2; no rubs,  or gallops, 2/6 systolic ejection murmur left sternal border, CHF improving  ABDOMEN:  Abdomen soft, non-tender, BS normal, No masses or  "organomegaly  EXTREMETIES:  Extremities normal, no deformities, trace edema,no clubbing   NEURO:  Alert, oriented X 3, Non-focal. PULSES:  2+ radial, 2+ carotid    Last Recorded Vitals  Blood pressure 117/69, pulse 80, temperature 36.8 °C (98.3 °F), temperature source Temporal, resp. rate 16, height 1.6 m (5' 3\"), weight 77.1 kg (170 lb), SpO2 95%.  Intake/Output last 3 Shifts:  I/O last 3 completed shifts:  In: - (0 mL/kg)   Out: 1000 (13 mL/kg) [Urine:1000 (0.4 mL/kg/hr)]  Weight: 77.1 kg     Relevant Results  Scheduled medications  Scheduled Medications[1]  Continuous medications  Continuous Medications[2]  PRN medications  PRN Medications[3]   Results for orders placed or performed during the hospital encounter of 04/17/25 (from the past 96 hours)   POCT GLUCOSE   Result Value Ref Range    POCT Glucose 161 (H) 74 - 99 mg/dL   Basic metabolic panel   Result Value Ref Range    Glucose 114 (H) 74 - 99 mg/dL    Sodium 135 (L) 136 - 145 mmol/L    Potassium 3.4 (L) 3.5 - 5.3 mmol/L    Chloride 91 (L) 98 - 107 mmol/L    Bicarbonate 36 (H) 21 - 32 mmol/L    Anion Gap 11 10 - 20 mmol/L    Urea Nitrogen 24 (H) 6 - 23 mg/dL    Creatinine 0.96 0.50 - 1.30 mg/dL    eGFR 75 >60 mL/min/1.73m*2    Calcium 8.8 8.6 - 10.3 mg/dL   CBC   Result Value Ref Range    WBC 10.4 4.4 - 11.3 x10*3/uL    nRBC 0.0 0.0 - 0.0 /100 WBCs    RBC 3.76 (L) 4.50 - 5.90 x10*6/uL    Hemoglobin 11.1 (L) 13.5 - 17.5 g/dL    Hematocrit 33.2 (L) 41.0 - 52.0 %    MCV 88 80 - 100 fL    MCH 29.5 26.0 - 34.0 pg    MCHC 33.4 32.0 - 36.0 g/dL    RDW 15.4 (H) 11.5 - 14.5 %    Platelets 156 150 - 450 x10*3/uL   Magnesium   Result Value Ref Range    Magnesium 1.84 1.60 - 2.40 mg/dL   Protime-INR   Result Value Ref Range    Protime 26.0 (H) 9.8 - 12.4 seconds    INR 2.3 (H) 0.9 - 1.1   POCT GLUCOSE   Result Value Ref Range    POCT Glucose 112 (H) 74 - 99 mg/dL   POCT GLUCOSE   Result Value Ref Range    POCT Glucose 197 (H) 74 - 99 mg/dL   POCT GLUCOSE   Result Value " Ref Range    POCT Glucose 142 (H) 74 - 99 mg/dL   Electrocardiogram, 12-lead PRN ACS symptoms   Result Value Ref Range    Ventricular Rate 130 BPM    Atrial Rate 119 BPM    QRS Duration 84 ms    QT Interval 280 ms    QTC Calculation(Bazett) 412 ms    R Axis 24 degrees    T Axis 5 degrees    QRS Count 21 beats    Q Onset 220 ms    T Offset 360 ms    QTC Fredericia 362 ms   POCT GLUCOSE   Result Value Ref Range    POCT Glucose 134 (H) 74 - 99 mg/dL   Basic metabolic panel   Result Value Ref Range    Glucose 117 (H) 74 - 99 mg/dL    Sodium 135 (L) 136 - 145 mmol/L    Potassium 3.7 3.5 - 5.3 mmol/L    Chloride 90 (L) 98 - 107 mmol/L    Bicarbonate 36 (H) 21 - 32 mmol/L    Anion Gap 13 10 - 20 mmol/L    Urea Nitrogen 27 (H) 6 - 23 mg/dL    Creatinine 0.96 0.50 - 1.30 mg/dL    eGFR 75 >60 mL/min/1.73m*2    Calcium 8.7 8.6 - 10.3 mg/dL   CBC   Result Value Ref Range    WBC 9.2 4.4 - 11.3 x10*3/uL    nRBC 0.0 0.0 - 0.0 /100 WBCs    RBC 3.80 (L) 4.50 - 5.90 x10*6/uL    Hemoglobin 11.0 (L) 13.5 - 17.5 g/dL    Hematocrit 33.6 (L) 41.0 - 52.0 %    MCV 88 80 - 100 fL    MCH 28.9 26.0 - 34.0 pg    MCHC 32.7 32.0 - 36.0 g/dL    RDW 15.3 (H) 11.5 - 14.5 %    Platelets 160 150 - 450 x10*3/uL   Magnesium   Result Value Ref Range    Magnesium 1.85 1.60 - 2.40 mg/dL   Protime-INR   Result Value Ref Range    Protime 25.9 (H) 9.8 - 12.4 seconds    INR 2.3 (H) 0.9 - 1.1   POCT GLUCOSE   Result Value Ref Range    POCT Glucose 137 (H) 74 - 99 mg/dL   POCT GLUCOSE   Result Value Ref Range    POCT Glucose 170 (H) 74 - 99 mg/dL   POCT GLUCOSE   Result Value Ref Range    POCT Glucose 176 (H) 74 - 99 mg/dL   POCT GLUCOSE   Result Value Ref Range    POCT Glucose 154 (H) 74 - 99 mg/dL   Basic metabolic panel   Result Value Ref Range    Glucose 125 (H) 74 - 99 mg/dL    Sodium 134 (L) 136 - 145 mmol/L    Potassium 3.4 (L) 3.5 - 5.3 mmol/L    Chloride 88 (L) 98 - 107 mmol/L    Bicarbonate 40 (HH) 21 - 32 mmol/L    Anion Gap 9 (L) 10 - 20 mmol/L     Urea Nitrogen 33 (H) 6 - 23 mg/dL    Creatinine 1.00 0.50 - 1.30 mg/dL    eGFR 71 >60 mL/min/1.73m*2    Calcium 9.2 8.6 - 10.3 mg/dL   CBC   Result Value Ref Range    WBC 8.1 4.4 - 11.3 x10*3/uL    nRBC 0.0 0.0 - 0.0 /100 WBCs    RBC 4.00 (L) 4.50 - 5.90 x10*6/uL    Hemoglobin 11.8 (L) 13.5 - 17.5 g/dL    Hematocrit 35.5 (L) 41.0 - 52.0 %    MCV 89 80 - 100 fL    MCH 29.5 26.0 - 34.0 pg    MCHC 33.2 32.0 - 36.0 g/dL    RDW 14.7 (H) 11.5 - 14.5 %    Platelets 186 150 - 450 x10*3/uL   Magnesium   Result Value Ref Range    Magnesium 1.97 1.60 - 2.40 mg/dL   Protime-INR   Result Value Ref Range    Protime 26.8 (H) 9.8 - 12.4 seconds    INR 2.4 (H) 0.9 - 1.1   POCT GLUCOSE   Result Value Ref Range    POCT Glucose 176 (H) 74 - 99 mg/dL   POCT GLUCOSE   Result Value Ref Range    POCT Glucose 157 (H) 74 - 99 mg/dL   POCT GLUCOSE   Result Value Ref Range    POCT Glucose 160 (H) 74 - 99 mg/dL   POCT GLUCOSE   Result Value Ref Range    POCT Glucose 157 (H) 74 - 99 mg/dL   Basic metabolic panel   Result Value Ref Range    Glucose 129 (H) 74 - 99 mg/dL    Sodium 135 (L) 136 - 145 mmol/L    Potassium 3.8 3.5 - 5.3 mmol/L    Chloride 89 (L) 98 - 107 mmol/L    Bicarbonate 39 (H) 21 - 32 mmol/L    Anion Gap 11 10 - 20 mmol/L    Urea Nitrogen 43 (H) 6 - 23 mg/dL    Creatinine 1.13 0.50 - 1.30 mg/dL    eGFR 62 >60 mL/min/1.73m*2    Calcium 8.9 8.6 - 10.3 mg/dL   CBC   Result Value Ref Range    WBC 7.9 4.4 - 11.3 x10*3/uL    nRBC 0.0 0.0 - 0.0 /100 WBCs    RBC 3.86 (L) 4.50 - 5.90 x10*6/uL    Hemoglobin 11.2 (L) 13.5 - 17.5 g/dL    Hematocrit 33.9 (L) 41.0 - 52.0 %    MCV 88 80 - 100 fL    MCH 29.0 26.0 - 34.0 pg    MCHC 33.0 32.0 - 36.0 g/dL    RDW 14.6 (H) 11.5 - 14.5 %    Platelets 198 150 - 450 x10*3/uL   Magnesium   Result Value Ref Range    Magnesium 1.95 1.60 - 2.40 mg/dL   Protime-INR   Result Value Ref Range    Protime 31.5 (H) 9.8 - 12.4 seconds    INR 2.8 (H) 0.9 - 1.1   POCT GLUCOSE   Result Value Ref Range    POCT Glucose  217 (H) 74 - 99 mg/dL   POCT GLUCOSE   Result Value Ref Range    POCT Glucose 152 (H) 74 - 99 mg/dL               Assessment & Plan  Biventricular congestive heart failure  89-year-old gentleman with known history of severe coronary artery disease status post PCI of LAD in 2014 and NSTEMI in 2022, history of chronic atrial fibrillation, hypertension, type 2 diabetes mellitus presents with increasing shortness of breath and leg edema, orthopnea and moderate-size increased right pleural effusion.  Patient has been taking his Lasix and metolazone weekly as prescribed as an outpatient.  His chest x-ray was consistent with right pleural effusion significantly larger than before and EKG shows atrial fibrillation with slow ventricular response and his BN peptide is elevated on admission.  Since then his blood pressures have come up and his condition has improved significantly after hospitalization and adequate treatment.    1.  Acute on chronic diastolic heart failure with increasing right edema and moderate-sized right pleural effusion and acute hypoxic respiratory failure  Chest x-ray consistent with large right pleural effusion, had thoracocentesis on April 18, 2025 and 1300 cc of pleural fluid was removed, pleural fluid cytology analysis is pending.  IV diuretics Lasix 40 mg IV twice daily, cardiology input appreciated.  Echocardiogram reviewed consistent with diastolic dysfunction.  Significant improvement, leg edema resolved diuresed good, will reduce Lasix to 40 mg po bid.  Serum potassium 3.4, patient receiving 20 mg of potassium daily.     2.  Chronic atrial fibrillation with slow ventricular response and blood pressures were rather low on admission therefore his diltiazem and metoprolol is being held, however today heart rate is around 100 to110 bpm and blood pressures have improved therefore  resumed metoprolol.  As well as seen by cardiology and patient  received IV digoxin other day.  Patient seen by   Sunni today and patient is restarted on Cardizem CD to 240 mg daily.  Blood pressures stable now.  Patient has been on Coumadin and INR is 2.8, on Coumadin 5 mg daily     3.  Diabetes mellitus type 2 stable, continue with current regime of metformin and sliding scale insulin if necessary, most recent A1c was 6.4.     4.Previous history of NSTEMI and LAD PCI.     Reviewed all labs and imaging studies discussed the plan of treatment with patient and his wife on the day,   and she is updated with patient's condition    Anticipate discharge home today.     I spent 35 minutes in the professional and overall care of this patient.       I spent 35 minutes in the professional and overall care of this patient.      Chon Lanza MD         [1] aspirin, 81 mg, oral, Daily  atorvastatin, 80 mg, oral, Daily  cholecalciferol, 50 mcg, oral, Daily  dilTIAZem CD, 240 mg, oral, Daily  [Held by provider] doxazosin, 6 mg, oral, Daily  furosemide, 40 mg, oral, Daily  insulin lispro, 0-5 Units, subcutaneous, TID AC  metFORMIN XR, 500 mg, oral, Daily  metoprolol tartrate, 50 mg, oral, BID  pantoprazole, 40 mg, oral, Daily before breakfast  potassium chloride CR, 20 mEq, oral, Daily  warfarin, 5 mg, oral, Daily  [2]    [3] PRN medications: acetaminophen **OR** acetaminophen **OR** acetaminophen, alum-mag hydroxide-simeth, dextromethorphan-guaifenesin, ipratropium-albuteroL, melatonin, metoprolol, ondansetron ODT **OR** ondansetron, oxygen, polyethylene glycol

## 2025-04-22 NOTE — CARE PLAN
The patient's goals for the shift include maintain sp02 greater than 92%    The clinical goals for the shift include monitor spo2    Over the shift, the patient did not make progress toward the following goals. Barriers to progression include . Recommendations to address these barriers include   Problem: Pain - Adult  Goal: Verbalizes/displays adequate comfort level or baseline comfort level  Outcome: Progressing     Problem: Safety - Adult  Goal: Free from fall injury  Outcome: Progressing     Problem: Nutrition  Goal: Nutrient intake appropriate for maintaining nutritional needs  Outcome: Progressing     Problem: Fall/Injury  Goal: Not fall by end of shift  Outcome: Progressing  Goal: Be free from injury by end of the shift  Outcome: Progressing  Goal: Verbalize understanding of personal risk factors for fall in the hospital  Outcome: Progressing  Goal: Verbalize understanding of risk factor reduction measures to prevent injury from fall in the home  Outcome: Progressing  Goal: Use assistive devices by end of the shift  Outcome: Progressing  Goal: Pace activities to prevent fatigue by end of the shift  Outcome: Progressing     Problem: Skin  Goal: Decreased wound size/increased tissue granulation at next dressing change  Outcome: Progressing  Goal: Participates in plan/prevention/treatment measures  Outcome: Progressing  Goal: Prevent/manage excess moisture  Outcome: Progressing  Goal: Prevent/minimize sheer/friction injuries  Outcome: Progressing  Goal: Promote/optimize nutrition  Outcome: Progressing  Goal: Promote skin healing  Outcome: Progressing   .

## 2025-04-22 NOTE — ASSESSMENT & PLAN NOTE
89-year-old gentleman with known history of severe coronary artery disease status post PCI of LAD in 2014 and NSTEMI in 2022, history of chronic atrial fibrillation, hypertension, type 2 diabetes mellitus presents with increasing shortness of breath and leg edema, orthopnea and moderate-size increased right pleural effusion.  Patient has been taking his Lasix and metolazone weekly as prescribed as an outpatient.  His chest x-ray was consistent with right pleural effusion significantly larger than before and EKG shows atrial fibrillation with slow ventricular response and his BN peptide is elevated on admission.  Since then his blood pressures have come up and his condition has improved significantly after hospitalization and adequate treatment.

## 2025-04-22 NOTE — PROGRESS NOTES
Occupational Therapy    Occupational Therapy Treatment    Name: Ariel Rodríguez  MRN: [id number]  Department: [place] [id number]  Room: [address] [place] [address]  Date: 04/22/25  Time Calculation  Start Time: 1520  Stop Time: 1550  Time Calculation (min): 30 min    Assessment:  OT Assessment: Pt demo's progression towards goals though continues to function below baseline, continue OT, anticipate HIGH intensity at dc.  Prognosis: Good  Barriers to Discharge Home: Physical needs  Physical Needs: Ambulating household distances limited by function/safety, High falls risk due to function or environment  Medical Staff Made Aware: Yes  End of Session Communication: Bedside nurse  End of Session Patient Position: Up in chair, Alarm on  Plan:  Treatment Interventions: ADL retraining, Functional transfer training, UE strengthening/ROM, Endurance training, Cognitive reorientation, Neuromuscular reeducation, Equipment evaluation/education, Patient/family training, Fine motor coordination activities, Compensatory technique education  OT Frequency: 4 times per week  OT Discharge Recommendations: High intensity level of continued care  OT Recommended Transfer Status: Assist of 1  OT - OK to Discharge: Yes    Subjective   Previous Visit Info:  OT Last Visit  OT Received On: 04/22/25  General:  General  Family/Caregiver Present: Yes  Caregiver Feedback: spouse present, supportive  Prior to Session Communication: Bedside nurse  Patient Position Received: Bed, 3 rail up, Alarm on  Preferred Learning Style: auditory, kinesthetic, verbal  General Comment: Pt pleasant and agreeable to OT . Pt's wife present for session  Precautions:  Medical Precautions: Fall precautions, Oxygen therapy device and L/min    Pain Assessment:  Pain Assessment  Pain Assessment: 0-10  0-10 (Numeric) Pain Score: 0 - No pain    Objective   Cognition:  Overall Cognitive Status: Within Functional Limits  Orientation Level: Disoriented to time  Attention: Within Functional Limits  Insight:  Mild  Impulsive: Within functional limits  Activities of Daily Living: Feeding  Feeding Level of Assistance: Independent    Grooming  Grooming Level of Assistance: Contact guard, Minimum assistance, Minimal verbal cues, Tactile cues  Grooming Where Assessed: Standing sinkside  Grooming Comments: completed hand hygiene and oral care while standing at sinkside. cga with occasional min assist at trunk due to slight retropulsion, pt does assist in anterior weight shifting                        Toileting  Toileting Level of Assistance: Close supervision  Where Assessed: Toilet  Toileting Comments: seated on toilet for urinating while hooked up to chayack (has baseline difficulty voiding) (requires assist for toilet transfer)    Functional Standing Tolerance:  Functional Standing Tolerance  Time: 5 min  Activity: hygiene tasks while standing at sink  Functional Standing Tolerance Comments: requires hand support on sink and occasional trunk support  Bed Mobility/Transfers: Bed Mobility  Bed Mobility: Yes  Bed Mobility 1  Bed Mobility 1: Supine to sitting  Level of Assistance 1: Contact guard  Bed Mobility Comments 1: use of bed rails and HOB slightly elevated    Transfers  Transfer: Yes  Transfer 1  Transfer to 1: Toilet, Bed, Chair with arms  Technique 1: Sit to stand, Stand to sit  Transfer Device 1: Walker  Transfer Level of Assistance 1: Minimum assistance, Moderate verbal cues, Moderate tactile cues  Trials/Comments 1: cues for hand polacement, use of grab bar during toilet transfer; min assist for forward propulsion to stand, cga for eccentric lowering control to sit              Functional Mobility:  Functional Mobility  Functional Mobility Performed: Yes  Functional Mobility 1  Surface 1: Level tile  Device 1: Rolling walker  Assistance 1: Contact guard, Minimal verbal cues, Minimal tactile cues  Comments 1: cga with cues for ww positioning and proximity at times; ambulated to/from restroom without overt balance  or safety deficits  Sitting Balance:  Static Sitting Balance  Static Sitting-Level of Assistance: Distant supervision  Dynamic Sitting Balance  Dynamic Sitting-Level of Assistance: Close supervision  Standing Balance:  Static Standing Balance  Static Standing-Balance Support: Left upper extremity supported  Static Standing-Level of Assistance: Contact guard, Minimum assistance  Dynamic Standing Balance  Dynamic Standing-Balance Support: Bilateral upper extremity supported  Dynamic Standing-Level of Assistance: Minimum assistance    RUE   RUE : Within Functional Limits, LUE   LUE: Within Functional Limits,  , and      Outcome Measures:  [facility] Daily Activity  Putting on and taking off regular lower body clothing: A lot  Bathing (including washing, rinsing, drying): A lot  Putting on and taking off regular upper body clothing: A little  Toileting, which includes using toilet, bedpan or urinal: A little  Taking care of personal grooming such as brushing teeth: A little  Eating Meals: None  Daily Activity - Total Score: 17      Education Documentation  Body Mechanics, taught by Don Cheek OT at 4/22/2025  5:16 PM.  Learner: Patient  Readiness: Acceptance  Method: Explanation  Response: Verbalizes Understanding    Precautions, taught by oDn Cheek OT at 4/22/2025  5:16 PM.  Learner: Patient  Readiness: Acceptance  Method: Explanation  Response: Verbalizes Understanding    ADL Training, taught by Don Cheek OT at 4/22/2025  5:16 PM.  Learner: Patient  Readiness: Acceptance  Method: Explanation  Response: Verbalizes Understanding    Education Comments  No comments found.      Goals:  Encounter Problems       Encounter Problems (Active)       ADLs       Patient with complete upper body dressing with independent level of assistance donning and doffing all UE clothes while edge of bed  (Progressing)       Start:  04/19/25    Expected End:  05/03/25            Patient will complete lower body dressing with  modified independent level of assistance donning and doffing all LE clothes  with PRN adaptive equipment while edge of bed  and standing (Progressing)       Start:  04/19/25    Expected End:  05/03/25            Patient will complete daily grooming tasks  with contact guard assist level of assistance and PRN adaptive equipment while standing. (Progressing)       Start:  04/19/25    Expected End:  05/03/25            Patient will complete toileting including hygiene clothing management/hygiene with minimal assist  level of assistance and raised toilet seat and grab bars. (Progressing)       Start:  04/19/25    Expected End:  05/03/25               BALANCE       Pt will maintain dynamic standing balance during ADL task with contact guard assist level of assistance in order to demonstrate decreased risk of falling and improved postural control. (Progressing)       Start:  04/19/25    Expected End:  05/03/25            Patient will tolerate standing for 10 minutes to contact guard assist level of assistance with least restrictive device in order to improve functional activity tolerance for ADL tasks. (Progressing)       Start:  04/19/25    Expected End:  05/03/25               EXERCISE/STRENGTHENING       Patient will be educated on BUE HEP for increased ADL performance. (Progressing)       Start:  04/19/25    Expected End:  05/03/25               TRANSFERS       Patient will complete sit to stand transfer with contact guard assist level of assistance and least restrictive device in order to improve safety and prepare for out of bed mobility. (Progressing)       Start:  04/19/25    Expected End:  05/03/25

## 2025-04-23 ENCOUNTER — PHARMACY VISIT (OUTPATIENT)
Dept: PHARMACY | Facility: CLINIC | Age: OVER 89
End: 2025-04-23
Payer: MEDICARE

## 2025-04-23 ENCOUNTER — HOME HEALTH ADMISSION (OUTPATIENT)
Dept: HOME HEALTH SERVICES | Facility: HOME HEALTH | Age: OVER 89
End: 2025-04-23
Payer: MEDICARE

## 2025-04-23 ENCOUNTER — DOCUMENTATION (OUTPATIENT)
Dept: HOME HEALTH SERVICES | Facility: HOME HEALTH | Age: OVER 89
End: 2025-04-23
Payer: MEDICARE

## 2025-04-23 VITALS
WEIGHT: 170 LBS | RESPIRATION RATE: 16 BRPM | OXYGEN SATURATION: 98 % | DIASTOLIC BLOOD PRESSURE: 71 MMHG | BODY MASS INDEX: 30.12 KG/M2 | SYSTOLIC BLOOD PRESSURE: 122 MMHG | TEMPERATURE: 97.3 F | HEART RATE: 62 BPM | HEIGHT: 63 IN

## 2025-04-23 LAB
ACID FAST STN SPEC: NORMAL
ANION GAP SERPL CALC-SCNC: 10 MMOL/L (ref 10–20)
BUN SERPL-MCNC: 42 MG/DL (ref 6–23)
CALCIUM SERPL-MCNC: 8.9 MG/DL (ref 8.6–10.3)
CHLORIDE SERPL-SCNC: 90 MMOL/L (ref 98–107)
CO2 SERPL-SCNC: 39 MMOL/L (ref 21–32)
CREAT SERPL-MCNC: 1.02 MG/DL (ref 0.5–1.3)
EGFRCR SERPLBLD CKD-EPI 2021: 70 ML/MIN/1.73M*2
ERYTHROCYTE [DISTWIDTH] IN BLOOD BY AUTOMATED COUNT: 14.5 % (ref 11.5–14.5)
GLUCOSE BLD MANUAL STRIP-MCNC: 150 MG/DL (ref 74–99)
GLUCOSE BLD MANUAL STRIP-MCNC: 161 MG/DL (ref 74–99)
GLUCOSE BLD MANUAL STRIP-MCNC: 164 MG/DL (ref 74–99)
GLUCOSE SERPL-MCNC: 133 MG/DL (ref 74–99)
HCT VFR BLD AUTO: 36 % (ref 41–52)
HGB BLD-MCNC: 11.6 G/DL (ref 13.5–17.5)
INR PPP: 3.8 (ref 0.9–1.1)
LABORATORY COMMENT REPORT: NORMAL
LABORATORY COMMENT REPORT: NORMAL
MAGNESIUM SERPL-MCNC: 2.05 MG/DL (ref 1.6–2.4)
MCH RBC QN AUTO: 28.5 PG (ref 26–34)
MCHC RBC AUTO-ENTMCNC: 32.2 G/DL (ref 32–36)
MCV RBC AUTO: 89 FL (ref 80–100)
MYCOBACTERIUM SPEC CULT: NORMAL
NRBC BLD-RTO: 0 /100 WBCS (ref 0–0)
PATH REPORT.FINAL DX SPEC: NORMAL
PATH REPORT.GROSS SPEC: NORMAL
PATH REPORT.RELEVANT HX SPEC: NORMAL
PATH REPORT.TOTAL CANCER: NORMAL
PLATELET # BLD AUTO: 209 X10*3/UL (ref 150–450)
POTASSIUM SERPL-SCNC: 4.2 MMOL/L (ref 3.5–5.3)
PROTHROMBIN TIME: 42.2 SECONDS (ref 9.8–12.4)
RBC # BLD AUTO: 4.07 X10*6/UL (ref 4.5–5.9)
SODIUM SERPL-SCNC: 135 MMOL/L (ref 136–145)
WBC # BLD AUTO: 8.2 X10*3/UL (ref 4.4–11.3)

## 2025-04-23 PROCEDURE — 82374 ASSAY BLOOD CARBON DIOXIDE: CPT | Performed by: NURSE PRACTITIONER

## 2025-04-23 PROCEDURE — 2500000001 HC RX 250 WO HCPCS SELF ADMINISTERED DRUGS (ALT 637 FOR MEDICARE OP): Performed by: NURSE PRACTITIONER

## 2025-04-23 PROCEDURE — 85027 COMPLETE CBC AUTOMATED: CPT | Performed by: NURSE PRACTITIONER

## 2025-04-23 PROCEDURE — 94761 N-INVAS EAR/PLS OXIMETRY MLT: CPT

## 2025-04-23 PROCEDURE — 99239 HOSP IP/OBS DSCHRG MGMT >30: CPT | Performed by: INTERNAL MEDICINE

## 2025-04-23 PROCEDURE — 83735 ASSAY OF MAGNESIUM: CPT | Performed by: NURSE PRACTITIONER

## 2025-04-23 PROCEDURE — 85610 PROTHROMBIN TIME: CPT | Performed by: NURSE PRACTITIONER

## 2025-04-23 PROCEDURE — 2500000001 HC RX 250 WO HCPCS SELF ADMINISTERED DRUGS (ALT 637 FOR MEDICARE OP): Performed by: INTERNAL MEDICINE

## 2025-04-23 PROCEDURE — 82947 ASSAY GLUCOSE BLOOD QUANT: CPT

## 2025-04-23 PROCEDURE — 36415 COLL VENOUS BLD VENIPUNCTURE: CPT | Performed by: NURSE PRACTITIONER

## 2025-04-23 PROCEDURE — 2500000002 HC RX 250 W HCPCS SELF ADMINISTERED DRUGS (ALT 637 FOR MEDICARE OP, ALT 636 FOR OP/ED): Performed by: NURSE PRACTITIONER

## 2025-04-23 PROCEDURE — RXMED WILLOW AMBULATORY MEDICATION CHARGE

## 2025-04-23 RX ORDER — RAMIPRIL 5 MG/1
1 CAPSULE ORAL DAILY
COMMUNITY
Start: 2025-04-23

## 2025-04-23 RX ORDER — DILTIAZEM HYDROCHLORIDE 240 MG/1
240 CAPSULE, COATED, EXTENDED RELEASE ORAL DAILY
Qty: 30 CAPSULE | Refills: 0 | Status: SHIPPED | OUTPATIENT
Start: 2025-04-23

## 2025-04-23 RX ORDER — METOPROLOL TARTRATE 25 MG/1
25 TABLET, FILM COATED ORAL 2 TIMES DAILY
Qty: 60 TABLET | Refills: 0 | Status: SHIPPED | OUTPATIENT
Start: 2025-04-23

## 2025-04-23 RX ORDER — DOXAZOSIN 4 MG/1
1.5 TABLET ORAL DAILY
COMMUNITY
Start: 2025-04-23

## 2025-04-23 RX ORDER — WARFARIN SODIUM 5 MG/1
TABLET ORAL
COMMUNITY
Start: 2025-04-23

## 2025-04-23 RX ADMIN — PANTOPRAZOLE SODIUM 40 MG: 40 TABLET, DELAYED RELEASE ORAL at 06:02

## 2025-04-23 RX ADMIN — ATORVASTATIN CALCIUM 80 MG: 80 TABLET, FILM COATED ORAL at 08:27

## 2025-04-23 RX ADMIN — ASPIRIN 81 MG: 81 TABLET, COATED ORAL at 08:27

## 2025-04-23 RX ADMIN — POTASSIUM CHLORIDE 20 MEQ: 1500 TABLET, EXTENDED RELEASE ORAL at 08:27

## 2025-04-23 RX ADMIN — GUAIFENESIN SYRUP AND DEXTROMETHORPHAN 5 ML: 100; 10 SYRUP ORAL at 01:30

## 2025-04-23 RX ADMIN — METFORMIN ER 500 MG 500 MG: 500 TABLET ORAL at 08:28

## 2025-04-23 RX ADMIN — INSULIN LISPRO 1 UNITS: 100 INJECTION, SOLUTION INTRAVENOUS; SUBCUTANEOUS at 08:28

## 2025-04-23 RX ADMIN — FUROSEMIDE 40 MG: 40 TABLET ORAL at 08:28

## 2025-04-23 RX ADMIN — METOPROLOL TARTRATE 50 MG: 50 TABLET, FILM COATED ORAL at 08:27

## 2025-04-23 RX ADMIN — GUAIFENESIN SYRUP AND DEXTROMETHORPHAN 5 ML: 100; 10 SYRUP ORAL at 10:32

## 2025-04-23 RX ADMIN — INSULIN LISPRO 1 UNITS: 100 INJECTION, SOLUTION INTRAVENOUS; SUBCUTANEOUS at 12:18

## 2025-04-23 RX ADMIN — Medication 50 MCG: at 06:02

## 2025-04-23 RX ADMIN — GUAIFENESIN SYRUP AND DEXTROMETHORPHAN 5 ML: 100; 10 SYRUP ORAL at 06:05

## 2025-04-23 RX ADMIN — DILTIAZEM HYDROCHLORIDE 240 MG: 120 CAPSULE, COATED, EXTENDED RELEASE ORAL at 08:27

## 2025-04-23 ASSESSMENT — COGNITIVE AND FUNCTIONAL STATUS - GENERAL
CLIMB 3 TO 5 STEPS WITH RAILING: A LITTLE
PERSONAL GROOMING: A LITTLE
DRESSING REGULAR UPPER BODY CLOTHING: A LITTLE
HELP NEEDED FOR BATHING: A LITTLE
MOVING TO AND FROM BED TO CHAIR: A LITTLE
WALKING IN HOSPITAL ROOM: A LITTLE
DRESSING REGULAR LOWER BODY CLOTHING: A LITTLE
TURNING FROM BACK TO SIDE WHILE IN FLAT BAD: A LITTLE
DAILY ACTIVITIY SCORE: 19
TOILETING: A LITTLE
MOVING FROM LYING ON BACK TO SITTING ON SIDE OF FLAT BED WITH BEDRAILS: A LITTLE
STANDING UP FROM CHAIR USING ARMS: A LITTLE
MOBILITY SCORE: 18

## 2025-04-23 ASSESSMENT — PAIN SCALES - GENERAL
PAINLEVEL_OUTOF10: 0 - NO PAIN
PAINLEVEL_OUTOF10: 0 - NO PAIN

## 2025-04-23 ASSESSMENT — PAIN - FUNCTIONAL ASSESSMENT
PAIN_FUNCTIONAL_ASSESSMENT: 0-10
PAIN_FUNCTIONAL_ASSESSMENT: 0-10

## 2025-04-23 NOTE — PROGRESS NOTES
Medicine PA follow up note    Subjective:  Patient sitting up in chair. Family at beside. On 1.5 L NC. Cardiology has signed off.    Vitals (Last 24 Hours):  Heart Rate:  []   Temp:  [36.8 °C (98.3 °F)-37.1 °C (98.8 °F)]   Resp:  [16-19]   BP: (117-131)/(69-78)   SpO2:  [84 %-96 %]       I have reviewed all imaging reports and labs pertinent to this visit / presenting problem    PHYSICAL EXAM:  Constitutional: NAD, alert and cooperative  Eyes: no icterus  ENMT: mucous membranes moist, no lesions  Head/Neck: supple  Respiratory/Thorax: CTA bilaterally, non-labored breathing, no cough, on 1.5 L NC  Cardiovascular: irregularly irregular rhythm, no murmurs heard  Gastrointestinal: ND/S/NT  : no Strong, no SP/flank discomfort  Musculoskeletal: no joint swelling, ROM intact  Extremities: trace edema bilat lower extremities   Neurological: non-focal  Skin: warm and dry  Psych: calm, stable mood     MEDS:  Scheduled meds  Scheduled Medications[1]    Continuous meds  Continuous Medications[2]    PRN meds  PRN Medications[3]      ASSESSMENT/PLAN:  Ariel Rodríguez is a 89 yo man with h/o CAD s/p PCI, NSTEMI 8/2014, HFpEF, A-Fib on Coumadin, HTN, HLD, DMT2, right pleural effusion, presented with shortness of breath. CXR with right-sided pleural effusion and airspace consolidation, BNP elevated 466.      Acute hypoxic respiratory failure   R pleural effusion s/p right-sided thoracentesis 4/18, 1300 mL of straw-yellow fluid drained - exudate per light's criteria   Acute on chronic HFpEF   Small/mod pericardial effusion without evidence of tamponade   -improved volume status  -TTE EF 60% 4/18  -IV lasix stopped -> switched to lasix 40 mg po, of note - he is on metolazone 2.5 mg once a week in addition to Lasix, contraction alkalosis noted  -currently low suspicion for PNA, pleural fluid cytology pending   -CT chest 4/21 noted, effusion small per IR review and repeat thora deferred  -encourage incentive spirometry   -pt  follow with pulm as outpt   -continue to wean O2 as able, on 1.5 L today   -cardiology signed off today     A-Fib RVR   -s/p Dig on 4/20  -rates controlled today  -continue metoprolol 50 mg BID and cardizem 240 mg daily  -continue warfarin 5 mg daily INR 2.8     HTN  -holding chronic BP meds (ramipril, cardura) for now      CAD s/p PCI  -continue ASA 81 mg daily, atorvastatin 80 mg daily     DM  -Accu-Cheks ACHS  -Humalog correctional sliding scale   -Continue metformin 500 mg daily     Other comorbidities as above  -continue medications as ordered and adjust based on clinical course     VTE / GI prophylaxis   -warfarin, PPI, bowel regimen in place     Discharge planning  -[facility] when medically stable, pt/wife declining SNF  -still weaning oxygen, anticipate discharge tomorrow     Discussed with Dr. Lanza and the interdisciplinary team     Alexa Tolentino PA-C          [1] aspirin, 81 mg, oral, Daily  atorvastatin, 80 mg, oral, Daily  cholecalciferol, 50 mcg, oral, Daily  dilTIAZem CD, 240 mg, oral, Daily  [Held by provider] doxazosin, 6 mg, oral, Daily  furosemide, 40 mg, oral, Daily  insulin lispro, 0-5 Units, subcutaneous, TID AC  metFORMIN XR, 500 mg, oral, Daily  metoprolol tartrate, 50 mg, oral, BID  pantoprazole, 40 mg, oral, Daily before breakfast  potassium chloride CR, 20 mEq, oral, Daily  warfarin, 5 mg, oral, Daily  [2]    [3] PRN medications: acetaminophen **OR** acetaminophen **OR** acetaminophen, alum-mag hydroxide-simeth, dextromethorphan-guaifenesin, ipratropium-albuteroL, melatonin, metoprolol, ondansetron ODT **OR** ondansetron, oxygen, polyethylene glycol

## 2025-04-23 NOTE — HH CARE COORDINATION
Home Care received a Referral for Nursing, Physical Therapy, and Occupational Therapy. We have processed the referral for a Start of Care on 4/25/26.     If you have any questions or concerns, please feel free to contact us at [phone]. Follow the prompts, enter your five digit zip code, and you will be directed to your care team on [facility] 2.

## 2025-04-23 NOTE — CARE PLAN
Problem: Pain - Adult  Goal: Verbalizes/displays adequate comfort level or baseline comfort level  Outcome: Progressing     Problem: Safety - Adult  Goal: Free from fall injury  Outcome: Progressing     Problem: Discharge Planning  Goal: Discharge to home or other facility with appropriate resources  Outcome: Progressing     Problem: Chronic Conditions and Co-morbidities  Goal: Patient's chronic conditions and co-morbidity symptoms are monitored and maintained or improved  Outcome: Progressing     Problem: Nutrition  Goal: Nutrient intake appropriate for maintaining nutritional needs  Outcome: Progressing     Problem: Fall/Injury  Goal: Not fall by end of shift  Outcome: Progressing  Goal: Be free from injury by end of the shift  Outcome: Progressing  Goal: Verbalize understanding of personal risk factors for fall in the hospital  Outcome: Progressing  Goal: Verbalize understanding of risk factor reduction measures to prevent injury from fall in the home  Outcome: Progressing  Goal: Use assistive devices by end of the shift  Outcome: Progressing  Goal: Pace activities to prevent fatigue by end of the shift  Outcome: Progressing     Problem: Skin  Goal: Decreased wound size/increased tissue granulation at next dressing change  Outcome: Progressing  Goal: Participates in plan/prevention/treatment measures  Outcome: Progressing  Goal: Prevent/manage excess moisture  Outcome: Progressing  Goal: Prevent/minimize sheer/friction injuries  Outcome: Progressing  Goal: Promote/optimize nutrition  Outcome: Progressing  Goal: Promote skin healing  Outcome: Progressing     Problem: Respiratory  Goal: Clear secretions with interventions this shift  Outcome: Progressing  Goal: Minimize anxiety/maximize coping throughout shift  Outcome: Progressing  Goal: Minimal/no exertional discomfort or dyspnea this shift  Outcome: Progressing  Goal: No signs of respiratory distress (eg. Use of accessory muscles. Peds grunting)  Outcome:  Progressing  Goal: Tolerate pulmonary toileting this shift  Outcome: Progressing  Goal: Verbalize decreased shortness of breath this shift  Outcome: Progressing  Goal: Wean oxygen to maintain O2 saturation per order/standard this shift  Outcome: Progressing  Goal: Increase self care and/or family involvement in next 24 hours  Outcome: Progressing     Problem: Diabetes  Goal: Achieve decreasing blood glucose levels by end of shift  Outcome: Progressing  Goal: Increase stability of blood glucose readings by end of shift  Outcome: Progressing  Goal: Decrease in ketones present in urine by end of shift  Outcome: Progressing  Goal: Maintain electrolyte levels within acceptable range throughout shift  Outcome: Progressing  Goal: Maintain glucose levels >70mg/dl to <250mg/dl throughout shift  Outcome: Progressing  Goal: No changes in neurological exam by end of shift  Outcome: Progressing  Goal: Learn about and adhere to nutrition recommendations by end of shift  Outcome: Progressing  Goal: Vital signs within normal range for age by end of shift  Outcome: Progressing  Goal: Increase self care and/or family involovement by end of shift  Outcome: Progressing  Goal: Receive DSME education by end of shift  Outcome: Progressing   The patient's goals for the shift include maintain sp02 greater than 92%    The clinical goals for the shift include maintain SpO2> 92%

## 2025-04-23 NOTE — PROGRESS NOTES
Ariel Rodríguez is a 89 y.o. male admitted for shortness of breath. Pharmacy has been consulted for warfarin dosing and monitoring for Atrial Fibrillation/Atrial Flutter with goal INR of 2.0-3.0.     Home regimen   MON TUE WED THR FRI SAT SUN   Dose 5  mg 5  mg 5  mg 5  mg 5  mg 5  mg 5  mg   Total weekly dose: 35 mg  Source: [facility]    Labs  INR: 3.8  Hgb/Hct/Plt  Lab Results   Component Value Date    HGB 11.6 (L) 04/23/2025    HGB 11.2 (L) 04/22/2025    HGB 11.8 (L) 04/21/2025    HGB 11.0 (L) 04/20/2025    HGB 11.1 (L) 04/19/2025        Lab Results   Component Value Date    HCT 36.0 (L) 04/23/2025    HCT 33.9 (L) 04/22/2025    HCT 35.5 (L) 04/21/2025    HCT 33.6 (L) 04/20/2025    HCT 33.2 (L) 04/19/2025        Platelets   Date Value Ref Range Status   04/23/2025 209 150 - 450 x10*3/uL Final   04/22/2025 198 150 - 450 x10*3/uL Final   04/21/2025 186 150 - 450 x10*3/uL Final   04/20/2025 160 150 - 450 x10*3/uL Final   04/19/2025 156 150 - 450 x10*3/uL Final      Warfarin Therapy   Current regimen: resuming home reigmen  Bridging: None needed  Interacting medications: no interacting medications    Dosing History During Current Admission  Date 4/17 4/18 4/19 4/20 4/21 4/22 4/23   INR 3.0 2.6 2.3 2.3 2.4 2.8 3.8   Dose  (mg) HOLD 3 mg 5 mg 5 mg 5 mg 5 mg HOLD     Assessment and Plan  Patient's INR of 3.8 today is Supratherapeutic. Hemoglobin/hematocrit/platelet stable  Warfarin inpatient plan: Hold dose today.  Monitor s/sx of bleeding including epistaxis, hematuria, unusual bruising, hemoptysis, hematochezia as well as s/sx of stroke including impaired speech, unilateral paralysis, blurry vision.  Pharmacy will continue to monitor the patient and adjust therapy as needed.    Thank you for the consult. Please do not hesitate to contact a pharmacist with any questions.    Gracie Ornelas, PharmD

## 2025-04-23 NOTE — NURSING NOTE
DC DANIELLE Pena provided dc instructions provided tp pt. Pt vitals stable.   Iv removed. Heart monitor off. Pt has no current complaints. Home o2 with pt. All belongings with pt. Pt ready for dc.

## 2025-04-23 NOTE — PROGRESS NOTES
04/23/25 1409   Discharge Planning   Who is requesting discharge planning? Provider   Home or Post Acute Services In home services   Type of Home Care Services Home OT;Home PT   Expected Discharge Disposition Home H   Does the patient need discharge transport arranged? No   Intensity of Service   Intensity of Service 0-30 min     4/23/25 1409  Anticipate discharge home today.  Patient and wife refusing SNF but agree to HHC and would like [facility].  [facility] referral placed.  Home O2 eval to be completed.  No additional HHC, DME, or discharge needs identified.  Guerline Rivera RN TCC

## 2025-04-23 NOTE — PROGRESS NOTES
Ariel Rodríguez is a 89 y.o. male on day 6 of admission presenting with Biventricular congestive heart failure.    Subjective   89-year-old gentleman with history of chronic diastolic heart failure and chronic atrial fibrillation with rapid ventricular response, chronic right pleural effusion was admitted with hypoxic respiratory failure and atrial fibrillation with rapid ventricular response and hypotension.  He underwent right thoracocentesis and 1300 cc of pleural fluid was removed, pleural fluid analysis is suggestive of an exudate however pH is 7.7 and no significant white cells were seen.  And there is no evidence of any pneumonic infiltrate.  He was also diuresed with IV Lasix.  On admission he was significantly hypotensive therefore his beta-blocker and Cardizem were held and subsequently both beta-blockers and Cardizem have been resumed and his heart rate has improved and he remains in atrial fibrillation with controlled ventricular rate now.  His breathing seems to have improved significantly and leg edema has completely resolved and blood pressure normalized.  He is ready for discharge home today to be followed by his pulmonologist Dr. Tolentino follow-up with cardiology Dr. ARRIAGA follow-up with PCP in 1 week.          Objective     Physical Exam  GENERAL:  Alert, mild distress, cooperative  SKIN:  Skin color, texture, turgor normal. No rashes or lesions.  OROPHARYNX:  Lips, mucosa, and tongue are normal.Teeth and gums, normal. Oropharynx normal.  NECK:  No jugulovenous distention, No carotid bruits, Carotid pulse normal contour, Supple  LUNGS: Improved air exchange, status post right thoracocentesis and 1300 cc of pleural fluid was removed on 4/18/25  CARDIAC: Atrial fibrillation with controlled rate now, normal S1 and S2; no rubs,  or gallops, 2/6 systolic ejection murmur left sternal border, CHF improving  ABDOMEN:  Abdomen soft, non-tender, BS normal, No masses or organomegaly  EXTREMETIES:   "Extremities normal, no deformities, trace edema,no clubbing   NEURO:  Alert, oriented X 3, Non-focal. PULSES:  2+ radial, 2+ carotid       Last Recorded Vitals  Blood pressure 132/77, pulse 95, temperature 36.6 °C (97.8 °F), temperature source Axillary, resp. rate 16, height 1.6 m (5' 3\"), weight 77.1 kg (170 lb), SpO2 95%.  Intake/Output last 3 Shifts:  I/O last 3 completed shifts:  In: 240 (3.1 mL/kg) [P.O.:240]  Out: 850 (11 mL/kg) [Urine:850 (0.3 mL/kg/hr)]  Weight: 77.1 kg     Relevant Results  Scheduled medications  Scheduled Medications[1]  Continuous medications  Continuous Medications[2]  PRN medications  PRN Medications[3]          Assessment & Plan  Biventricular congestive heart failure  89-year-old gentleman with known history of severe coronary artery disease status post PCI of LAD in 2014 and NSTEMI in 2022, history of chronic atrial fibrillation, hypertension, type 2 diabetes mellitus presents with increasing shortness of breath and leg edema, orthopnea and moderate-size increased right pleural effusion.  Patient has been taking his Lasix and metolazone weekly as prescribed as an outpatient.  His chest x-ray was consistent with right pleural effusion significantly larger than before and EKG shows atrial fibrillation with slow ventricular response and his BN peptide is elevated on admission.  Since then his blood pressures have come up and his condition has improved significantly after hospitalization and adequate treatment.  89-year-old gentleman with known history of severe coronary artery disease status post PCI of LAD in 2014 and NSTEMI in 2022, history of chronic atrial fibrillation, hypertension, type 2 diabetes mellitus presents with increasing shortness of breath and leg edema, orthopnea and moderate-size increased right pleural effusion.  Patient has been taking his Lasix and metolazone weekly as prescribed as an outpatient.  His chest x-ray was consistent with right pleural effusion " significantly larger than before and EKG shows atrial fibrillation with slow ventricular response and his BN peptide is elevated on admission.  Since then his blood pressures have come up and his condition has improved significantly after hospitalization and adequate treatment.     1.  Acute on chronic diastolic heart failure with increasing right edema and moderate-sized right pleural effusion and acute hypoxic respiratory failure  Chest x-ray consistent with large right pleural effusion, had thoracocentesis on April 18, 2025 and 1300 cc of pleural fluid was removed, pleural fluid cytology analysis is pending.  IV diuretics Lasix 40 mg IV twice daily, cardiology input appreciated.  Echocardiogram reviewed consistent with diastolic dysfunction.  Significant improvement, leg edema resolved diuresed good, l reduced Lasix to 40 mg po daily.  Redo oxygen desaturation studies to see if he would require oxygen at home.     2.  Chronic atrial fibrillation with slow ventricular response and blood pressures were rather low on admission therefore his diltiazem and metoprolol is being held, however today heart rate is around 100 to110 bpm and blood pressures have improved therefore  resumed metoprolol.  As well as seen by cardiology and patient  received IV digoxin other day.  Patient seen by Dr. Moeller today and patient is restarted on Cardizem CD to 240 mg daily.  Blood pressures stable now.  Patient has been on Coumadin and INR is 3.8, on Coumadin 5 mg daily     3.  Diabetes mellitus type 2 stable, continue with current regime of metformin and sliding scale insulin if necessary, most recent A1c was 6.4.     4.Previous history of NSTEMI and LAD PCI.     Reviewed all labs and imaging studies discussed the plan of treatment with patient and his wife on the day,  and she is updated with patient's condition other day.     Anticipate discharge home today.     I spent 35 minutes in the professional and overall care of this  patient.          I spent 35 minutes in the professional and overall care of this patient.      Chon Lanza MD         [1] aspirin, 81 mg, oral, Daily  atorvastatin, 80 mg, oral, Daily  cholecalciferol, 50 mcg, oral, Daily  dilTIAZem CD, 240 mg, oral, Daily  [Held by provider] doxazosin, 6 mg, oral, Daily  furosemide, 40 mg, oral, Daily  insulin lispro, 0-5 Units, subcutaneous, TID AC  metFORMIN XR, 500 mg, oral, Daily  metoprolol tartrate, 50 mg, oral, BID  pantoprazole, 40 mg, oral, Daily before breakfast  potassium chloride CR, 20 mEq, oral, Daily  [Held by provider] warfarin, 5 mg, oral, Daily  [2]    [3] PRN medications: acetaminophen **OR** acetaminophen **OR** acetaminophen, alum-mag hydroxide-simeth, dextromethorphan-guaifenesin, ipratropium-albuteroL, melatonin, metoprolol, ondansetron ODT **OR** ondansetron, oxygen, polyethylene glycol

## 2025-04-23 NOTE — SIGNIFICANT EVENT
Home oxygen evaluation completed.  Lowest SpO2 at rest 90%.  Patient ambulated on room air with desaturation to 86%.  Patient placed on 2lpm nasal cannula with recovery SpO2 93%.  Patient qualifies for 2 lpm with exertion, RN and Dr. Lanza notified.

## 2025-04-24 NOTE — DISCHARGE SUMMARY
Left ADMISSION DATE: 4/17/2025     DISCHARGE DATE:  04/23/25     Discharge Diagnosis  Acute hypoxic respiratory failure  Acute diastolic heart failure  Bilateral pleural effusion right greater than left  Right thoracocentesis and 1300 cc of pleural fluid removed, fluid analysis suggestive of transudate  Atrial fibrillation with  variable ventricular response  Diabetes mellitus type 2      Issues Requiring Follow-Up  Follow-up with PCP 1 week, follow-up with cardiology in 2 weeks        Discharge Meds     Your medication list        START taking these medications        Instructions Last Dose Given Next Dose Due   dilTIAZem  mg 24 hr capsule  Commonly known as: Cardizem CD  Replaces: dilTIAZem  mg 24 hr capsule      Take 1 capsule (240 mg) by mouth once daily.              CHANGE how you take these medications        Instructions Last Dose Given Next Dose Due   doxazosin 4 mg tablet  Commonly known as: Cardura  What changed: additional instructions           metoprolol tartrate 25 mg tablet  Commonly known as: Lopressor  What changed: additional instructions      Take 1 tablet (25 mg) by mouth 2 times a day.       ramipril 5 mg capsule  Commonly known as: Altace  What changed: additional instructions           warfarin 5 mg tablet  Commonly known as: Coumadin  What changed: additional instructions                  CONTINUE taking these medications        Instructions Last Dose Given Next Dose Due   acetaminophen 500 mg tablet  Commonly known as: Tylenol           aspirin 81 mg EC tablet           atorvastatin 80 mg tablet  Commonly known as: Lipitor      TAKE 1 TABLET(80 MG) BY MOUTH EVERY DAY       CENTRUM ORAL           cholecalciferol 50 mcg (2,000 units) capsule  Commonly known as: Vitamin D-3           Claritin Liqui-Gel 10 mg capsule  Generic drug: loratadine           furosemide 20 mg tablet  Commonly known as: Lasix      Take 2 tablets (40 mg) by mouth once daily.       metFORMIN  mg 24 hr  tablet  Commonly known as: Glucophage-XR      TAKE 1 TABLET(500 MG) BY MOUTH DAILY       metOLazone 2.5 mg tablet  Commonly known as: Zaroxolyn      TAKE 1 TABLET(2.5 MG) BY MOUTH 1 TIME EVERY WEEK       nitroglycerin 0.4 mg SL tablet  Commonly known as: Nitrostat      Place 1 tablet (0.4 mg) under the tongue every 5 minutes if needed for chest pain.       OSTEO BI-FLEX ORAL           pantoprazole 40 mg EC tablet  Commonly known as: ProtoNix      TAKE 1 TABLET BY MOUTH EVERY MORNING 30 MINUTES BEFORE BREAKFAST       potassium chloride CR 20 mEq ER tablet  Commonly known as: Klor-Con M20      Take 1 tablet (20 mEq) by mouth once daily.              STOP taking these medications      cyclobenzaprine 5 mg tablet  Commonly known as: Flexeril        dilTIAZem  mg 24 hr capsule  Commonly known as: Tiazac  Replaced by: dilTIAZem  mg 24 hr capsule                  Where to Get Your Medications        These medications were sent to [facility]  [address], [place] [address]      Hours: 8 AM to 6 PM Mon-Fri, 9 AM to 1 PM Saturday Phone: [phone]   dilTIAZem  mg 24 hr capsule  metoprolol tartrate 25 mg tablet             Results for orders placed or performed during the hospital encounter of 04/17/25 (from the past 24 hours)   Basic metabolic panel   Result Value Ref Range    Glucose 133 (H) 74 - 99 mg/dL    Sodium 135 (L) 136 - 145 mmol/L    Potassium 4.2 3.5 - 5.3 mmol/L    Chloride 90 (L) 98 - 107 mmol/L    Bicarbonate 39 (H) 21 - 32 mmol/L    Anion Gap 10 10 - 20 mmol/L    Urea Nitrogen 42 (H) 6 - 23 mg/dL    Creatinine 1.02 0.50 - 1.30 mg/dL    eGFR 70 >60 mL/min/1.73m*2    Calcium 8.9 8.6 - 10.3 mg/dL   CBC   Result Value Ref Range    WBC 8.2 4.4 - 11.3 x10*3/uL    nRBC 0.0 0.0 - 0.0 /100 WBCs    RBC 4.07 (L) 4.50 - 5.90 x10*6/uL    Hemoglobin 11.6 (L) 13.5 - 17.5 g/dL    Hematocrit 36.0 (L) 41.0 - 52.0 %    MCV 89 80 - 100 fL    MCH 28.5 26.0 - 34.0 pg    MCHC 32.2 32.0  - 36.0 g/dL    RDW 14.5 11.5 - 14.5 %    Platelets 209 150 - 450 x10*3/uL   Magnesium   Result Value Ref Range    Magnesium 2.05 1.60 - 2.40 mg/dL   Protime-INR   Result Value Ref Range    Protime 42.2 (H) 9.8 - 12.4 seconds    INR 3.8 (H) 0.9 - 1.1   POCT GLUCOSE   Result Value Ref Range    POCT Glucose 161 (H) 74 - 99 mg/dL   POCT GLUCOSE   Result Value Ref Range    POCT Glucose 164 (H) 74 - 99 mg/dL   POCT GLUCOSE   Result Value Ref Range    POCT Glucose 150 (H) 74 - 99 mg/dL      CT chest wo IV contrast  Result Date: 4/21/2025  Interpreted By:  Prabha Hobbs, STUDY: CT CHEST WO IV CONTRAST;  4/21/2025 11:23 am   INDICATION: Signs/Symptoms:dyspnea.   COMPARISON: 01/09/2024   ACCESSION NUMBER(S): [id number]   ORDERING CLINICIAN: LITA ARRIAGA   TECHNIQUE: Helical data acquisition of the chest was obtained without intravenous contrast. Images were reformatted in axial, coronal, and sagittal planes.   FINDINGS:   LIMITATIONS: Please note that the evaluation of vessels, lymph nodes and organs is limited without intravenous contrast. Breathing motion artifact is present which degrades the lung windows especially.   LUNGS AND AIRWAYS: The trachea and central airways are patent.   There is moderate-to-large right pleural effusion. Patchy density seen adjacent to the a right pleural effusion in the right lung base, which may be related to atelectasis or infiltrate   No left pleural effusion is seen. No pneumothorax is identified.   Elevation of the right hemidiaphragm is again noted.   MEDIASTINUM AND MAGNO, LOWER NECK AND AXILLA: The visualized thyroid gland is within normal limits.   No evidence of thoracic lymphadenopathy by CT criteria.   Esophagus appears within normal limits as seen.   HEART AND VESSELS: The thoracic aorta is of normal course and caliber with vascular calcifications.   Main pulmonary artery and its branches are normal in caliber.   Coronary artery calcifications are seen. The study is not optimized  for evaluation of coronary arteries.   The heart is enlarged.   Pericardial effusion is seen, which has increased since the prior exam.   UPPER ABDOMEN: The visualized subdiaphragmatic structures demonstrate no remarkable findings.   CHEST WALL AND OSSEOUS STRUCTURES: There are no suspicious osseous lesions. Multilevel degenerative changes are present       1.  Moderate to large right pleural effusion. 2. Cardiomegaly. 3. Pericardial effusion which has increased in size since the prior exam.   4. Patchy density in the right lung base adjacent to the pleural effusion, which may be related to atelectasis or infiltrate.   MACRO: None   Signed by: Prabha Hobbs 4/21/2025 11:48 AM Dictation workstation:   [id number]    XR chest 2 views  Result Date: 4/21/2025  Interpreted By:  Prabha Hobbs, STUDY: XR CHEST 2 VIEWS;  4/21/2025 11:03 am   INDICATION: Signs/Symptoms:Pleural effusion.   COMPARISON: 04/18/2025   ACCESSION NUMBER(S): [id number]   ORDERING CLINICIAN: MARIAN MORA   FINDINGS: Blunting of both costophrenic angles is seen, right greater than left, which most likely related to pleural effusions. These have not significantly changed. Bibasilar patchy densities are noted. The heart is enlarged. No pneumothorax is seen.       Bilateral pleural effusions, right greater than left, without significant change.   Bibasilar patchy densities, which may be related to atelectasis or infiltrates.   Cardiomegaly.     MACRO: None   Signed by: Prabha Hobbs 4/21/2025 11:06 AM Dictation workstation:   [id number]    Electrocardiogram, 12-lead PRN ACS symptoms  Result Date: 4/21/2025  Atrial fibrillation with rapid ventricular response Abnormal ECG When compared with ECG of 17-APR-2025 12:20, Vent. rate has increased BY  66 BPM Questionable change in QRS axis    US thoracentesis  Result Date: 4/18/2025  Interpreted By:  Alex Tabares, STUDY: US THORACENTESIS4/18/20253:15 pm   INDICATION: Signs/Symptoms:right sided pleural  effusion   COMPARISON: CXR 4/17/25   ACCESSION NUMBER(S): [id number]   ORDERING CLINICIAN: ROSEMARIE CHAVEZ   TECHNIQUE: INTERVENTIONALIST(S): Alex Tabares   CONSENT: The patient/patient's POA/next of kin was informed of the nature of the proposed procedure. The purposes, alternatives, risks, and benefits were explained and discussed. All questions were answered and consent was obtained.   SEDATION: None   MEDICATION/CONTRAST: 1% lidocaine was used to anesthetize subcutaneously.   TIME OUT: A time out was performed immediately prior to procedure start with the interventional team, correctly identifying the patient name, date of birth, MRN, procedure, anatomy (including marking of site and side), patient position, procedure consent form, relevant laboratory and imaging test results, antibiotic administration, safety precautions, and procedure-specific equipment needs.   FINDINGS: The patient was placed in the sitting position.   The pleural space was examined with grey scale ultrasound, and the most accessible fluid identified and marked for thoracentesis.   The skin was prepped and draped in usual manner. Local anesthesia with lidocaine was administered and a right-sided thoracentesis was performed.  A 5 Ghanaian One-Step Valved thoracentesis needle/catheter was then placed where marked. Approximately 1,300 mL of yellowish colored fluid was removed.  The needle/catheter was then withdrawn.   The patient tolerated the procedure well and there were no immediate complications. Specimen(s) sent to the laboratory for further evaluation, per the requesting team.       Uneventful thoracentesis, as detailed above. Right pleural space, 1,300 mL   I personally performed and/or directly supervised this study and was present for the entire procedure.   Performed and dictated at [facility].   Signed by: Alex Tabares 4/18/2025 3:15 PM Dictation workstation:   [id number]    Transthoracic Echo (TTE)  Complete  Result Date: 4/18/2025   [facility], [address] [place] [address]              Tel [phone] and Fax [phone] TRANSTHORACIC ECHOCARDIOGRAM REPORT  Patient Name:       PHOENIX KEVIN SUSAN   Reading Physician:    [id number] Lita June DO Study Date:         4/18/2025           Ordering Provider:    [id number] LITA JUNE MRN/PID:            [id number]            Fellow: Accession#:         [id number]        Nurse: Date of Birth/Age:  11/22/1934 / 89     Sonographer:          Maria Fernanda Stockton RDCS                     years Gender assigned at  M                   Additional Staff: Birth: Height:             160.02 cm           Admit Date:           4/17/2025 Weight:             77.11 kg            Admission Status:     Inpatient -                                                               Routine BSA / BMI:          1.80 m2 / 30.11     Encounter#:           [id number]                     kg/m2 Blood Pressure:     131/78 mmHg         Department Location:  [facility] Non                                                               Invasive Study Type:    TRANSTHORACIC ECHO (TTE) COMPLETE Diagnosis/ICD: Heart failure, unspecified-I50.9; Abnormal electrocardiogram                [ECG] [EKG]-R94.31 Indication:    CHF, abn EKG, afib CPT Code:      Echo Complete w Full Doppler-[id number] Patient History: Pertinent History: HTN and A-Fib. Atrial flutter, bradycardia, unstable angina,                    NSTEMI. Study Detail: The following Echo studies were performed: 2D, M-Mode, Doppler and               color flow. Unable to obtain subcostal and poor subcostal window               view.  PHYSICIAN INTERPRETATION: Left Ventricle: Left ventricular ejection fraction is normal, calculated by Rodriguez's biplane at 60%. There are no regional left ventricular wall motion abnormalities.  The left ventricular cavity size is normal. There is mildly increased septal and normal posterior left ventricular wall thickness. There is left ventricular concentric remodeling. Left ventricular diastolic filling cannot be determined due to atrial fibrillation/flutter. Left Atrium: The left atrial size is severely dilated. Right Ventricle: The right ventricle is normal in size. There is normal right ventricular global systolic function. Right Atrium: The right atrium is severely dilated. Aortic Valve: The aortic valve is trileaflet. The aortic valve dimensionless index is 0.48. There is no evidence of aortic valve regurgitation. The peak instantaneous gradient of the aortic valve is 21 mmHg. The mean gradient of the aortic valve is 12 mmHg. Mitral Valve: The mitral valve is normal in structure. The mitral valve spectral Doppler A-wave is absent, consistent with atrial fibrillation. There is trace mitral valve regurgitation. Tricuspid Valve: The tricuspid valve is structurally normal. There is trace tricuspid regurgitation. The Doppler estimated RVSP is mildly elevated at 46.4 mmHg. Pulmonic Valve: The pulmonic valve is structurally normal. There is no indication of pulmonic valve regurgitation. Pericardium: Small to moderate pericardial effusion. Aorta: The aortic root is normal. Systemic Veins: The inferior vena cava was not well visualized. In comparison to the previous echocardiogram(s): Compared with study dated 6/28/2023, pericardial effusion has increased.  CONCLUSIONS:  1. Left ventricular ejection fraction is normal, calculated by Rodriguez's biplane at 60%.  2. The left atrial size is severely dilated.  3. The right atrium is severely dilated.  4. Small to moderate pericardial effusion.  5. Absent A-wave on MV spectral Doppler tracing, consistent with atrial fibrillation.  6. Mildly elevated right ventricular systolic pressure.  7. Compared with study dated 6/28/2023, pericardial effusion has increased.  QUANTITATIVE DATA SUMMARY:  2D MEASUREMENTS:          Normal Ranges: LAs:             5.77 cm  (2.7-4.0cm) IVSd:            1.22 cm  (0.6-1.1cm) LVPWd:           0.98 cm  (0.6-1.1cm) LVIDd:           4.29 cm  (3.9-5.9cm) LVIDs:           2.89 cm LV Mass Index:   90 g/m2 LVEDV Index:     43 ml/m2 LV % FS          32.7 %  LEFT ATRIUM:                  Normal Ranges: LA Vol A4C:        141.3 ml   (22+/-6mL/m2) LA Vol A2C:        156.9 ml LA Vol BP:         159.1 ml LA Vol Index A4C:  78.3ml/m2 LA Vol Index A2C:  86.9 ml/m2 LA Vol Index BP:   88.1 ml/m2 LA Area A4C:       36.7 cm2 LA Area A2C:       36.2 cm2 LA Major Axis A4C: 8.1 cm LA Major Axis A2C: 7.1 cm LA Volume Index:   88.1 ml/m2 LA Vol A4C:        134.4 ml LA Vol A2C:        147.7 ml LA Vol Index BSA:  78.2 ml/m2  RIGHT ATRIUM:                 Normal Ranges: RA Vol A4C:        113.8 ml   (8.3-19.5ml) RA Vol Index A4C:  63.1 ml/m2 RA Area A4C:       30.4 cm2 RA Major Axis A4C: 6.9 cm  M-MODE MEASUREMENTS:         Normal Ranges: LAs:                 6.23 cm (2.7-4.0cm)  AORTA MEASUREMENTS:         Normal Ranges: Ao Sinus, d:        3.10 cm (2.1-3.5cm) Ao STJ, d:          2.50 cm (1.7-3.4cm) Asc Ao, d:          3.20 cm (2.1-3.4cm)  LV SYSTOLIC FUNCTION:                      Normal Ranges: EF-A4C View:    62 % (>=55%) EF-A2C View:    57 % EF-Biplane:     60 % LV EF Reported: 60 %  AORTIC VALVE:                      Normal Ranges: AoV Vmax:                2.27 m/s  (<=1.7m/s) AoV Peak P.6 mmHg (<20mmHg) AoV Mean P.1 mmHg (1.7-11.5mmHg) LVOT Max Rex:            0.94 m/s  (<=1.1m/s) AoV VTI:                 39.15 cm  (18-25cm) LVOT VTI:                18.61 cm LVOT Diameter:           2.29 cm   (1.8-2.4cm) AoV Area, VTI:           1.96 cm2  (2.5-5.5cm2) AoV Area,Vmax:           1.71 cm2  (2.5-4.5cm2) AoV Dimensionless Index: 0.48  RIGHT VENTRICLE: RV Basal 4.50 cm RV Mid   3.80 cm RV Major 6.1 cm TAPSE:   18.0 mm RV s'    0.16 m/s   TRICUSPID VALVE/RVSP:          Normal Ranges: Peak TR Velocity:     3.30 m/s Est. RA Pressure:     3 mmHg RV Syst Pressure:     46 mmHg  (< 30mmHg)  PULMONIC VALVE:          Normal Ranges: PV Accel Time:  63 msec  (>120ms) PV Max Rex:     1.2 m/s  (0.6-0.9m/s) PV Max P.1 mmHg  AORTA: Asc Ao Diam 3.22 cm  [id number] Trinoferoz June DO Electronically signed on 2025 at 12:33:15 PM  ** Final **     XR chest 1 view  Result Date: 2025  Interpreted By:  Rainer Wyatt, STUDY: XR CHEST 1 VIEW;  2025 11:19 am   INDICATION: Signs/Symptoms:S/P Thora.   COMPARISON: Chest x-ray from 2025. CT scan chest from 2024.   ACCESSION NUMBER(S): [id number]   ORDERING CLINICIAN: DIEGO JONES   TECHNIQUE: Single AP portable view of the chest was obtained.   FINDINGS: MEDIASTINUM/ LUNGS/ MAGNO: Suboptimal level of inspiration. There is still a small to moderate-sized right pleural effusion, improved. There is persistent mild atelectasis at the base of the aerated right lung. No left pleural effusion. Scant atelectasis at the retrocardiac left lung base. Cardiomegaly without vascular congestion. No pneumothorax. No tracheal deviation. No abnormal hilar fullness or gross mass on either side.   BONES: No lytic or blastic destructive bone lesion.   UPPER ABDOMEN: Grossly intact.       As above. Status post right thoracentesis. No pneumothorax.   MACRO: None   Signed by: Rainer Wyatt 2025 11:24 AM Dictation workstation:   [id number]    ECG 12 lead  Result Date: 2025  Atrial fibrillation Low voltage QRS Abnormal ECG When compared with ECG of 2024 11:12, No significant change was found See ED provider note for full interpretation and clinical correlation Confirmed by Lisa Fisher ([id number]) on 2025 2:02:32 PM    XR chest 1 view  Result Date: 2025  Interpreted By:  Denis Morales, STUDY: XR CHEST 1 VIEW  2025 12:50 pm   INDICATION: Signs/Symptoms:sob   COMPARISON: 2024    ACCESSION NUMBER(S): [id number]   ORDERING CLINICIAN: MARINA CASTANO   TECHNIQUE: A single AP portable radiograph of the chest was obtained.   FINDINGS: Multiple cardiac monitoring leads are seen over the chest.  A moderate-sized right-sided pleural effusion is present. Right basilar airspace consolidation is seen and may represent atelectasis and/or pneumonia. No pneumothorax is identified. The cardiac silhouette is within normal limits for size.       Right-sided pleural effusion and airspace consolidation, as above. Clinical correlation and continued follow-up until clearing is recommended.   MACRO: None.   Signed by: Denis Morales 4/17/2025 1:33 PM Dictation workstation:   [id number]         Hospital Course   89-year-old gentleman with history of chronic diastolic heart failure and chronic atrial fibrillation with rapid ventricular response, chronic right pleural effusion was admitted with hypoxic respiratory failure and atrial fibrillation with rapid ventricular response and hypotension.  He underwent right thoracocentesis and 1300 cc of pleural fluid was removed, pleural fluid analysis is suggestive of an exudate however pH is 7.7 and no significant white cells were seen.  And there is no evidence of any pneumonic infiltrate.  He was also diuresed with IV Lasix.  On admission he was significantly hypotensive therefore his beta-blocker and Cardizem were held and subsequently both beta-blockers and Cardizem have been resumed and his heart rate has improved and he remains in atrial fibrillation with controlled ventricular rate now.  His breathing seems to have improved significantly and leg edema has completely resolved and blood pressure normalized.  He is ready for discharge home today to be followed by his pulmonologist Dr. Tolentino follow-up with cardiology Dr. ARRIAGA follow-up with PCP in 1 week.   1.  Acute on chronic diastolic heart failure with increasing right edema and moderate-sized right pleural  effusion and acute hypoxic respiratory failure  Chest x-ray consistent with large right pleural effusion, had thoracocentesis on April 18, 2025 and 1300 cc of pleural fluid was removed, pleural fluid cytology analysis is pending.  IV diuretics Lasix 40 mg IV twice daily, cardiology input appreciated.  Echocardiogram reviewed consistent with diastolic dysfunction.  Significant improvement, leg edema resolved diuresed good, l reduced Lasix to 40 mg po daily.  Redo oxygen desaturation studies to see if he would require oxygen at home.     2.  Chronic atrial fibrillation with slow ventricular response and blood pressures were rather low on admission therefore his diltiazem and metoprolol is being held, however today heart rate is around 100 to110 bpm and blood pressures have improved therefore  resumed metoprolol.  As well as seen by cardiology and patient  received IV digoxin other day.  Patient seen by Dr. Moeller today and patient is restarted on Cardizem CD to 240 mg daily.  Blood pressures stable now.  Patient has been on Coumadin and INR is 3.8, on Coumadin 5 mg daily     3.  Diabetes mellitus type 2 stable, continue with current regime of metformin and sliding scale insulin if necessary, most recent A1c was 6.4.     4.Previous history of NSTEMI and LAD PCI.     Reviewed all labs and imaging studies discussed the plan of treatment with patient and his wife on the day,  and she is updated with patient's condition other day.     Discharge home today.     I spent 35 minutes in the professional and overall care of this patient.      Pertinent Physical Exam At Time of Discharge  GENERAL:  Alert, mild distress, cooperative  SKIN:  Skin color, texture, turgor normal. No rashes or lesions.  OROPHARYNX:  Lips, mucosa, and tongue are normal.Teeth and gums, normal. Oropharynx normal.  NECK:  No jugulovenous distention, No carotid bruits, Carotid pulse normal contour, Supple  LUNGS: Improved air exchange, status post right  "thoracocentesis and 1300 cc of pleural fluid was removed on 4/18/25  CARDIAC: Atrial fibrillation with controlled rate now, normal S1 and S2; no rubs,  or gallops, 2/6 systolic ejection murmur left sternal border, CHF improving  ABDOMEN:  Abdomen soft, non-tender, BS normal, No masses or organomegaly  EXTREMETIES:  Extremities normal, no deformities, trace edema,no clubbing   NEURO:  Alert, oriented X 3, Non-focal. PULSES:  2+ radial, 2+ carotid        Last Recorded Vitals  Blood pressure 132/77, pulse 95, temperature 36.6 °C (97.8 °F), temperature source Axillary, resp. rate 16, height 1.6 m (5' 3\"), weight 77.1 kg (170 lb), SpO2 95%.  Intake/Output last 3 Shifts:  I/O last 3 completed shifts:  In: 240 (3.1 mL/kg) [P.O.:240]  Out: 850 (11 mL/kg) [Urine:850 (0.3 mL/kg/hr)]  Weight: 77.1 kg            Outpatient Follow-Up  Future Appointments   Date Time Provider Department Center   4/25/2025 To Be Determined Maylin Joe RN [facility]   4/28/2025 To Be Determined Shantell Hudson OT [facility]   4/28/2025 To Be Determined Tomasa Schwarz, PT [facility]   5/13/2025 10:00 AM Stevenson Tolentino MD MPH [id number] [facility]   6/18/2025 10:00 AM Jovanny Ruggiero MD [id number] [facility]   7/30/2025 11:00 AM Trice June DO [id number] [facility]   11/12/2025  9:00 AM Trice June DO [id number] [facility]         Chon Lanza MD  "

## 2025-04-25 ENCOUNTER — HOME CARE VISIT (OUTPATIENT)
Dept: HOME HEALTH SERVICES | Facility: HOME HEALTH | Age: OVER 89
End: 2025-04-25
Payer: MEDICARE

## 2025-04-25 ENCOUNTER — PATIENT OUTREACH (OUTPATIENT)
Dept: PRIMARY CARE | Facility: CLINIC | Age: OVER 89
End: 2025-04-25
Payer: MEDICARE

## 2025-04-25 ENCOUNTER — PATIENT OUTREACH (OUTPATIENT)
Dept: CARE COORDINATION | Age: OVER 89
End: 2025-04-25
Payer: MEDICARE

## 2025-04-25 ENCOUNTER — TELEPHONE (OUTPATIENT)
Dept: PRIMARY CARE | Facility: CLINIC | Age: OVER 89
End: 2025-04-25
Payer: MEDICARE

## 2025-04-25 DIAGNOSIS — I48.91 ATRIAL FIBRILLATION, UNSPECIFIED TYPE (MULTI): ICD-10-CM

## 2025-04-25 LAB
FUNGUS SPEC CULT: NORMAL
FUNGUS SPEC FUNGUS STN: NORMAL

## 2025-04-25 PROCEDURE — 169592 NO-PAY CLAIM PROCEDURE

## 2025-04-25 PROCEDURE — G0299 HHS/HOSPICE OF RN EA 15 MIN: HCPCS | Mod: HHH

## 2025-04-25 SDOH — HEALTH STABILITY: MENTAL HEALTH: SMOKING IN HOME: 0

## 2025-04-25 SDOH — ECONOMIC STABILITY: FOOD INSECURITY: MEALS PER DAY: 3

## 2025-04-25 ASSESSMENT — ENCOUNTER SYMPTOMS
APPETITE LEVEL: FAIR
PAIN LOCATION: GENERALIZED
PERSON REPORTING PAIN: PATIENT
LOWEST PAIN SEVERITY IN PAST 24 HOURS: 0/10
CHANGE IN APPETITE: UNCHANGED
PAIN LOCATION - PAIN SEVERITY: 0/10
HIGHEST PAIN SEVERITY IN PAST 24 HOURS: 0/10
PAIN SEVERITY GOAL: 0/10
MUSCLE WEAKNESS: 1
SUBJECTIVE PAIN PROGRESSION: UNCHANGED
PAIN: 1

## 2025-04-25 ASSESSMENT — ACTIVITIES OF DAILY LIVING (ADL)
ENTERING_EXITING_HOME: MODERATE ASSIST
AMBULATION ASSISTANCE: STAND BY ASSIST
OASIS_M1830: 03
CURRENT_FUNCTION: STAND BY ASSIST

## 2025-04-25 NOTE — PROGRESS NOTES
TCM completed 04/25/25   Discharge Facility:  [facility]  Discharge Diagnosis: Acute hypoxic respiratory failure  Acute diastolic heart failure  Bilateral pleural effusion right greater than left  Right thoracocentesis and 1300 cc of pleural fluid removed, fluid analysis suggestive of transudate  Atrial fibrillation with  variable ventricular response  Diabetes mellitus type 2  Admission Date: 4/17/25  Discharge Date: 4/23/25    PCP Appointment Date: 4/30/25  Specialist Appointment Date:   Cardiology-  6/18/25  Pulmonology- 5/13/25    * Declined [facility]     Hospital Encounter and Summary Linked: Yes  ED to Hosp-Admission (Discharged) with Chon Lanza MD (04/17/2025)                        --See discharge assessment below for further details--    Wrap Up  Wrap Up Additional Comments: TCM initial outreach completed successfully. Spoke with the patients wife Amy who states the patient is now home and doing better today. Amy denied any acute changes or concerns in the patients condition since discharge. Amy states the patients home care nurse was just out and obtained the patients INR that was due today. Amy denied any needs from TCM at this time. Amy confirmed they received the patients discharge summary and have all medications needed in the home. Patient has all follow up appts scheduled and confirmed they plan to keep all appts. TCM phone number was provided to the patients spouse and encouraged them to reach out with any non-emergent questions/concerns. Amy verbalized her understanding and states they will reach out if needed. (4/25/2025 12:10 PM)  Call End Time: 1206 (4/25/2025 12:10 PM)    Engagement  Call Start Time: 1204 (4/25/2025 12:10 PM)    Medications  Medications reviewed with patient/caregiver?: No (Spoke with the patients wife Amy-  nurse reviewed medications w/pt.) (4/25/2025 12:10 PM)  Is the patient having any side effects they believe may be caused by any medication  additions or changes?: No (4/25/2025 12:10 PM)  Does the patient have all medications ordered at discharge?: Yes (4/25/2025 12:10 PM)  Care Management Interventions: No intervention needed (4/25/2025 12:10 PM)  Prescription Comments: Start taking: DilTIAZem  mg 24 hr capsule Commonly known as: Cardizem CD- Take 1 capsule (240 mg) by mouth once daily. Replaces: dilTIAZem  mg 24 hr capsule.   CHANGE how you take:  Doxazosin (Cardura),   Ramipril (Altace),   Warfarin (Coumadin).    STOP taking:  Cyclobenzaprine 5 mg tablet (Flexeril) and  DilTIAZem  mg 24 hr capsule (Tiazac). (4/25/2025 12:10 PM)  Is the patient taking all medications as directed (includes completed medication regime)?: Yes (4/25/2025 12:10 PM)  Care Management Interventions: Provided patient education (4/25/2025 12:10 PM)  Medication Comments: Medications received from [facility] (4/25/2025 12:10 PM)    Appointments  Does the patient have a primary care provider?: Yes (4/25/2025 12:10 PM)  Care Management Interventions: Verified appointment date/time/provider (4/25/2025 12:10 PM)  Has the patient kept scheduled appointments due by today?: Not applicable (4/25/2025 12:10 PM)  Care Management Interventions: Advised patient to keep appointment; Educated on importance of keeping appointment (4/25/2025 12:10 PM)    Self Management  What is the home health agency?: [facility] (4/25/2025 12:10 PM)  Has home health visited the patient within 72 hours of discharge?: Yes (4/25/2025 12:10 PM)  What Durable Medical Equipment (DME) was ordered?: Oxygen therapy for home (4/25/2025 12:10 PM)  Has all Durable Medical Equipment (DME) been delivered?: Yes (4/25/2025 12:10 PM)    Patient Teaching  Does the patient have access to their discharge instructions?: Yes (4/25/2025 12:10 PM)  Care Management Interventions: Reviewed instructions with patient (4/25/2025 12:10 PM)  What is the patient's perception of their health  status since discharge?: Improving (4/25/2025 12:10 PM)  Is the patient/caregiver able to teach back the hierarchy of who to call/visit for symptoms/problems? PCP, Specialist, Home Health nurse, Urgent Care, ED, 911: Yes (4/25/2025 12:10 PM)  Patient/Caregiver Education Comments: Discussed: INR due to Friday 4/25. Done today by home health nurse per wife Amy. (4/25/2025 12:10 PM)

## 2025-04-26 VITALS
DIASTOLIC BLOOD PRESSURE: 60 MMHG | TEMPERATURE: 98.6 F | RESPIRATION RATE: 19 BRPM | SYSTOLIC BLOOD PRESSURE: 108 MMHG | OXYGEN SATURATION: 97 % | HEART RATE: 56 BPM

## 2025-04-26 SDOH — ECONOMIC STABILITY: HOUSING INSECURITY: EVIDENCE OF SMOKING MATERIAL: 0

## 2025-04-26 ASSESSMENT — ENCOUNTER SYMPTOMS
PAIN LOCATION - PAIN QUALITY: SORE
PAIN LOCATION - PAIN FREQUENCY: INTERMITTENT

## 2025-04-27 LAB
ATRIAL RATE: 119 BPM
Q ONSET: 220 MS
QRS COUNT: 21 BEATS
QRS DURATION: 84 MS
QT INTERVAL: 280 MS
QTC CALCULATION(BAZETT): 412 MS
QTC FREDERICIA: 362 MS
R AXIS: 24 DEGREES
T AXIS: 5 DEGREES
T OFFSET: 360 MS
VENTRICULAR RATE: 130 BPM

## 2025-04-28 ENCOUNTER — HOME CARE VISIT (OUTPATIENT)
Dept: HOME HEALTH SERVICES | Facility: HOME HEALTH | Age: OVER 89
End: 2025-04-28
Payer: MEDICARE

## 2025-04-28 VITALS
HEART RATE: 56 BPM | DIASTOLIC BLOOD PRESSURE: 50 MMHG | TEMPERATURE: 98.1 F | SYSTOLIC BLOOD PRESSURE: 108 MMHG | OXYGEN SATURATION: 91 %

## 2025-04-28 VITALS — HEART RATE: 80 BPM | OXYGEN SATURATION: 98 %

## 2025-04-28 LAB
FUNGUS SPEC CULT: NORMAL
FUNGUS SPEC FUNGUS STN: NORMAL

## 2025-04-28 PROCEDURE — G0152 HHCP-SERV OF OT,EA 15 MIN: HCPCS | Mod: HHH

## 2025-04-28 PROCEDURE — G0151 HHCP-SERV OF PT,EA 15 MIN: HCPCS | Mod: HHH

## 2025-04-28 SDOH — ECONOMIC STABILITY: HOUSING INSECURITY
HOME SAFETY: PT AND SPOUSE DIRECTED TO UH HANDBOOK FOR OXYGEN PRECAUTIONS.  INSTRUCTED TO POST OXYGEN SIGNS, CAN TEAR OUT FROM HANDBOOK TO POST.  PT IS AWARE HE IS TO USE OXYGEN WHEN ACTIVE BUT STATES IT DOES NOT MAKE SENSE TO WALK TO BATH OR KITCHEN.

## 2025-04-28 SDOH — ECONOMIC STABILITY: HOUSING INSECURITY: EVIDENCE OF SMOKING MATERIAL: 0

## 2025-04-28 SDOH — HEALTH STABILITY: MENTAL HEALTH: SMOKING IN HOME: 0

## 2025-04-28 ASSESSMENT — ACTIVITIES OF DAILY LIVING (ADL)
AMBULATION ASSISTANCE: STAND BY ASSIST
AMBULATION_DISTANCE/DURATION_TOLERATED: 100 FEET X 1
DRESSING_UB_CURRENT_FUNCTION: SUPERVISION
BATHING_CURRENT_FUNCTION: MINIMUM ASSIST
WASHING_LB_CURRENT_FUNCTION: MINIMUM ASSIST
WASHING_UPB_CURRENT_FUNCTION: MINIMUM ASSIST
AMBULATION ASSISTANCE ON FLAT SURFACES: 1
CURRENT_FUNCTION: STAND BY ASSIST
BATHING ASSESSED: 1
DRESSING_LB_CURRENT_FUNCTION: SUPERVISION

## 2025-04-28 ASSESSMENT — ENCOUNTER SYMPTOMS
DENIES PAIN: 1
MUSCLE WEAKNESS: 1
PERSON REPORTING PAIN: PATIENT
PERSON REPORTING PAIN: PATIENT
DENIES PAIN: 1

## 2025-04-29 ENCOUNTER — HOME CARE VISIT (OUTPATIENT)
Dept: HOME HEALTH SERVICES | Facility: HOME HEALTH | Age: OVER 89
End: 2025-04-29
Payer: MEDICARE

## 2025-04-29 VITALS
RESPIRATION RATE: 19 BRPM | DIASTOLIC BLOOD PRESSURE: 60 MMHG | HEART RATE: 78 BPM | TEMPERATURE: 98.5 F | SYSTOLIC BLOOD PRESSURE: 110 MMHG | OXYGEN SATURATION: 96 %

## 2025-04-29 PROCEDURE — G0299 HHS/HOSPICE OF RN EA 15 MIN: HCPCS | Mod: HHH

## 2025-04-29 SDOH — ECONOMIC STABILITY: FOOD INSECURITY: MEALS PER DAY: 3

## 2025-04-29 SDOH — ECONOMIC STABILITY: GENERAL

## 2025-04-29 ASSESSMENT — ENCOUNTER SYMPTOMS
SUBJECTIVE PAIN PROGRESSION: UNCHANGED
PERSON REPORTING PAIN: PATIENT
PAIN SEVERITY GOAL: 0/10
MUSCLE WEAKNESS: 1
PAIN LOCATION: GENERALIZED
APPETITE LEVEL: FAIR
SHORTNESS OF BREATH: 1
CHANGE IN APPETITE: UNCHANGED
LOWEST PAIN SEVERITY IN PAST 24 HOURS: 0/10
PAIN: 1
HIGHEST PAIN SEVERITY IN PAST 24 HOURS: 0/10

## 2025-04-29 ASSESSMENT — ACTIVITIES OF DAILY LIVING (ADL)
AMBULATION ASSISTANCE: STAND BY ASSIST
CURRENT_FUNCTION: STAND BY ASSIST
MONEY MANAGEMENT (EXPENSES/BILLS): INDEPENDENT

## 2025-04-30 ENCOUNTER — APPOINTMENT (OUTPATIENT)
Dept: PRIMARY CARE | Facility: CLINIC | Age: OVER 89
End: 2025-04-30
Payer: MEDICARE

## 2025-04-30 VITALS
BODY MASS INDEX: 28.88 KG/M2 | HEART RATE: 81 BPM | OXYGEN SATURATION: 94 % | DIASTOLIC BLOOD PRESSURE: 68 MMHG | WEIGHT: 163 LBS | HEIGHT: 63 IN | SYSTOLIC BLOOD PRESSURE: 104 MMHG

## 2025-04-30 DIAGNOSIS — Z79.01 CHRONIC ANTICOAGULATION: Primary | ICD-10-CM

## 2025-04-30 DIAGNOSIS — I50.32 CHRONIC HEART FAILURE WITH PRESERVED EJECTION FRACTION (HFPEF): ICD-10-CM

## 2025-04-30 DIAGNOSIS — I10 BENIGN ESSENTIAL HYPERTENSION: ICD-10-CM

## 2025-04-30 DIAGNOSIS — Z09 HOSPITAL DISCHARGE FOLLOW-UP: ICD-10-CM

## 2025-04-30 DIAGNOSIS — I48.19 PERSISTENT ATRIAL FIBRILLATION (MULTI): ICD-10-CM

## 2025-04-30 LAB
ACID FAST STN SPEC: NORMAL
MYCOBACTERIUM SPEC CULT: NORMAL

## 2025-04-30 PROCEDURE — 99495 TRANSJ CARE MGMT MOD F2F 14D: CPT | Performed by: SPECIALIST

## 2025-04-30 PROCEDURE — 3074F SYST BP LT 130 MM HG: CPT | Performed by: SPECIALIST

## 2025-04-30 PROCEDURE — 1126F AMNT PAIN NOTED NONE PRSNT: CPT | Performed by: SPECIALIST

## 2025-04-30 PROCEDURE — 3078F DIAST BP <80 MM HG: CPT | Performed by: SPECIALIST

## 2025-04-30 PROCEDURE — 1111F DSCHRG MED/CURRENT MED MERGE: CPT | Performed by: SPECIALIST

## 2025-04-30 PROCEDURE — 1159F MED LIST DOCD IN RCRD: CPT | Performed by: SPECIALIST

## 2025-04-30 ASSESSMENT — PATIENT HEALTH QUESTIONNAIRE - PHQ9
SUM OF ALL RESPONSES TO PHQ9 QUESTIONS 1 AND 2: 0
2. FEELING DOWN, DEPRESSED OR HOPELESS: NOT AT ALL
1. LITTLE INTEREST OR PLEASURE IN DOING THINGS: NOT AT ALL

## 2025-04-30 ASSESSMENT — ENCOUNTER SYMPTOMS
OCCASIONAL FEELINGS OF UNSTEADINESS: 1
LOSS OF SENSATION IN FEET: 0
DEPRESSION: 0

## 2025-04-30 ASSESSMENT — PAIN SCALES - GENERAL: PAINLEVEL_OUTOF10: 0-NO PAIN

## 2025-04-30 NOTE — ASSESSMENT & PLAN NOTE
INR Friday was 3, would like to get repeat INR   Doesn't feel he is able to get to lab, will order INR for home care to draw  Orders:    Protime-INR; Future

## 2025-04-30 NOTE — PROGRESS NOTES
Subjective   Patient ID: Ariel Rodríguez is a 89 y.o. male who presents for Follow-up (Hospital discharge).  HPI    89 yo male Pmhx sig for Afib (on Warfarin), CAD, Rheumatic Fever, DM, Hyperlipidemia, BPH, Colon Polyps and Spinal stenosis here today for hospital discharge follow-up accompanied by his wife     Admitted to [facility] after presenting to ER c/o of increased shortness of breath.  CXR with worsening pleural effusion and consolidation.  .  Is S/p  US guided R Thoracentesis 1300 mL staw-yellow pleural fluid removed   Getting PT   OT comes tomorrow  On 2 L has portable 02  Cytology no malignant cells  Fluid suggested Exudative but Ph 7.7    Sleeping in recliner because of his back not due to shortness of breath  INR was 3 on Friday  He is too tired to walk to lab today  Will ask home care to get INR    Doxazosin on hold due to BP  Previously took 360 mg Dilt ER now on 240 mg    Nurse comes once a week    Allergies[1]   Current Outpatient Medications   Medication Instructions    acetaminophen (Tylenol) 500 mg tablet 1 tablet, Every 8 hours PRN    aspirin 81 mg EC tablet 1 tablet, Daily    atorvastatin (LIPITOR) 80 mg, oral, Daily    cholecalciferol (Vitamin D-3) 50 mcg (2,000 unit) capsule 1 capsule, Daily    dilTIAZem CD (CARDIZEM CD) 240 mg, oral, Daily    doxazosin (Cardura) 4 mg tablet 1.5 tablets, Daily    furosemide (LASIX) 40 mg, oral, Daily    glucosamine/chondr nelson A sod (OSTEO BI-FLEX ORAL) 2 tablets, Daily    loratadine (Claritin Liqui-Gel) 10 mg capsule 1 capsule, Daily PRN    metFORMIN XR (GLUCOPHAGE-XR) 500 mg, oral, Daily    metOLazone (Zaroxolyn) 2.5 mg tablet TAKE 1 TABLET(2.5 MG) BY MOUTH 1 TIME EVERY WEEK    metoprolol tartrate (LOPRESSOR) 25 mg, oral, 2 times daily    multivitamin with minerals iron-free (Centrum Silver) 1 tablet, Daily    nitroglycerin (NITROSTAT) 0.4 mg, sublingual, Every 5 min PRN    oxygen DME/Hospice (O2) 2 L/min, As needed    pantoprazole (ProtoNix) 40 mg EC  "tablet TAKE 1 TABLET BY MOUTH EVERY MORNING 30 MINUTES BEFORE BREAKFAST    potassium chloride CR 20 mEq ER tablet TAKE 1 TABLET(20 MEQ) BY MOUTH DAILY    ramipril (Altace) 5 mg capsule 1 capsule, Daily    warfarin (Coumadin) 5 mg tablet Take 1 tablet by mouth daily Monday and Tuesday and 1/2 tab all other days or as directed        Review of Systems  Constitutional  No fatigue, no fevers, no chills  HEENT:  No headaches, no dizziness,   Cardiovascular:  No chest pain, no palpitations, no shortness of breath with exertion ((3 steps to go outside),   Respiratory:  No cough, No shortness of breath at rest, no orthopnea using recliners  GI:  No abdominal pain, no nausea, no vomiting, no diarrhea, still constipation   Ext:  No leg swelling using compression hose      Physical Exam  /68 (BP Location: Left arm, Patient Position: Sitting, BP Cuff Size: Adult)   Pulse 81   Ht 1.6 m (5' 3\")   Wt 73.9 kg (163 lb)   SpO2 94%   BMI 28.87 kg/m²   Pox 97% and HR 64 /54   General: Well-appearing  M in no acute distress, well nourished, well hydrated  Head:  Normocephalic, atraumatic  Eyes:  Anicteric sclera, pupils equal    Oral:     Not examined due to pandemic)  Neck:   Supple  Cor:      Irregularly irregular, 2/6 murmur  Lungs:   Clear to auscultation b/l, no wheezes, no rhonchi, no crackles, no accessory respiratory muscle use  Ext:            No lower extremity edema, no palpable cords      Assessment/Plan   Assessment & Plan  Chronic anticoagulation  INR Friday was 3, would like to get repeat INR   Doesn't feel he is able to get to lab, will order INR for home care to draw  Orders:    Protime-INR; Future    Hospital discharge follow-up  S/p hospitalization for hypoxic respiratory failure  S/p R Thoracentesis 1300 mL no malignant cells  Sleeping in recliner but said because of his back not dyspnea (said needs new mattress)  Clinically improved  Using oxygen to move around   Plans follow-up with cardiology and " pulmonary        Persistent atrial fibrillation (Multi)  Rate controlled on decreased dose of Cardizem  (used to take 360)  Chronic anticoagulation with warfarin       Benign essential hypertension  Discharge medication list recommended holding Doxazosin 6 mg (has 4 mg tabs) and Altace 5 mg until sees me, continue to hold  Recommend home blood pressure monitoring  To let me know if home SBP above 130           Chronic heart failure with preserved ejection fraction (HFpEF)  Continue diuretic  Sleeping in recliner because of his back (said he needs new mattress)  Using oxygen to move around  Plans to follow-up with Cardiology          Trice June DO          [1] No Known Allergies

## 2025-05-01 ENCOUNTER — HOME CARE VISIT (OUTPATIENT)
Dept: HOME HEALTH SERVICES | Facility: HOME HEALTH | Age: OVER 89
End: 2025-05-01
Payer: MEDICARE

## 2025-05-01 VITALS
TEMPERATURE: 97.2 F | SYSTOLIC BLOOD PRESSURE: 123 MMHG | HEART RATE: 56 BPM | RESPIRATION RATE: 16 BRPM | DIASTOLIC BLOOD PRESSURE: 65 MMHG | OXYGEN SATURATION: 96 %

## 2025-05-01 PROCEDURE — G0158 HHC OT ASSISTANT EA 15: HCPCS | Mod: CO,HHH

## 2025-05-01 ASSESSMENT — ENCOUNTER SYMPTOMS
DENIES PAIN: 1
PERSON REPORTING PAIN: PATIENT

## 2025-05-02 ENCOUNTER — HOME CARE VISIT (OUTPATIENT)
Dept: HOME HEALTH SERVICES | Facility: HOME HEALTH | Age: OVER 89
End: 2025-05-02
Payer: MEDICARE

## 2025-05-02 VITALS — DIASTOLIC BLOOD PRESSURE: 60 MMHG | HEART RATE: 76 BPM | OXYGEN SATURATION: 95 % | SYSTOLIC BLOOD PRESSURE: 106 MMHG

## 2025-05-02 DIAGNOSIS — Z79.01 CHRONIC ANTICOAGULATION: ICD-10-CM

## 2025-05-02 DIAGNOSIS — I48.91 ATRIAL FIBRILLATION, UNSPECIFIED TYPE (MULTI): ICD-10-CM

## 2025-05-02 PROCEDURE — G0151 HHCP-SERV OF PT,EA 15 MIN: HCPCS | Mod: HHH

## 2025-05-02 SDOH — ECONOMIC STABILITY: HOUSING INSECURITY: HOME SAFETY: PATIENT INFORMED OF NO SMOKING SIGN IN UH HANDBOOK

## 2025-05-02 SDOH — ECONOMIC STABILITY: HOUSING INSECURITY: EVIDENCE OF SMOKING MATERIAL: 0

## 2025-05-02 SDOH — HEALTH STABILITY: MENTAL HEALTH: SMOKING IN HOME: 0

## 2025-05-05 LAB
FUNGUS SPEC CULT: NORMAL
FUNGUS SPEC FUNGUS STN: NORMAL

## 2025-05-06 ENCOUNTER — HOME CARE VISIT (OUTPATIENT)
Dept: HOME HEALTH SERVICES | Facility: HOME HEALTH | Age: OVER 89
End: 2025-05-06
Payer: MEDICARE

## 2025-05-06 VITALS
HEART RATE: 58 BPM | TEMPERATURE: 97.6 F | SYSTOLIC BLOOD PRESSURE: 106 MMHG | DIASTOLIC BLOOD PRESSURE: 50 MMHG | OXYGEN SATURATION: 99 %

## 2025-05-06 VITALS
DIASTOLIC BLOOD PRESSURE: 65 MMHG | RESPIRATION RATE: 16 BRPM | TEMPERATURE: 97.5 F | OXYGEN SATURATION: 98 % | HEART RATE: 75 BPM | SYSTOLIC BLOOD PRESSURE: 126 MMHG

## 2025-05-06 DIAGNOSIS — E11.9 TYPE 2 DIABETES MELLITUS WITHOUT COMPLICATION, WITHOUT LONG-TERM CURRENT USE OF INSULIN: Primary | ICD-10-CM

## 2025-05-06 DIAGNOSIS — I50.82 BIVENTRICULAR CONGESTIVE HEART FAILURE: ICD-10-CM

## 2025-05-06 PROCEDURE — G0151 HHCP-SERV OF PT,EA 15 MIN: HCPCS | Mod: HHH

## 2025-05-06 PROCEDURE — G0158 HHC OT ASSISTANT EA 15: HCPCS | Mod: CO,HHH

## 2025-05-06 ASSESSMENT — ENCOUNTER SYMPTOMS
PERSON REPORTING PAIN: PATIENT
DENIES PAIN: 1
DENIES PAIN: 1
PERSON REPORTING PAIN: PATIENT

## 2025-05-07 ENCOUNTER — HOME CARE VISIT (OUTPATIENT)
Dept: HOME HEALTH SERVICES | Facility: HOME HEALTH | Age: OVER 89
End: 2025-05-07
Payer: MEDICARE

## 2025-05-07 ENCOUNTER — APPOINTMENT (OUTPATIENT)
Dept: PRIMARY CARE | Facility: CLINIC | Age: OVER 89
End: 2025-05-07
Payer: MEDICARE

## 2025-05-07 VITALS
RESPIRATION RATE: 19 BRPM | HEART RATE: 78 BPM | DIASTOLIC BLOOD PRESSURE: 70 MMHG | OXYGEN SATURATION: 97 % | TEMPERATURE: 98.6 F | SYSTOLIC BLOOD PRESSURE: 116 MMHG

## 2025-05-07 LAB
ACID FAST STN SPEC: NORMAL
MYCOBACTERIUM SPEC CULT: NORMAL

## 2025-05-07 PROCEDURE — G0299 HHS/HOSPICE OF RN EA 15 MIN: HCPCS | Mod: HHH

## 2025-05-07 SDOH — ECONOMIC STABILITY: GENERAL

## 2025-05-07 SDOH — ECONOMIC STABILITY: HOUSING INSECURITY: EVIDENCE OF SMOKING MATERIAL: 0

## 2025-05-07 SDOH — HEALTH STABILITY: MENTAL HEALTH: SMOKING IN HOME: 0

## 2025-05-07 SDOH — ECONOMIC STABILITY: FOOD INSECURITY: MEALS PER DAY: 3

## 2025-05-07 ASSESSMENT — ENCOUNTER SYMPTOMS
SUBJECTIVE PAIN PROGRESSION: UNCHANGED
PAIN LOCATION: GENERALIZED
PAIN SEVERITY GOAL: 0/10
PAIN LOCATION - PAIN SEVERITY: 0/10
BOWEL INCONTINENCE: 1
LOWEST PAIN SEVERITY IN PAST 24 HOURS: 0/10
PAIN LOCATION - PAIN FREQUENCY: INTERMITTENT
PERSON REPORTING PAIN: PATIENT
HIGHEST PAIN SEVERITY IN PAST 24 HOURS: 0/10
APPETITE LEVEL: FAIR
LAST BOWEL MOVEMENT: 67332
PAIN: 1
CHANGE IN APPETITE: UNCHANGED
PAIN LOCATION - PAIN QUALITY: ACHY
MUSCLE WEAKNESS: 1

## 2025-05-07 ASSESSMENT — ACTIVITIES OF DAILY LIVING (ADL)
CURRENT_FUNCTION: STAND BY ASSIST
MONEY MANAGEMENT (EXPENSES/BILLS): NEEDS ASSISTANCE
AMBULATION ASSISTANCE: STAND BY ASSIST

## 2025-05-08 ENCOUNTER — HOME CARE VISIT (OUTPATIENT)
Dept: HOME HEALTH SERVICES | Facility: HOME HEALTH | Age: OVER 89
End: 2025-05-08
Payer: MEDICARE

## 2025-05-08 VITALS — OXYGEN SATURATION: 96 % | HEART RATE: 71 BPM

## 2025-05-08 VITALS
DIASTOLIC BLOOD PRESSURE: 64 MMHG | SYSTOLIC BLOOD PRESSURE: 125 MMHG | OXYGEN SATURATION: 98 % | HEART RATE: 65 BPM | TEMPERATURE: 97.2 F | RESPIRATION RATE: 16 BRPM

## 2025-05-08 DIAGNOSIS — E11.9 TYPE 2 DIABETES MELLITUS WITHOUT COMPLICATION, WITHOUT LONG-TERM CURRENT USE OF INSULIN: ICD-10-CM

## 2025-05-08 DIAGNOSIS — I10 BENIGN ESSENTIAL HYPERTENSION: ICD-10-CM

## 2025-05-08 PROCEDURE — G0151 HHCP-SERV OF PT,EA 15 MIN: HCPCS | Mod: HHH

## 2025-05-08 PROCEDURE — G0158 HHC OT ASSISTANT EA 15: HCPCS | Mod: CO,HHH

## 2025-05-08 RX ORDER — METFORMIN HYDROCHLORIDE 500 MG/1
500 TABLET, EXTENDED RELEASE ORAL DAILY
Qty: 90 TABLET | Refills: 1 | Status: SHIPPED | OUTPATIENT
Start: 2025-05-08

## 2025-05-08 RX ORDER — POTASSIUM CHLORIDE 20 MEQ/1
TABLET, EXTENDED RELEASE ORAL
Qty: 90 TABLET | Refills: 1 | Status: SHIPPED | OUTPATIENT
Start: 2025-05-08

## 2025-05-08 RX ORDER — WARFARIN SODIUM 5 MG/1
TABLET ORAL
Qty: 30 TABLET | Refills: 1 | Status: SHIPPED | OUTPATIENT
Start: 2025-05-08

## 2025-05-08 SDOH — HEALTH STABILITY: PHYSICAL HEALTH: EXERCISE COMMENTS: PT I IN EXECUTION OF EX PROGRAM

## 2025-05-08 ASSESSMENT — ENCOUNTER SYMPTOMS
MUSCLE WEAKNESS: 1
DENIES PAIN: 1

## 2025-05-08 ASSESSMENT — ACTIVITIES OF DAILY LIVING (ADL): AMBULATION ASSISTANCE ON FLAT SURFACES: 1

## 2025-05-08 NOTE — CASE COMMUNICATION
DISCHARGE SUMMARY:At dc pt is Modified I on rolling walker on room air.    DISCIPLINE: PT  DATE OF DISCIPLINE DISCHARGE: 5825  REASON FOR DISCHARGE: Goals met   EVALUATION OF GOALS: Yes  SUMMARY OF CARE PROVIDED: hep, gait training, endurance ex  DISCHARGE INSTRUCTIONS GIVEN: Cont home exercises, use ad for fall prevention  SERVICES REMAINING: none  NOMNC OBTAINED: na

## 2025-05-09 ENCOUNTER — PATIENT OUTREACH (OUTPATIENT)
Dept: PRIMARY CARE | Facility: CLINIC | Age: OVER 89
End: 2025-05-09
Payer: MEDICARE

## 2025-05-09 ASSESSMENT — ENCOUNTER SYMPTOMS
DENIES PAIN: 1
PERSON REPORTING PAIN: PATIENT

## 2025-05-09 NOTE — PROGRESS NOTES
Unable to reach patient for TCM 14 day post discharge outreach.  If no voicemail available, call attempts x 2 were made to contact the patient to assist with any questions or concerns patient may have. TCM to reassess patient needs 30 days post discharge.     Office Visit with Trice June DO (04/30/2025)

## 2025-05-11 NOTE — ASSESSMENT & PLAN NOTE
Rate controlled on decreased dose of Cardizem  (used to take 360)  Chronic anticoagulation with warfarin

## 2025-05-11 NOTE — ASSESSMENT & PLAN NOTE
Discharge medication list recommended holding Doxazosin 6 mg (has 4 mg tabs) and Altace 5 mg until sees me, continue to hold  Recommend home blood pressure monitoring  To let me know if home SBP above 130

## 2025-05-12 ENCOUNTER — HOME CARE VISIT (OUTPATIENT)
Dept: HOME HEALTH SERVICES | Facility: HOME HEALTH | Age: OVER 89
End: 2025-05-12
Payer: MEDICARE

## 2025-05-12 VITALS
OXYGEN SATURATION: 98 % | TEMPERATURE: 97.3 F | SYSTOLIC BLOOD PRESSURE: 123 MMHG | HEART RATE: 67 BPM | DIASTOLIC BLOOD PRESSURE: 65 MMHG | RESPIRATION RATE: 16 BRPM

## 2025-05-12 DIAGNOSIS — I48.91 ATRIAL FIBRILLATION, UNSPECIFIED TYPE (MULTI): ICD-10-CM

## 2025-05-12 PROCEDURE — G0158 HHC OT ASSISTANT EA 15: HCPCS | Mod: CO,HHH

## 2025-05-13 ENCOUNTER — OFFICE VISIT (OUTPATIENT)
Dept: PULMONOLOGY | Facility: CLINIC | Age: OVER 89
End: 2025-05-13
Payer: MEDICARE

## 2025-05-13 ENCOUNTER — HOSPITAL ENCOUNTER (OUTPATIENT)
Dept: RADIOLOGY | Facility: CLINIC | Age: OVER 89
Discharge: HOME | End: 2025-05-13
Payer: MEDICARE

## 2025-05-13 VITALS
OXYGEN SATURATION: 92 % | HEART RATE: 83 BPM | WEIGHT: 166 LBS | TEMPERATURE: 97.2 F | BODY MASS INDEX: 29.41 KG/M2 | SYSTOLIC BLOOD PRESSURE: 137 MMHG | DIASTOLIC BLOOD PRESSURE: 66 MMHG

## 2025-05-13 DIAGNOSIS — R06.09 DYSPNEA ON EXERTION: ICD-10-CM

## 2025-05-13 DIAGNOSIS — J90 PLEURAL EFFUSION: ICD-10-CM

## 2025-05-13 DIAGNOSIS — J90 PLEURAL EFFUSION: Primary | ICD-10-CM

## 2025-05-13 PROCEDURE — 3075F SYST BP GE 130 - 139MM HG: CPT | Performed by: INTERNAL MEDICINE

## 2025-05-13 PROCEDURE — 71046 X-RAY EXAM CHEST 2 VIEWS: CPT

## 2025-05-13 PROCEDURE — 99214 OFFICE O/P EST MOD 30 MIN: CPT | Mod: 25 | Performed by: INTERNAL MEDICINE

## 2025-05-13 PROCEDURE — 1160F RVW MEDS BY RX/DR IN RCRD: CPT | Performed by: INTERNAL MEDICINE

## 2025-05-13 PROCEDURE — 1159F MED LIST DOCD IN RCRD: CPT | Performed by: INTERNAL MEDICINE

## 2025-05-13 PROCEDURE — 1111F DSCHRG MED/CURRENT MED MERGE: CPT | Performed by: INTERNAL MEDICINE

## 2025-05-13 PROCEDURE — 3078F DIAST BP <80 MM HG: CPT | Performed by: INTERNAL MEDICINE

## 2025-05-13 PROCEDURE — 99214 OFFICE O/P EST MOD 30 MIN: CPT | Performed by: INTERNAL MEDICINE

## 2025-05-13 PROCEDURE — 1036F TOBACCO NON-USER: CPT | Performed by: INTERNAL MEDICINE

## 2025-05-13 PROCEDURE — 71046 X-RAY EXAM CHEST 2 VIEWS: CPT | Performed by: RADIOLOGY

## 2025-05-13 ASSESSMENT — ENCOUNTER SYMPTOMS
PERSON REPORTING PAIN: PATIENT
AGITATION: 0
APNEA: 0
DIZZINESS: 0
STRIDOR: 0
SINUS PAIN: 0
SINUS PRESSURE: 0
FREQUENCY: 0
PALPITATIONS: 0
WHEEZING: 0
TREMORS: 0
ADENOPATHY: 0
DENIES PAIN: 1
NERVOUS/ANXIOUS: 0
BRUISES/BLEEDS EASILY: 0
EYE DISCHARGE: 0
JOINT SWELLING: 0
SHORTNESS OF BREATH: 1
COUGH: 0
UNEXPECTED WEIGHT CHANGE: 0
DYSURIA: 0
ARTHRALGIAS: 0
DIFFICULTY URINATING: 0
CHOKING: 0
CONSTIPATION: 0
FACIAL SWELLING: 0
ABDOMINAL DISTENTION: 0
HEADACHES: 0
SPEECH DIFFICULTY: 0
HEMATURIA: 0
LIGHT-HEADEDNESS: 0
EYE REDNESS: 0
FATIGUE: 0
RHINORRHEA: 0
WEAKNESS: 0
FEVER: 0
ABDOMINAL PAIN: 0
NAUSEA: 0
NUMBNESS: 0
SLEEP DISTURBANCE: 0

## 2025-05-13 NOTE — PROGRESS NOTES
@PULMONARY FOLLOW-UP@       PROBLEM:   effusion     ASSESSMENT:  The patient is a 89-year-old with a history of atrial fibrillation elevations right hemidiaphragm with chronic heart failure with preserved ejection fraction.  He recent was admitted with worsening diastolic heart failure and required thoracentesis and 1300 mL of transudative fluid were removed.  He clearly is doing better at the present time.  His chest x-ray reveals the right pleural effusion and elevation of right diaphragm which are stable.  Essentially at baseline.    PLAN  Will continue to follow; if the fluid worsens we can always repeat the thoracentesis as an outpatient  He has portable oxygen at home which he can use PRN.   Return 3 months.      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 There is no history of trauma, degenerative arthritis of the neck or other specific causes for this condition. In addition, he was determined to have at increasing right pleural effusion accumulation on the right with 1+ edema on examination. He has had edema for extended timeframe and he does have atrial fibrillation with underlying CAD. I wondered whether he could have a component of heart failure accounting for these findings. The effusion was not marked and probably the most reasonable thing was to empirically treat him with an increased diuretic dose and then see if his effusion resolves. The echocardiogram from September 16, 2021 revealed left ventricular ejection fraction of 55 to 60% with moderately increased left ventricular septal thickness and a severely dilated left atrium. The right ventricle was mildly reduced in function but normal in size. He was seen in follow-up on October 6, 2021 and was doing well from a pulmonary perspective on his  diuretics. There was no increasing pleural effusion with elevation of the right hemidiaphragm and he was stable from a pulmonary perspective. It was felt that potentially his cough was potentiated by Ramipril or reflux. He appears compensated from a pulm perspective and the treatment was to continue as is. When last seen on June 6, 2022 he appeared stable from a pulmonary perspective. We will do chest x-ray showing chronic elevation right hemidiaphragm with blunting of the right costophrenic angle.     The patient was seen on June 19, 2023 and at that time it was described that the patient over the last several months he has noted some increasing shortness of breath. He saw Dr. Ruggiero last week his diuretic dose was increased to 2 a day. He feels that that made a difference. He has no chest pains or pressures. He has some chronic swelling but it has not changed too much. He is not wheezing or bringing up any phlegm.  The chest x-ray was stable at that point in time.     When seen on October 26, 2023 it was  outlined that the patient reports that over the last 2 to 3 months he has had increasing shortness of breath particular when he bends over.  He has no chest pains or pressures PND orthopnea.  He has chronic swelling of his legs and wears support hose and is on 40 mg of furosemide a day.  He notes that after he starts doing something he feels little bit better and he takes slow deep breaths and feels less short of breath.        His chest x-ray outline what looks to be increasing pleural fluid accumulation.  He saw cardiology and had been placed on metolazone 2.5 mg once a week.  Jardiance was to be considered.      the CT scan from January 9, 2024 outlined a moderate size right effusion with compressive atelectasis.  There is a small to moderate pleural effusion.  There was upper abdominal free fluid and stranding in the mesentery.  He did have elevation of the right hemidiaphragm.     On 2-24-24 it was noted that  the patient has been taking 40 mg of Lasix a day over the last month with a booster of metolazone once a week with extra potassium.  He feels that his breathing is about 95% improved.  His wife states that he is also breathing better at night.  He has more exercise capacity although he still gets short of breath.  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  He was to continue on the same medication.     On May 13, 2024 was reported that his breathing has been stable.  He has been on his metolazone once a week coupled with furosemide daily.  He has no chest pains or pressures.  He does me produce mucus at times.    Generally he feels pretty decent and is limited with respect to activities because of his back.  At that point in time his chest x-ray was stable.  He was continued on his diuretic.     On November 13, 2024 it was noted, the patient reports that his breathing is about the same as his his edema.  He he notes no increasing dyspnea on exertion.  He has no chest pains or pressures.  He sleeps on 1 pillow at night.  On November 13, 2024 I summarized the patient  89-year-old with atrial fibrillation and elevation of the right hemidiaphragm which potentially relates to paralysis.  He also has a likely stable pleural effusion.  His edema on examination is 1+ and he seems fairly well-controlled on the furosemide and Jardiance.  His chest x-ray reveals stability of the pleural effusion and elevation of the right hemidiaphragm.  He seems to be doing quite well clinically.    It was noted he was admitted from April 17 April 23, 2025 with hypoxic respiratory failure having acute diastolic heart failure with bilateral effusions right greater than left.  A right thoracentesis was performed and 1300 mL of transudative fluid removed.  His atrial fibrillation was associated with variable ventricular rate and he had type 2  diabetes.    A CT scan from April 21, 2025 revealed the following  1.  Moderate to large right pleural effusion. 2. Cardiomegaly. 3. Pericardial effusion which has increased in size since the prior exam.   4. Patchy density in the right lung base adjacent to the pleural effusion, which may be related to atelectasis or infiltrate     The echocardiogram from April 18, 2025 revealed the following   1. Left ventricular ejection fraction is normal, calculated by Rodriguez's biplane at 60%.   2. The left atrial size is severely dilated.   3. The right atrium is severely dilated.   4. Small to moderate pericardial effusion.   5. Absent A-wave on MV spectral Doppler tracing, consistent with atrial fibrillation.   6. Mildly elevated right ventricular systolic pressure.   7. Compared with study dated 6/28/2023, pericardial effusion has increased.    Currently, the patient reports that he is doing much better since his recent hospitalization.  His breathing is better and probably back to baseline.  He continues on his furosemide along with metolazone.  His Cardizem dose was reduced as well as his ACE inhibitor.  His swelling is down and he has no chest pains or pressures fevers or chills.      RX Allergies[1]       Current Medications[2]          Review of Systems   Constitutional:  Negative for fatigue, fever and unexpected weight change.   HENT:  Negative for congestion, facial swelling, nosebleeds, postnasal drip, rhinorrhea, sinus pressure and sinus pain.    Eyes:  Negative for discharge, redness and visual disturbance.   Respiratory:  Positive for shortness of breath. Negative for apnea, cough, choking, wheezing and stridor.    Cardiovascular:  Positive for leg swelling. Negative for chest pain and palpitations.   Gastrointestinal:  Negative for abdominal distention, abdominal pain, constipation and nausea.   Endocrine: Negative for cold intolerance and heat intolerance.   Genitourinary:  Negative for difficulty urinating,  dysuria, frequency and hematuria.   Musculoskeletal:  Negative for arthralgias, gait problem and joint swelling.   Allergic/Immunologic: Negative for environmental allergies, food allergies and immunocompromised state.   Neurological:  Negative for dizziness, tremors, syncope, speech difficulty, weakness, light-headedness, numbness and headaches.   Hematological:  Negative for adenopathy. Does not bruise/bleed easily.   Psychiatric/Behavioral:  Negative for agitation, behavioral problems and sleep disturbance. The patient is not nervous/anxious.         Vitals:    05/13/25 0955   BP: 137/66   Pulse: 83   Temp: 36.2 °C (97.2 °F)   SpO2: 92%        Physical Exam  Vitals reviewed.   Constitutional:       Appearance: Normal appearance.   HENT:      Head: Normocephalic and atraumatic.   Eyes:      Extraocular Movements: Extraocular movements intact.   Cardiovascular:      Rate and Rhythm: Normal rate and regular rhythm.      Heart sounds: No murmur heard.     No friction rub. No gallop.   Pulmonary:      Effort: Pulmonary effort is normal. No respiratory distress.      Breath sounds: No stridor. No wheezing, rhonchi or rales.      Comments: Dullness at the right base  Chest:      Chest wall: No tenderness.   Abdominal:      General: Abdomen is flat. There is no distension.      Palpations: Abdomen is soft. There is no mass.      Tenderness: There is no abdominal tenderness.   Musculoskeletal:         General: Normal range of motion.      Cervical back: Normal range of motion.      Right lower leg: No edema.      Left lower leg: No edema.   Skin:     General: Skin is warm and dry.   Neurological:      Mental Status: He is alert and oriented to person, place, and time.   Psychiatric:         Mood and Affect: Mood normal.         Behavior: Behavior normal.               [1] No Known Allergies  [2]   Current Outpatient Medications:     acetaminophen (Tylenol) 500 mg tablet, Take 1 tablet (500 mg) by mouth every 8 hours if  needed for moderate pain (4 - 6). Repeat every 4 to 6 hours as needed, Disp: , Rfl:     aspirin 81 mg EC tablet, Take 1 tablet (81 mg) by mouth once daily., Disp: , Rfl:     atorvastatin (Lipitor) 80 mg tablet, TAKE 1 TABLET(80 MG) BY MOUTH EVERY DAY, Disp: 90 tablet, Rfl: 3    cholecalciferol (Vitamin D-3) 50 mcg (2,000 unit) capsule, Take 1 capsule (50 mcg) by mouth once daily., Disp: , Rfl:     dilTIAZem CD (Cardizem CD) 240 mg 24 hr capsule, Take 1 capsule (240 mg) by mouth once daily., Disp: 30 capsule, Rfl: 0    doxazosin (Cardura) 4 mg tablet, Take 1.5 tablets (6 mg) by mouth once daily. Hold until seen by PCP in follow up, Disp: , Rfl:     furosemide (Lasix) 20 mg tablet, Take 2 tablets (40 mg) by mouth once daily., Disp: 180 tablet, Rfl: 1    glucosamine/chondr nelson A sod (OSTEO BI-FLEX ORAL), Take 2 tablets by mouth 1 (one) time each day. Joint Sheild, Disp: , Rfl:     loratadine (Claritin Liqui-Gel) 10 mg capsule, Take 1 capsule by mouth once daily as needed., Disp: , Rfl:     metFORMIN  mg 24 hr tablet, TAKE 1 TABLET(500 MG) BY MOUTH DAILY, Disp: 90 tablet, Rfl: 1    metOLazone (Zaroxolyn) 2.5 mg tablet, TAKE 1 TABLET(2.5 MG) BY MOUTH 1 TIME EVERY WEEK, Disp: 12 tablet, Rfl: 0    metoprolol tartrate (Lopressor) 25 mg tablet, Take 1 tablet (25 mg) by mouth 2 times a day., Disp: 60 tablet, Rfl: 0    multivitamin with minerals iron-free (Centrum Silver), Take 1 tablet by mouth once daily., Disp: , Rfl:     nitroglycerin (Nitrostat) 0.4 mg SL tablet, Place 1 tablet (0.4 mg) under the tongue every 5 minutes if needed for chest pain. (Patient taking differently: Place 1 tablet (0.4 mg) under the tongue every 5 minutes if needed for chest pain. to take up to 3 doses and if no relief call 911), Disp: 30 tablet, Rfl: 0    oxygen DME/Hospice (O2) therapy, Inhale 2 L/min if needed (as needed while ambulating). Indications: as needed, Disp: , Rfl:     pantoprazole (ProtoNix) 40 mg EC tablet, TAKE 1 TABLET BY  MOUTH EVERY MORNING 30 MINUTES BEFORE BREAKFAST, Disp: 90 tablet, Rfl: 1    potassium chloride CR 20 mEq ER tablet, TAKE 1 TABLET(20 MEQ) BY MOUTH DAILY, Disp: 90 tablet, Rfl: 1    ramipril (Altace) 5 mg capsule, Take 1 capsule (5 mg) by mouth once daily. Hold until seen by PCP in follow up, Disp: , Rfl:     warfarin (Coumadin) 5 mg tablet, Take 1 tablet by mouth daily Monday and Tuesday and 1/2 tab all other days or as directed, Disp: 30 tablet, Rfl: 1

## 2025-05-13 NOTE — PATIENT INSTRUCTIONS
Will continue to follow; if the fluid worsens we can always repeat the thoracentesis as an outpatient

## 2025-05-14 ENCOUNTER — HOME CARE VISIT (OUTPATIENT)
Dept: HOME HEALTH SERVICES | Facility: HOME HEALTH | Age: OVER 89
End: 2025-05-14
Payer: MEDICARE

## 2025-05-14 VITALS — HEART RATE: 57 BPM | OXYGEN SATURATION: 96 %

## 2025-05-14 DIAGNOSIS — I48.91 ATRIAL FIBRILLATION, UNSPECIFIED TYPE (MULTI): ICD-10-CM

## 2025-05-14 DIAGNOSIS — Z79.01 CHRONIC ANTICOAGULATION: ICD-10-CM

## 2025-05-14 LAB
ACID FAST STN SPEC: NORMAL
MYCOBACTERIUM SPEC CULT: NORMAL

## 2025-05-14 PROCEDURE — G0299 HHS/HOSPICE OF RN EA 15 MIN: HCPCS | Mod: HHH

## 2025-05-14 PROCEDURE — G0152 HHCP-SERV OF OT,EA 15 MIN: HCPCS | Mod: HHH

## 2025-05-14 RX ORDER — DILTIAZEM HYDROCHLORIDE 240 MG/1
240 CAPSULE, COATED, EXTENDED RELEASE ORAL DAILY
Qty: 90 CAPSULE | Refills: 1 | Status: SHIPPED | OUTPATIENT
Start: 2025-05-14

## 2025-05-14 RX ORDER — DILTIAZEM HYDROCHLORIDE 240 MG/1
240 CAPSULE, COATED, EXTENDED RELEASE ORAL DAILY
Qty: 90 CAPSULE | Refills: 3 | OUTPATIENT
Start: 2025-05-14

## 2025-05-14 ASSESSMENT — ENCOUNTER SYMPTOMS
PERSON REPORTING PAIN: PATIENT
DENIES PAIN: 1

## 2025-05-16 VITALS
RESPIRATION RATE: 20 BRPM | DIASTOLIC BLOOD PRESSURE: 60 MMHG | HEART RATE: 56 BPM | OXYGEN SATURATION: 92 % | TEMPERATURE: 98.7 F | SYSTOLIC BLOOD PRESSURE: 103 MMHG

## 2025-05-16 SDOH — HEALTH STABILITY: MENTAL HEALTH: SMOKING IN HOME: 0

## 2025-05-16 SDOH — ECONOMIC STABILITY: FOOD INSECURITY: MEALS PER DAY: 3

## 2025-05-16 SDOH — ECONOMIC STABILITY: HOUSING INSECURITY: EVIDENCE OF SMOKING MATERIAL: 0

## 2025-05-16 SDOH — ECONOMIC STABILITY: GENERAL

## 2025-05-16 ASSESSMENT — ENCOUNTER SYMPTOMS
LOWEST PAIN SEVERITY IN PAST 24 HOURS: 0/10
PAIN LOCATION: GENERALIZED
APPETITE LEVEL: FAIR
SUBJECTIVE PAIN PROGRESSION: UNCHANGED
CHANGE IN APPETITE: UNCHANGED
HIGHEST PAIN SEVERITY IN PAST 24 HOURS: 0/10
PAIN: 1
MUSCLE WEAKNESS: 1
PAIN LOCATION - PAIN SEVERITY: 0/10
PAIN SEVERITY GOAL: 0/10

## 2025-05-16 ASSESSMENT — ACTIVITIES OF DAILY LIVING (ADL)
CURRENT_FUNCTION: STAND BY ASSIST
AMBULATION ASSISTANCE: STAND BY ASSIST
MONEY MANAGEMENT (EXPENSES/BILLS): NEEDS ASSISTANCE

## 2025-05-21 ENCOUNTER — HOME CARE VISIT (OUTPATIENT)
Dept: HOME HEALTH SERVICES | Facility: HOME HEALTH | Age: OVER 89
End: 2025-05-21
Payer: MEDICARE

## 2025-05-21 LAB
ACID FAST STN SPEC: NORMAL
MYCOBACTERIUM SPEC CULT: NORMAL

## 2025-05-21 PROCEDURE — G0299 HHS/HOSPICE OF RN EA 15 MIN: HCPCS | Mod: HHH

## 2025-05-22 VITALS
SYSTOLIC BLOOD PRESSURE: 116 MMHG | HEART RATE: 50 BPM | RESPIRATION RATE: 20 BRPM | TEMPERATURE: 98.5 F | OXYGEN SATURATION: 98 % | DIASTOLIC BLOOD PRESSURE: 60 MMHG

## 2025-05-22 SDOH — ECONOMIC STABILITY: GENERAL

## 2025-05-22 SDOH — HEALTH STABILITY: MENTAL HEALTH: SMOKING IN HOME: 0

## 2025-05-22 SDOH — ECONOMIC STABILITY: HOUSING INSECURITY: EVIDENCE OF SMOKING MATERIAL: 0

## 2025-05-22 SDOH — ECONOMIC STABILITY: FOOD INSECURITY: MEALS PER DAY: 3

## 2025-05-22 ASSESSMENT — ENCOUNTER SYMPTOMS
APPETITE LEVEL: FAIR
CHANGE IN APPETITE: UNCHANGED
PERSON REPORTING PAIN: PATIENT
MUSCLE WEAKNESS: 1
PAIN LOCATION - PAIN FREQUENCY: INTERMITTENT
LOWEST PAIN SEVERITY IN PAST 24 HOURS: 0/10
PAIN LOCATION: GENERALIZED
PAIN LOCATION - PAIN QUALITY: ACHY
PAIN LOCATION - PAIN SEVERITY: 0/10
PAIN: 1
PAIN SEVERITY GOAL: 0/10
SUBJECTIVE PAIN PROGRESSION: UNCHANGED
HIGHEST PAIN SEVERITY IN PAST 24 HOURS: 0/10

## 2025-05-23 DIAGNOSIS — Z79.01 CHRONIC ANTICOAGULATION: ICD-10-CM

## 2025-05-23 DIAGNOSIS — I48.91 ATRIAL FIBRILLATION, UNSPECIFIED TYPE (MULTI): ICD-10-CM

## 2025-05-28 ENCOUNTER — HOME CARE VISIT (OUTPATIENT)
Dept: HOME HEALTH SERVICES | Facility: HOME HEALTH | Age: OVER 89
End: 2025-05-28
Payer: MEDICARE

## 2025-05-28 LAB
ACID FAST STN SPEC: NORMAL
MYCOBACTERIUM SPEC CULT: NORMAL

## 2025-05-28 PROCEDURE — G0299 HHS/HOSPICE OF RN EA 15 MIN: HCPCS | Mod: HHH

## 2025-05-30 VITALS
SYSTOLIC BLOOD PRESSURE: 109 MMHG | DIASTOLIC BLOOD PRESSURE: 60 MMHG | TEMPERATURE: 98.6 F | OXYGEN SATURATION: 97 % | RESPIRATION RATE: 20 BRPM | HEART RATE: 78 BPM

## 2025-05-30 SDOH — ECONOMIC STABILITY: HOUSING INSECURITY: EVIDENCE OF SMOKING MATERIAL: 0

## 2025-05-30 SDOH — HEALTH STABILITY: MENTAL HEALTH: SMOKING IN HOME: 0

## 2025-05-30 ASSESSMENT — ENCOUNTER SYMPTOMS
PAIN: 1
HIGHEST PAIN SEVERITY IN PAST 24 HOURS: 0/10
PAIN LOCATION: GENERALIZED
PERSON REPORTING PAIN: PATIENT
PAIN LOCATION - PAIN FREQUENCY: INTERMITTENT
PAIN SEVERITY GOAL: 0/10
SUBJECTIVE PAIN PROGRESSION: UNCHANGED
PAIN LOCATION - PAIN QUALITY: ACHY
PAIN LOCATION - PAIN SEVERITY: 0/10
LOWEST PAIN SEVERITY IN PAST 24 HOURS: 0/10

## 2025-05-30 ASSESSMENT — ACTIVITIES OF DAILY LIVING (ADL)
OASIS_M1830: 02
HOME_HEALTH_OASIS: 01

## 2025-06-02 ENCOUNTER — TELEPHONE (OUTPATIENT)
Dept: PRIMARY CARE | Facility: CLINIC | Age: OVER 89
End: 2025-06-02

## 2025-06-04 ENCOUNTER — PATIENT OUTREACH (OUTPATIENT)
Dept: PRIMARY CARE | Facility: CLINIC | Age: OVER 89
End: 2025-06-04
Payer: MEDICARE

## 2025-06-04 LAB
ACID FAST STN SPEC: NORMAL
MYCOBACTERIUM SPEC CULT: NORMAL

## 2025-06-04 NOTE — PROGRESS NOTES
Unable to reach patient for 30 day post discharge follow up call.   If no voicemail available call attempts x 2 were made to contact the patient to assist with any questions or concerns patient may have. Patient has met target of no readmission for 30 days post hospital discharge and is graduated from Transitional Care Management program at this time.    Office Visit with Trice June DO (04/30/2025)

## 2025-06-11 LAB
ACID FAST STN SPEC: NORMAL
MYCOBACTERIUM SPEC CULT: NORMAL

## 2025-06-12 LAB
INR PPP: 1.6
PROTHROMBIN TIME: 16.7 SEC (ref 9–11.5)

## 2025-06-13 DIAGNOSIS — I48.91 ATRIAL FIBRILLATION, UNSPECIFIED TYPE (MULTI): ICD-10-CM

## 2025-06-13 DIAGNOSIS — Z79.01 CHRONIC ANTICOAGULATION: ICD-10-CM

## 2025-06-15 NOTE — PROGRESS NOTES
Primary Care Physician: Trice June DO  Date of Visit: 2025 10:00 AM EDT  [facility] visit: [facility] [place] [facility] / Summary:   Ariel Rodríguez is a 89 y.o. male  with coronary disease, hypertension, hyperlipidemia, diabetes, atrial fibrillation and congestive heart failure  In 2012 he had cardiac catheterization that showed diffuse severe coronary artery disease and medical therapy was recommended  On 14 he had chest pain and shortness of breath and was admitted to the hospital with an acute myocardial infarction. He had cardiac catheterization that showed diffuse severe disease and a new 95% lesion in the distal left anterior descending artery. He had stents put in the proximal and distal left anterior descending artery  One brother  at 94, one sister is 97 years old and one brother  at age of 89 and one sister  at 83,   He had an echocardiogram on September 15, 2021 that showed normal left ventricular systolic function with moderately increased septal thickness and severely dilated left atrium  Lipid profile on 2024 showed cholesterol 97 with HDL 41.4 LDL 33 triglycerides 115.   He can only walk 1 block and he will get short of breath.   Echocardiogram on 2023 showed normal left ventricular function and severe left atrial enlargement   He continues to do well with no chest pain and can only walk 1 block limited by his back  He is known to have problem with his right hemidiaphragm followed by pulmonary service  On examination his lungs are clear with decreased breath sounds at the right lower base, the liver is not enlarged and he has +2 leg edema  Left test on 2024 was all normal except for a sodium of 155 glucose 212 hematocrit 55.8% hemoglobin A1c 8.7 INR 3.3.  Cholesterol was 84 HDL 38.6 triglycerides 98 is  On metolazone 2.5 mg once a week for half an hour by his Lasix dose and on that day take an additional dose of potassium he  had excellent diuresis with excellent results and was no longer short of breath and his leg edema improved   Last week of April 2025 he had increased shortness of breath and gained about 15 pounds and was hospitalized for further diuresis which was successful.  At that time there was a change in the dose of the diltiazem and the ramipril was discontinued.  He has oxygen at home.  He currently does not know need.  He has normal blood oximeter reading on room air  Echocardiogram on April 17, 2025 showed normal ejection fraction of 60% with markedly enlarged atria, atrial fibrillation, pericardial effusion and mild pulmonary hypertension  Blood test on April 23, 2025 was normal except for sodium 135 chloride 90 BUN 40 glucose 133  At present he is doing well with no chest pain and no shortness of breath and is active but again is limited by his back and should do stretching exercises.   We had a long discussion about diet and losing weight. Losing weight will help to control his diabetes. I did recommend 1500 poornima per day and no eating between meals.  I suggested starting Jardiance as an additional therapy but the patient at the present is opposed to taking any new medicines since he is doing so well  He is very happy with diuresis especially with metolazone once a week and keeps him stable and not short of breath and unchanged weight  He is to report to me if he has a change in his breathing or if he gains more than 4 pounds.  He has to be careful to avoid falling  He would continue his current medical therapy and followup in 4 months.   Follow-up with pulmonary and primary doctor          Past Medical History:  Past Medical History:   Diagnosis Date    Acute myocardial infarction, unspecified 11/09/2022    Acute myocardial infarction    Acute upper respiratory infection, unspecified 04/10/2018    Viral URI    Atherosclerotic heart disease of native coronary artery with unspecified angina pectoris 11/21/2022    Athscl  heart disease of native cor art w unsp ang pctrs    Benign neoplasm of colon, unspecified 08/26/2016    Colonic adenoma    Encounter for screening for malignant neoplasm of colon 07/24/2014    Colon cancer screening    Hyperglycemia 01/26/2023    Hyponatremia 01/26/2023    Obesity, unspecified 10/28/2020    Class 1 obesity with body mass index (BMI) of 32.0 to 32.9 in adult    Obesity, unspecified 11/09/2022    Obesity, Class I, BMI 30.0-34.9 (see actual BMI)    Olecranon bursitis, unspecified elbow 02/19/2020    Olecranon bursitis    Osteoarthritis of knee, unspecified 10/03/2017    Osteoarthrosis of knee    Other fecal abnormalities 08/26/2016    Guaiac positive stools    Personal history of other diseases of the circulatory system 08/17/2016    History of angina pectoris    Personal history of other diseases of the musculoskeletal system and connective tissue     History of arthritis    Personal history of other diseases of the nervous system and sense organs     History of cataract    Personal history of other diseases of the respiratory system 04/10/2018    History of acute bronchitis    Personal history of other drug therapy 10/01/2019    History of influenza vaccination    Personal history of other endocrine, nutritional and metabolic disease 12/26/2018    History of vitamin D deficiency    Personal history of other endocrine, nutritional and metabolic disease 02/26/2015    History of hypokalemia    Personal history of other specified conditions     History of heartburn    Personal history of thrombophlebitis 12/29/2015    History of deep vein thrombophlebitis of lower extremity    Pure hypercholesterolemia, unspecified 01/02/2019    Pure hypercholesterolemia, unspecified    Supratherapeutic INR 06/11/2024    Unspecified atrial fibrillation (Multi) 01/03/2023    Atrial fibrillation    Unspecified injury of head, sequela 11/29/2019    CHI (closed head injury), sequela        Past Surgical History:  Past  Surgical History:   Procedure Laterality Date    APPENDECTOMY  01/20/2014    Appendectomy    CATARACT EXTRACTION  01/20/2014    Cataract Surgery    TONSILLECTOMY  01/20/2014    Tonsillectomy          Social History:   reports that he has never smoked. He has never used smokeless tobacco. He reports current alcohol use. He reports that he does not use drugs.     Family History:  family history includes Brain Tumor in his sister; Breast cancer in his niece; Coronary artery disease in his mother; Heart failure in his brother, mother, and sister; Lung cancer in his brother and another family member; Malignant Neoplasm in his brother; Mild Cognitive Impairment in his brother; Myocardial Infarction in his sister; Prostate cancer in his brother; Stomach cancer in his father.      Allergies:  No Known Allergies    Outpatient Medications:  Current Outpatient Medications   Medication Instructions    acetaminophen (Tylenol) 500 mg tablet 1 tablet, Every 8 hours PRN    aspirin 81 mg EC tablet 1 tablet, Daily    atorvastatin (LIPITOR) 80 mg, oral, Daily    cholecalciferol (Vitamin D-3) 50 mcg (2,000 unit) capsule 1 capsule, Daily    dilTIAZem CD (CARDIZEM CD) 240 mg, oral, Daily    doxazosin (Cardura) 4 mg tablet 1.5 tablets, Daily    furosemide (LASIX) 40 mg, oral, Daily    glucosamine/chondr nelson A sod (OSTEO BI-FLEX ORAL) 2 tablets, Daily    loratadine (Claritin Liqui-Gel) 10 mg capsule 1 capsule, Daily PRN    metFORMIN XR (GLUCOPHAGE-XR) 500 mg, oral, Daily    metOLazone (Zaroxolyn) 2.5 mg tablet TAKE 1 TABLET(2.5 MG) BY MOUTH 1 TIME EVERY WEEK    metoprolol tartrate (LOPRESSOR) 25 mg, oral, 2 times daily    multivitamin with minerals iron-free (Centrum Silver) 1 tablet, Daily    nitroglycerin (NITROSTAT) 0.4 mg, sublingual, Every 5 min PRN    oxygen DME/Hospice (O2) 2 L/min, As needed    pantoprazole (ProtoNix) 40 mg EC tablet TAKE 1 TABLET BY MOUTH EVERY MORNING 30 MINUTES BEFORE BREAKFAST    potassium chloride CR 20 mEq ER  tablet TAKE 1 TABLET(20 MEQ) BY MOUTH DAILY    ramipril (Altace) 5 mg capsule 1 capsule, Daily    warfarin (Coumadin) 5 mg tablet Take 1 tablet by mouth daily Monday and Tuesday and 1/2 tab all other days or as directed       Physical Exam:  Constitutional: alert and in no acute distress.  Eyes: no erythema, swelling or discharge from the eye.  Neck: neck is supple, symmetric, trachea midline, no masses, and no thyromegaly.  Pulmonary: no increased work of breathing or signs of respiratory distress and lungs clear to auscultation.  Cardiovascular: carotid pulses 2+ bilaterally with no bruit, JVP was normal, no thrills, regular rhythm, normal S1 and S2, no murmurs, pedal pulses 2+ bilaterally and no pitting edema.  Abdomen: Abdomen non-tender, no masses, and no hepatomegaly.  Skin: Skin warm and dry, normal skin turgor  Neurologic: non- focal neurologic examination.  Psychiatric: judgement and insight is normal and oriented to person place and time.      There were no vitals filed for this visit.  Wt Readings from Last 5 Encounters:   05/13/25 75.3 kg (166 lb)   04/30/25 73.9 kg (163 lb)   04/17/25 77.1 kg (170 lb)   03/24/25 79.1 kg (174 lb 6.4 oz)   11/27/24 78 kg (172 lb)     There is no height or weight on file to calculate BMI.      Last Labs:  CMP:  Recent Labs     04/23/25  0530 04/22/25  0531 04/21/25  0750 04/20/25  0550 04/19/25  0548   * 135* 134* 135* 135*   K 4.2 3.8 3.4* 3.7 3.4*   CL 90* 89* 88* 90* 91*   CO2 39* 39* 40* 36* 36*   ANIONGAP 10 11 9* 13 11   BUN 42* 43* 33* 27* 24*   CREATININE 1.02 1.13 1.00 0.96 0.96   EGFR 70 62 71 75 75   GLUCOSE 133* 129* 125* 117* 114*     Recent Labs     04/17/25  1248 04/02/25  1130 11/06/24  1134 05/29/24  1209 01/16/24  1119   ALBUMIN 3.6 3.9 3.9 3.8 3.8   ALKPHOS 85 90 100 86 79   ALT 14 13 14 14 14   AST 22 18 19 22 19   BILITOT 0.7 0.7 0.8 0.7 0.8     CBC:  Recent Labs     04/23/25  0530 04/22/25  0531 04/21/25  0750 04/20/25  0550 04/19/25  0548   WBC  8.2 7.9 8.1 9.2 10.4   HGB 11.6* 11.2* 11.8* 11.0* 11.1*   HCT 36.0* 33.9* 35.5* 33.6* 33.2*    198 186 160 156   MCV 89 88 89 88 88     COAG:   Recent Labs     06/11/25  1106 04/23/25  0530 04/22/25  0531 04/21/25  0750 04/20/25  0550   INR 1.6* 3.8* 2.8* 2.4* 2.3*     HEME/ENDO:  Recent Labs     04/02/25  1130 12/18/24  1101 11/06/24  1134 05/29/24  1209 01/16/24  1119 11/09/22  1117 07/06/22  0835 03/02/22  1014 11/22/21  1200   IRONSAT  --  15*  --   --   --   --  18*  --  22*   HGBA1C 7.5*  --  7.2* 6.8* 7.0*   < > 7.2*   < > 7.1*    < > = values in this interval not displayed.      CARDIAC:   Recent Labs     04/18/25  0611 04/17/25  1343 04/17/25  1248     --   --    TROPHS  --  5 5   BNP  --   --  466*     Recent Labs     11/06/24  1134 01/16/24  1119 11/09/22  1117 07/06/22  0835 06/22/21  0815   CHOL 97 84 93 103 93   LDLF  --   --  33 38 30   HDL 41.4 38.6 39.0* 42.4 38.9*   TRIG 115 98 105 113 120       Last Cardiology Tests:  ECG:      Echo:  Echo Results:  No results found for this or any previous visit from the past 3650 days.       Cath:      Stress Test:  Stress Results:  No results found for this or any previous visit from the past 365 days.         Cardiac Imaging:  XR chest 2 views  Narrative: Interpreted By:  Radu Gant,   STUDY:  XR CHEST 2 VIEWS;  5/13/2025 10:14 am      INDICATION:  Signs/Symptoms:sob   effusions.      ,J90 Pleural effusion, not elsewhere classified,R06.09 Other forms of  dyspnea      COMPARISON:  04/21/2025      ACCESSION NUMBER(S):  [id number]      ORDERING CLINICIAN:  GILBERT SANDY      FINDINGS:                  CARDIOMEDIASTINAL SILHOUETTE:  Cardiomediastinal silhouette is normal in size and configuration.      LUNGS:  There is a moderate-sized right pleural effusion similar to the  prior. Right basilar airspace disease present. No edema seen. The  left lung is clear      ABDOMEN:  No remarkable upper abdominal findings.      BONES:  No acute  osseous changes.      Impression: 1. Redemonstration of a moderate-sized right pleural effusion with  right basilar atelectasis overall similar to the prior              MACRO:  None      Signed by: Radu Gant 5/14/2025 7:51 PM  Dictation workstation:   [id number]          Orders:  No orders of the defined types were placed in this encounter.     Followup Appts:  Future Appointments   Date Time Provider Department Center   6/18/2025 10:00 AM Jovanny Ruggiero MD [id number] [facility]   8/13/2025 10:00 AM Stevenson Tolentino MD MPH [id number] [facility]   11/12/2025  9:00 AM Trice June DO [id number] [facility]           ____________________________________________________________  Jovanny Ruggiero MD  [facility] October which what are the dates they exactly Thursday okay Dr. Beltran 910 what ever October okay and then when I was staying which showed which okay had about health 1 day that is bad I love you mom.  There okay fine I will

## 2025-06-18 ENCOUNTER — APPOINTMENT (OUTPATIENT)
Dept: CARDIOLOGY | Facility: CLINIC | Age: OVER 89
End: 2025-06-18
Payer: MEDICARE

## 2025-06-18 VITALS
DIASTOLIC BLOOD PRESSURE: 73 MMHG | OXYGEN SATURATION: 95 % | SYSTOLIC BLOOD PRESSURE: 113 MMHG | HEIGHT: 63 IN | WEIGHT: 166.5 LBS | BODY MASS INDEX: 29.5 KG/M2 | HEART RATE: 74 BPM

## 2025-06-18 DIAGNOSIS — I50.20 SYSTOLIC CONGESTIVE HEART FAILURE, UNSPECIFIED HF CHRONICITY: ICD-10-CM

## 2025-06-18 DIAGNOSIS — E78.5 HYPERLIPIDEMIA, UNSPECIFIED HYPERLIPIDEMIA TYPE: ICD-10-CM

## 2025-06-18 DIAGNOSIS — I48.91 ATRIAL FIBRILLATION, UNSPECIFIED TYPE (MULTI): ICD-10-CM

## 2025-06-18 DIAGNOSIS — I15.9 SECONDARY HYPERTENSION: ICD-10-CM

## 2025-06-18 DIAGNOSIS — I25.10 3-VESSEL CORONARY ARTERY DISEASE: Primary | ICD-10-CM

## 2025-06-18 PROCEDURE — 99214 OFFICE O/P EST MOD 30 MIN: CPT | Performed by: INTERNAL MEDICINE

## 2025-06-18 PROCEDURE — 1159F MED LIST DOCD IN RCRD: CPT | Performed by: INTERNAL MEDICINE

## 2025-06-18 PROCEDURE — 1126F AMNT PAIN NOTED NONE PRSNT: CPT | Performed by: INTERNAL MEDICINE

## 2025-06-18 PROCEDURE — G2211 COMPLEX E/M VISIT ADD ON: HCPCS | Performed by: INTERNAL MEDICINE

## 2025-06-18 PROCEDURE — 1036F TOBACCO NON-USER: CPT | Performed by: INTERNAL MEDICINE

## 2025-06-18 PROCEDURE — 3074F SYST BP LT 130 MM HG: CPT | Performed by: INTERNAL MEDICINE

## 2025-06-18 PROCEDURE — 3078F DIAST BP <80 MM HG: CPT | Performed by: INTERNAL MEDICINE

## 2025-06-18 PROCEDURE — 99212 OFFICE O/P EST SF 10 MIN: CPT

## 2025-06-18 ASSESSMENT — PAIN SCALES - GENERAL: PAINLEVEL_OUTOF10: 0-NO PAIN

## 2025-06-18 ASSESSMENT — ENCOUNTER SYMPTOMS
LOSS OF SENSATION IN FEET: 0
DEPRESSION: 0
OCCASIONAL FEELINGS OF UNSTEADINESS: 1

## 2025-06-25 LAB
INR PPP: 2.2
PROTHROMBIN TIME: 22.2 SEC (ref 9–11.5)

## 2025-07-04 DIAGNOSIS — I48.91 ATRIAL FIBRILLATION, UNSPECIFIED TYPE (MULTI): ICD-10-CM

## 2025-07-04 DIAGNOSIS — Z79.01 CHRONIC ANTICOAGULATION: ICD-10-CM

## 2025-07-11 DIAGNOSIS — I48.91 ATRIAL FIBRILLATION, UNSPECIFIED TYPE (MULTI): ICD-10-CM

## 2025-07-12 RX ORDER — METOPROLOL TARTRATE 25 MG/1
25 TABLET, FILM COATED ORAL 2 TIMES DAILY
Qty: 60 TABLET | Refills: 0 | Status: SHIPPED | OUTPATIENT
Start: 2025-07-12

## 2025-07-23 LAB
INR PPP: 1.6
PROTHROMBIN TIME: 16.1 SEC (ref 9–11.5)

## 2025-07-24 DIAGNOSIS — Z79.01 CHRONIC ANTICOAGULATION: ICD-10-CM

## 2025-07-24 DIAGNOSIS — I48.91 ATRIAL FIBRILLATION, UNSPECIFIED TYPE (MULTI): Primary | ICD-10-CM

## 2025-07-25 DIAGNOSIS — I48.91 ATRIAL FIBRILLATION, UNSPECIFIED TYPE (MULTI): ICD-10-CM

## 2025-07-25 DIAGNOSIS — Z79.01 CHRONIC ANTICOAGULATION: ICD-10-CM

## 2025-07-30 ENCOUNTER — APPOINTMENT (OUTPATIENT)
Dept: PRIMARY CARE | Facility: CLINIC | Age: OVER 89
End: 2025-07-30
Payer: MEDICARE

## 2025-07-30 NOTE — ASSESSMENT & PLAN NOTE
BP well controlled on current medication  Labs ordered CMP  
Controlled, Last HBA1C 6.9 5/31/23, Urine Albumin 5/31/23  Continue metformin  mg daily  Labs ordered HBA1C CMP  
INR 2.8 on 9/19/23  Continue current dose warfarin  
Management per cardiology  Chronic anticoagulation with warfarin  
Mild anemia H/H 11.7/37.9 on 3/21/23 Labs  Labs ordered CBC with diff  
Received annual influenza vaccine Monday  Plans to get Covid booster   Plans to get RSV vaccine separate from other vaccines by 2 weeks  
Stable on furosemide  
Opt out

## 2025-08-07 DIAGNOSIS — I48.91 ATRIAL FIBRILLATION, UNSPECIFIED TYPE (MULTI): ICD-10-CM

## 2025-08-07 DIAGNOSIS — Z79.01 CHRONIC ANTICOAGULATION: ICD-10-CM

## 2025-08-08 RX ORDER — WARFARIN SODIUM 5 MG/1
TABLET ORAL
Qty: 30 TABLET | Refills: 1 | Status: SHIPPED | OUTPATIENT
Start: 2025-08-08

## 2025-08-08 RX ORDER — METOPROLOL TARTRATE 25 MG/1
25 TABLET, FILM COATED ORAL 2 TIMES DAILY
Qty: 60 TABLET | Refills: 0 | Status: SHIPPED | OUTPATIENT
Start: 2025-08-08

## 2025-08-13 ENCOUNTER — HOSPITAL ENCOUNTER (OUTPATIENT)
Dept: RADIOLOGY | Facility: CLINIC | Age: OVER 89
Discharge: HOME | End: 2025-08-13
Payer: MEDICARE

## 2025-08-13 ENCOUNTER — OFFICE VISIT (OUTPATIENT)
Dept: PULMONOLOGY | Facility: CLINIC | Age: OVER 89
End: 2025-08-13
Payer: MEDICARE

## 2025-08-13 VITALS
SYSTOLIC BLOOD PRESSURE: 125 MMHG | RESPIRATION RATE: 18 BRPM | BODY MASS INDEX: 29.94 KG/M2 | DIASTOLIC BLOOD PRESSURE: 75 MMHG | HEART RATE: 77 BPM | OXYGEN SATURATION: 91 % | TEMPERATURE: 97.5 F | WEIGHT: 169 LBS

## 2025-08-13 DIAGNOSIS — J90 PLEURAL EFFUSION: ICD-10-CM

## 2025-08-13 DIAGNOSIS — J90 PLEURAL EFFUSION: Primary | ICD-10-CM

## 2025-08-13 PROCEDURE — 1160F RVW MEDS BY RX/DR IN RCRD: CPT | Performed by: INTERNAL MEDICINE

## 2025-08-13 PROCEDURE — 71046 X-RAY EXAM CHEST 2 VIEWS: CPT

## 2025-08-13 PROCEDURE — 71046 X-RAY EXAM CHEST 2 VIEWS: CPT | Performed by: RADIOLOGY

## 2025-08-13 PROCEDURE — 3078F DIAST BP <80 MM HG: CPT | Performed by: INTERNAL MEDICINE

## 2025-08-13 PROCEDURE — 3074F SYST BP LT 130 MM HG: CPT | Performed by: INTERNAL MEDICINE

## 2025-08-13 PROCEDURE — 99212 OFFICE O/P EST SF 10 MIN: CPT | Mod: 25 | Performed by: INTERNAL MEDICINE

## 2025-08-13 PROCEDURE — 1159F MED LIST DOCD IN RCRD: CPT | Performed by: INTERNAL MEDICINE

## 2025-08-13 PROCEDURE — 99214 OFFICE O/P EST MOD 30 MIN: CPT | Performed by: INTERNAL MEDICINE

## 2025-08-13 PROCEDURE — 1036F TOBACCO NON-USER: CPT | Performed by: INTERNAL MEDICINE

## 2025-08-13 ASSESSMENT — ENCOUNTER SYMPTOMS
ABDOMINAL DISTENTION: 0
STRIDOR: 0
WHEEZING: 0
APNEA: 0
JOINT SWELLING: 0
HEMATURIA: 0
TREMORS: 0
SPEECH DIFFICULTY: 0
SINUS PRESSURE: 0
UNEXPECTED WEIGHT CHANGE: 0
SINUS PAIN: 0
COUGH: 0
DYSURIA: 0
DIFFICULTY URINATING: 0
NUMBNESS: 0
NAUSEA: 0
DIZZINESS: 0
CHOKING: 0
FACIAL SWELLING: 0
PALPITATIONS: 0
HEADACHES: 0
WEAKNESS: 0
EYE REDNESS: 0
CONSTIPATION: 0
FREQUENCY: 0
SHORTNESS OF BREATH: 1
ARTHRALGIAS: 0
SLEEP DISTURBANCE: 0
AGITATION: 0
ABDOMINAL PAIN: 0
RHINORRHEA: 0
FEVER: 0
EYE DISCHARGE: 0
FATIGUE: 0
NERVOUS/ANXIOUS: 0
LIGHT-HEADEDNESS: 0
BRUISES/BLEEDS EASILY: 0
ADENOPATHY: 0

## 2025-08-14 ENCOUNTER — APPOINTMENT (OUTPATIENT)
Dept: PRIMARY CARE | Facility: CLINIC | Age: OVER 89
End: 2025-08-14
Payer: MEDICARE

## 2025-08-15 DIAGNOSIS — Z79.01 CHRONIC ANTICOAGULATION: ICD-10-CM

## 2025-08-15 DIAGNOSIS — I48.91 ATRIAL FIBRILLATION, UNSPECIFIED TYPE (MULTI): ICD-10-CM

## 2025-08-21 LAB
INR PPP: 2.1
PROTHROMBIN TIME: 21.5 SEC (ref 9–11.5)

## 2025-08-27 ENCOUNTER — APPOINTMENT (OUTPATIENT)
Dept: PRIMARY CARE | Facility: CLINIC | Age: OVER 89
End: 2025-08-27
Payer: MEDICARE

## 2025-08-27 VITALS
SYSTOLIC BLOOD PRESSURE: 120 MMHG | HEIGHT: 63 IN | WEIGHT: 170.8 LBS | OXYGEN SATURATION: 99 % | DIASTOLIC BLOOD PRESSURE: 64 MMHG | RESPIRATION RATE: 16 BRPM | HEART RATE: 75 BPM | BODY MASS INDEX: 30.26 KG/M2 | TEMPERATURE: 98 F

## 2025-08-27 DIAGNOSIS — J90 PLEURAL EFFUSION: ICD-10-CM

## 2025-08-27 DIAGNOSIS — E78.5 HYPERLIPIDEMIA, UNSPECIFIED HYPERLIPIDEMIA TYPE: ICD-10-CM

## 2025-08-27 DIAGNOSIS — I25.10 3-VESSEL CORONARY ARTERY DISEASE: ICD-10-CM

## 2025-08-27 DIAGNOSIS — K21.9 GASTROESOPHAGEAL REFLUX DISEASE WITHOUT ESOPHAGITIS: ICD-10-CM

## 2025-08-27 DIAGNOSIS — Z79.01 CHRONIC ANTICOAGULATION: ICD-10-CM

## 2025-08-27 DIAGNOSIS — I48.91 ATRIAL FIBRILLATION, UNSPECIFIED TYPE (MULTI): Primary | ICD-10-CM

## 2025-08-27 PROCEDURE — 99214 OFFICE O/P EST MOD 30 MIN: CPT | Performed by: STUDENT IN AN ORGANIZED HEALTH CARE EDUCATION/TRAINING PROGRAM

## 2025-08-27 PROCEDURE — 1036F TOBACCO NON-USER: CPT | Performed by: STUDENT IN AN ORGANIZED HEALTH CARE EDUCATION/TRAINING PROGRAM

## 2025-08-27 PROCEDURE — 1159F MED LIST DOCD IN RCRD: CPT | Performed by: STUDENT IN AN ORGANIZED HEALTH CARE EDUCATION/TRAINING PROGRAM

## 2025-08-27 PROCEDURE — 3078F DIAST BP <80 MM HG: CPT | Performed by: STUDENT IN AN ORGANIZED HEALTH CARE EDUCATION/TRAINING PROGRAM

## 2025-08-27 PROCEDURE — 3074F SYST BP LT 130 MM HG: CPT | Performed by: STUDENT IN AN ORGANIZED HEALTH CARE EDUCATION/TRAINING PROGRAM

## 2025-08-27 ASSESSMENT — ANXIETY QUESTIONNAIRES
3. WORRYING TOO MUCH ABOUT DIFFERENT THINGS: NOT AT ALL
GAD7 TOTAL SCORE: 0
4. TROUBLE RELAXING: NOT AT ALL
6. BECOMING EASILY ANNOYED OR IRRITABLE: NOT AT ALL
7. FEELING AFRAID AS IF SOMETHING AWFUL MIGHT HAPPEN: NOT AT ALL
5. BEING SO RESTLESS THAT IT IS HARD TO SIT STILL: NOT AT ALL
1. FEELING NERVOUS, ANXIOUS, OR ON EDGE: NOT AT ALL
2. NOT BEING ABLE TO STOP OR CONTROL WORRYING: NOT AT ALL

## 2025-08-28 DIAGNOSIS — Z79.01 CHRONIC ANTICOAGULATION: ICD-10-CM

## 2025-08-28 DIAGNOSIS — I48.91 ATRIAL FIBRILLATION, UNSPECIFIED TYPE (MULTI): ICD-10-CM

## 2025-08-29 DIAGNOSIS — Z79.01 CHRONIC ANTICOAGULATION: ICD-10-CM

## 2025-08-29 DIAGNOSIS — I48.91 ATRIAL FIBRILLATION, UNSPECIFIED TYPE (MULTI): ICD-10-CM

## 2025-11-12 ENCOUNTER — APPOINTMENT (OUTPATIENT)
Dept: PRIMARY CARE | Facility: CLINIC | Age: OVER 89
End: 2025-11-12
Payer: MEDICARE

## 2025-12-01 ENCOUNTER — APPOINTMENT (OUTPATIENT)
Dept: PRIMARY CARE | Facility: CLINIC | Age: OVER 89
End: 2025-12-01
Payer: MEDICARE